# Patient Record
Sex: MALE | Race: WHITE | NOT HISPANIC OR LATINO | Employment: FULL TIME | URBAN - METROPOLITAN AREA
[De-identification: names, ages, dates, MRNs, and addresses within clinical notes are randomized per-mention and may not be internally consistent; named-entity substitution may affect disease eponyms.]

---

## 2019-04-30 ENCOUNTER — OFFICE VISIT CONVERTED (OUTPATIENT)
Dept: FAMILY MEDICINE CLINIC | Facility: CLINIC | Age: 39
End: 2019-04-30
Attending: FAMILY MEDICINE

## 2019-04-30 ENCOUNTER — HOSPITAL ENCOUNTER (OUTPATIENT)
Dept: FAMILY MEDICINE CLINIC | Facility: CLINIC | Age: 39
Discharge: HOME | End: 2019-04-30
Attending: FAMILY MEDICINE

## 2019-04-30 LAB
ALBUMIN SERPL-MCNC: 4.3 G/DL (ref 3.5–5)
ALBUMIN/GLOB SERPL: 1.6 {RATIO} (ref 1.4–2.6)
ALP SERPL-CCNC: 53 U/L (ref 53–128)
ALT SERPL-CCNC: 17 U/L (ref 10–40)
ANION GAP SERPL CALC-SCNC: 12 MMOL/L (ref 8–19)
AST SERPL-CCNC: 19 U/L (ref 15–50)
BILIRUB SERPL-MCNC: 0.46 MG/DL (ref 0.2–1.3)
BUN SERPL-MCNC: 15 MG/DL (ref 5–25)
BUN/CREAT SERPL: 15 {RATIO} (ref 6–20)
CALCIUM SERPL-MCNC: 9.4 MG/DL (ref 8.7–10.4)
CHLORIDE SERPL-SCNC: 102 MMOL/L (ref 99–111)
CHOLEST SERPL-MCNC: 154 MG/DL (ref 107–200)
CHOLEST/HDLC SERPL: 3.9 {RATIO} (ref 3–6)
CONV CO2: 31 MMOL/L (ref 22–32)
CONV TOTAL PROTEIN: 7 G/DL (ref 6.3–8.2)
CREAT UR-MCNC: 0.99 MG/DL (ref 0.7–1.2)
GFR SERPLBLD BASED ON 1.73 SQ M-ARVRAT: >60 ML/MIN/{1.73_M2}
GLOBULIN UR ELPH-MCNC: 2.7 G/DL (ref 2–3.5)
GLUCOSE SERPL-MCNC: 72 MG/DL (ref 70–99)
HDLC SERPL-MCNC: 40 MG/DL (ref 40–60)
LDLC SERPL CALC-MCNC: 87 MG/DL (ref 70–100)
OSMOLALITY SERPL CALC.SUM OF ELEC: 291 MOSM/KG (ref 273–304)
POTASSIUM SERPL-SCNC: 4.4 MMOL/L (ref 3.5–5.3)
SODIUM SERPL-SCNC: 141 MMOL/L (ref 135–147)
TRIGL SERPL-MCNC: 133 MG/DL (ref 40–150)
VLDLC SERPL-MCNC: 27 MG/DL (ref 5–37)

## 2019-05-17 ENCOUNTER — OFFICE VISIT CONVERTED (OUTPATIENT)
Dept: FAMILY MEDICINE CLINIC | Facility: CLINIC | Age: 39
End: 2019-05-17
Attending: FAMILY MEDICINE

## 2019-07-30 ENCOUNTER — OFFICE VISIT CONVERTED (OUTPATIENT)
Dept: FAMILY MEDICINE CLINIC | Facility: CLINIC | Age: 39
End: 2019-07-30
Attending: FAMILY MEDICINE

## 2019-09-05 ENCOUNTER — HOSPITAL ENCOUNTER (OUTPATIENT)
Dept: FAMILY MEDICINE CLINIC | Facility: CLINIC | Age: 39
Discharge: HOME | End: 2019-09-05
Attending: FAMILY MEDICINE

## 2019-09-05 ENCOUNTER — OFFICE VISIT CONVERTED (OUTPATIENT)
Dept: FAMILY MEDICINE CLINIC | Facility: CLINIC | Age: 39
End: 2019-09-05
Attending: FAMILY MEDICINE

## 2019-09-05 LAB
ALBUMIN SERPL-MCNC: 4 G/DL (ref 3.5–5)
ALP SERPL-CCNC: 61 U/L (ref 53–128)
ALT SERPL-CCNC: 29 U/L (ref 10–40)
AMYLASE SERPL-CCNC: 41 U/L (ref 30–110)
AST SERPL-CCNC: 26 U/L (ref 15–50)
BASOPHILS # BLD AUTO: 0.06 10*3/UL (ref 0–0.2)
BASOPHILS NFR BLD AUTO: 0.7 % (ref 0–3)
BILIRUB SERPL-MCNC: 0.26 MG/DL (ref 0.2–1.3)
CONV ABS IMM GRAN: 0.03 10*3/UL (ref 0–0.2)
CONV BILI, CONJUGATED: <0.2 MG/DL (ref 0–0.6)
CONV IMMATURE GRAN: 0.4 % (ref 0–1.8)
CONV TOTAL PROTEIN: 6.5 G/DL (ref 6.3–8.2)
CONV UNCONJUGATED BILIRUBIN: 0.1 MG/DL (ref 0–1.1)
DEPRECATED RDW RBC AUTO: 45.9 FL (ref 35.1–43.9)
EOSINOPHIL # BLD AUTO: 1.9 10*3/UL (ref 0–0.7)
EOSINOPHIL # BLD AUTO: 22.7 % (ref 0–7)
ERYTHROCYTE [DISTWIDTH] IN BLOOD BY AUTOMATED COUNT: 13 % (ref 11.6–14.4)
HCT VFR BLD AUTO: 47.1 % (ref 42–52)
HGB BLD-MCNC: 14.8 G/DL (ref 14–18)
LIPASE SERPL-CCNC: 44 U/L (ref 5–51)
LYMPHOCYTES # BLD AUTO: 1.85 10*3/UL (ref 1–5)
LYMPHOCYTES NFR BLD AUTO: 22.1 % (ref 20–45)
MCH RBC QN AUTO: 30.1 PG (ref 27–31)
MCHC RBC AUTO-ENTMCNC: 31.4 G/DL (ref 33–37)
MCV RBC AUTO: 95.7 FL (ref 80–96)
MONOCYTES # BLD AUTO: 0.64 10*3/UL (ref 0.2–1.2)
MONOCYTES NFR BLD AUTO: 7.6 % (ref 3–10)
NEUTROPHILS # BLD AUTO: 3.9 10*3/UL (ref 2–8)
NEUTROPHILS NFR BLD AUTO: 46.5 % (ref 30–85)
NRBC CBCN: 0 % (ref 0–0.7)
PLATELET # BLD AUTO: 251 10*3/UL (ref 130–400)
PMV BLD AUTO: 11.9 FL (ref 9.4–12.4)
RBC # BLD AUTO: 4.92 10*6/UL (ref 4.7–6.1)
WBC # BLD AUTO: 8.38 10*3/UL (ref 4.8–10.8)

## 2019-09-30 ENCOUNTER — OFFICE VISIT CONVERTED (OUTPATIENT)
Dept: FAMILY MEDICINE CLINIC | Facility: CLINIC | Age: 39
End: 2019-09-30
Attending: FAMILY MEDICINE

## 2019-10-11 ENCOUNTER — OFFICE VISIT CONVERTED (OUTPATIENT)
Dept: FAMILY MEDICINE CLINIC | Facility: CLINIC | Age: 39
End: 2019-10-11
Attending: FAMILY MEDICINE

## 2019-10-14 ENCOUNTER — OFFICE VISIT CONVERTED (OUTPATIENT)
Dept: FAMILY MEDICINE CLINIC | Facility: CLINIC | Age: 39
End: 2019-10-14
Attending: FAMILY MEDICINE

## 2019-11-04 ENCOUNTER — CONVERSION ENCOUNTER (OUTPATIENT)
Dept: FAMILY MEDICINE CLINIC | Facility: CLINIC | Age: 39
End: 2019-11-04

## 2019-11-04 ENCOUNTER — OFFICE VISIT CONVERTED (OUTPATIENT)
Dept: FAMILY MEDICINE CLINIC | Facility: CLINIC | Age: 39
End: 2019-11-04
Attending: FAMILY MEDICINE

## 2019-11-23 ENCOUNTER — HOSPITAL ENCOUNTER (OUTPATIENT)
Dept: URGENT CARE | Facility: CLINIC | Age: 39
Discharge: HOME | End: 2019-11-23

## 2019-11-29 ENCOUNTER — HOSPITAL ENCOUNTER (OUTPATIENT)
Dept: URGENT CARE | Facility: CLINIC | Age: 39
Discharge: HOME | End: 2019-11-29

## 2019-12-04 ENCOUNTER — HOSPITAL ENCOUNTER (OUTPATIENT)
Dept: URGENT CARE | Facility: CLINIC | Age: 39
Discharge: HOME | End: 2019-12-04

## 2020-02-10 ENCOUNTER — OFFICE VISIT CONVERTED (OUTPATIENT)
Dept: FAMILY MEDICINE CLINIC | Facility: CLINIC | Age: 40
End: 2020-02-10
Attending: FAMILY MEDICINE

## 2020-05-12 ENCOUNTER — TELEMEDICINE CONVERTED (OUTPATIENT)
Dept: FAMILY MEDICINE CLINIC | Facility: CLINIC | Age: 40
End: 2020-05-12
Attending: FAMILY MEDICINE

## 2020-08-13 ENCOUNTER — HOSPITAL ENCOUNTER (OUTPATIENT)
Dept: FAMILY MEDICINE CLINIC | Facility: CLINIC | Age: 40
Discharge: HOME | End: 2020-08-13
Attending: FAMILY MEDICINE

## 2020-08-13 ENCOUNTER — OFFICE VISIT CONVERTED (OUTPATIENT)
Dept: FAMILY MEDICINE CLINIC | Facility: CLINIC | Age: 40
End: 2020-08-13
Attending: FAMILY MEDICINE

## 2020-08-13 LAB
ALBUMIN SERPL-MCNC: 4.1 G/DL (ref 3.5–5)
ALBUMIN/GLOB SERPL: 1.3 {RATIO} (ref 1.4–2.6)
ALP SERPL-CCNC: 62 U/L (ref 53–128)
ALT SERPL-CCNC: 15 U/L (ref 10–40)
ANION GAP SERPL CALC-SCNC: 17 MMOL/L (ref 8–19)
AST SERPL-CCNC: 18 U/L (ref 15–50)
BILIRUB SERPL-MCNC: 0.46 MG/DL (ref 0.2–1.3)
BUN SERPL-MCNC: 14 MG/DL (ref 5–25)
BUN/CREAT SERPL: 12 {RATIO} (ref 6–20)
CALCIUM SERPL-MCNC: 10.1 MG/DL (ref 8.7–10.4)
CHLORIDE SERPL-SCNC: 103 MMOL/L (ref 99–111)
CHOLEST SERPL-MCNC: 158 MG/DL (ref 107–200)
CHOLEST/HDLC SERPL: 4.1 {RATIO} (ref 3–6)
CONV CO2: 27 MMOL/L (ref 22–32)
CONV TOTAL PROTEIN: 7.2 G/DL (ref 6.3–8.2)
CREAT UR-MCNC: 1.15 MG/DL (ref 0.7–1.2)
GFR SERPLBLD BASED ON 1.73 SQ M-ARVRAT: >60 ML/MIN/{1.73_M2}
GLOBULIN UR ELPH-MCNC: 3.1 G/DL (ref 2–3.5)
GLUCOSE SERPL-MCNC: 82 MG/DL (ref 70–99)
HDLC SERPL-MCNC: 39 MG/DL (ref 40–60)
LDLC SERPL CALC-MCNC: 92 MG/DL (ref 70–100)
OSMOLALITY SERPL CALC.SUM OF ELEC: 294 MOSM/KG (ref 273–304)
POTASSIUM SERPL-SCNC: 5.1 MMOL/L (ref 3.5–5.3)
SODIUM SERPL-SCNC: 142 MMOL/L (ref 135–147)
TRIGL SERPL-MCNC: 135 MG/DL (ref 40–150)
VLDLC SERPL-MCNC: 27 MG/DL (ref 5–37)

## 2020-09-10 ENCOUNTER — OFFICE VISIT CONVERTED (OUTPATIENT)
Dept: FAMILY MEDICINE CLINIC | Facility: CLINIC | Age: 40
End: 2020-09-10
Attending: FAMILY MEDICINE

## 2020-11-13 ENCOUNTER — OFFICE VISIT CONVERTED (OUTPATIENT)
Dept: FAMILY MEDICINE CLINIC | Facility: CLINIC | Age: 40
End: 2020-11-13
Attending: FAMILY MEDICINE

## 2021-02-16 ENCOUNTER — TELEMEDICINE CONVERTED (OUTPATIENT)
Dept: FAMILY MEDICINE CLINIC | Facility: CLINIC | Age: 41
End: 2021-02-16
Attending: FAMILY MEDICINE

## 2021-03-18 ENCOUNTER — OFFICE VISIT CONVERTED (OUTPATIENT)
Dept: FAMILY MEDICINE CLINIC | Facility: CLINIC | Age: 41
End: 2021-03-18
Attending: FAMILY MEDICINE

## 2021-05-13 NOTE — PROGRESS NOTES
Progress Note      Patient Name: Shiva Varela   Patient ID: [id number]   Sex: Male   YOB: 1980    Primary Care Provider: Ed Myers DO    Visit Date: August 13, 2020    Provider: Ed Myers DO   Location: [facility]   Location Address: [address] [place] [address]  [id number]   Location Phone: [phone]          Chief Complaint  · 3 month follow up - HTN, ADD      History Of Present Illness  Shiva Varela is a 39 year old /White male who presents for evaluation and treatment of: HTN and ADD. He states that he has NOT abiola checking his BP outside the office. His focus and concentration is fair.       Past Medical History  Disease Name Date Onset Notes   ADD (attention deficit disorder) --  --    Essential hypertension 04/30/2019 --    Hernia --  --    Recurrent major depressive disorder, in full remission 04/30/2019 --          Past Surgical History  Procedure Name Date Notes   ACL Reconstruction 2000 left knee   Appendectomy --  --    Hand surgery --  --    Hernia --  --    Meniscectomy, knee, lateral 2015 --    [place] Tooth Extraction --  --          Medication List  Name Date Started Instructions   Adderall XR 20 mg oral capsule,extended release 24hr 08/11/2020 take 1 capsule (20 mg) by oral route once daily in the morning upon awakening for 30 days   fluticasone propionate 50 mcg/actuation nasal spray,suspension 03/06/2020 SPRAY 1 SPRAY INTO EACH NOSTRIL EVERY DAY   metoprolol succinate 50 mg oral tablet extended release 24 hr 12/13/2019 take 1 tablet (50 mg) by oral route once daily for 90 days   Zoloft 100 mg oral tablet 12/13/2019 take 1 tablet (100 mg) by oral route once daily for 90 days         Allergy List  Allergen Name Date Reaction Notes   Codiene --  --  --          Family Medical History  Disease Name Relative/Age Notes   Heart Disease Grandfather (paternal)/  Grandmother (paternal)/   --    Cancer, Unspecified Grandfather  "(paternal)/   --    Diabetes Uncle/   --          Social History  Finding Status Start/Stop Quantity Notes   Alcohol Never --/-- --  --    Tobacco Never --/-- --  --          Immunizations  NameDate Admin Mfg Trade Name Lot Number Route Inj VIS Given VIS Publication   [id number]/04/2019 [facility] Fluzone Quadrivalent [id number] IM RD 11/04/2019    Comments: pt tolerated well with no complaints         Review of Systems  · Constitutional  o Denies  o : fatigue, weight loss, loss of appetite  · HENT  o Denies  o : headaches  · Cardiovascular  o Denies  o : chest pain, irregular heart beats, rapid heart rate  · Respiratory  o Denies  o : shortness of breath, wheezing, cough, dyspnea on exertion  · Neurologic  o Denies  o : difficulty concentrating  · Psychiatric  o Denies  o : inattentiveness      Vitals  Date Time BP Position Site L\R Cuff Size HR RR TEMP (F) WT  HT  BMI kg/m2 BSA m2 O2 Sat HC       11/04/2019 08:03 /62 Sitting    72 - R   259lbs 0oz 6'  5\" 30.71 2.53 98 %    02/10/2020 03:43 /74 Sitting    69 - R   264lbs 4oz 6'  5\" 31.34 2.55 100 %    08/13/2020 10:04 /79 Sitting    87 - R  97.2 265lbs 0oz 6'  5\" 31.42 2.56 99 %          Physical Examination  · Constitutional  o Appearance  o : well-nourished, well developed, no obvious deformities present  · Respiratory  o Respiratory Effort  o : breathing unlabored, no accessory muscle use  o Auscultation of Lungs  o : normal breath sounds  · Cardiovascular  o Heart  o :   § Auscultation of Heart  § : regular rate, normal rhythm, no murmurs present          Results  · In-Office Procedures  o Lab procedure  § IOP - Urine Drug Screen In-House [facility] (58579)   § Amphetamines Ur Ql: Positive   § Barbiturates Ur Ql: Negative   § Buprenorphine+Nor Ur Ql Scn: Negative   § Benzodiaz Ur Ql: Negative   § Cocaine Ur Ql: Negative   § Methadone Ur Ql: Negative   § Methamphet Ur Ql: Negative   § MDMA Ur Ql Scn: Negative   § Opiates Ur Ql: Negative   § Oxycodone Ur Ql: " Negative   § PCP Ur Ql: Negative   § THC Ur Ql: Negative   § Temp in Range?: Within/Acceptable   § Control Seen?: Yes       Assessment  · Essential hypertension     401.9/I10  good control  · ADD (attention deficit disorder)     314.00/F98.8  stable      Plan  · Orders  o CMP [facility] (45596) - 401.9/I10 - 08/13/2020  o Lipid Panel [facility] (03544) - 401.9/I10 - 08/13/2020  o ACO-39: Current medications updated and reviewed () - - 08/13/2020  · Medications  o Medications have been Reconciled  o Transition of Care or Provider Policy  · Instructions  o Patient advised to monitor blood pressure (B/P) at home and journal readings. Patient informed that a B/P reading at home of more than 130/80 is considered hypertension. For readings greater [id number]/90 or higher patient is advised to follow up in the office with readings for management. Patient advised to limit sodium intake.  o Patient is taking medications as prescribed and doing well.   o Take all medications as prescribed/directed.  o Patient instructed/educated on their diet and exercise program.  o Patient was educated/instructed on their diagnosis, treatment and medications prior to discharge from the clinic today.  o Patient instructed to seek medical attention urgently for new or worsening symptoms.  o Call the office with any concerns or questions.  o Bring all medicines with their bottles to each office visit.  · Disposition  o Call or Return if symptoms worsen or persist.  o Return Visit Request in/on 3 months +/- 2 days ([id number]).            Electronically Signed by: Haley Chanel MA -Author on August 13, 2020 10:52:42 AM  Electronically Co-signed by: Ed Myers DO -Reviewer on August 13, 2020 01:15:25 PM

## 2021-05-13 NOTE — PROGRESS NOTES
Progress Note      Patient Name: Shiva Varela   Patient ID: [id number]   Sex: Male   YOB: 1980    Primary Care Provider: Ed Myers DO    Visit Date: November 13, 2020    Provider: Ed Myers DO   Location: [facility]   Location Address: [address] [place] [address]  [id number]   Location Phone: [phone]          Chief Complaint  · Follow up - HTN, ADD      History Of Present Illness  Shiva Varela is a 40 year old /White male who presents for evaluation and treatment of: HTN and ADD. He states that he has not been checking his BP. He istaking his med daily.      ADD- he states that he is doing well with his current med. His focus, attention and concentration remain good.       Past Medical History  Disease Name Date Onset Notes   ADD (attention deficit disorder) --  --    Essential hypertension 04/30/2019 --    Hernia --  --    Recurrent major depressive disorder, in full remission 04/30/2019 --    Ulnar neuropathy at elbow of left upper extremity 09/10/2020 He will work on relieving pressure off the nerve/medial elbow. IF it persist then EMG         Past Surgical History  Procedure Name Date Notes   ACL Reconstruction 2000 left knee   Appendectomy --  --    Hand surgery --  --    Hernia --  --    Meniscectomy, knee, lateral 2015 --    [place] Tooth Extraction --  --          Medication List  Name Date Started Instructions   Adderall XR 20 mg oral capsule,extended release 24hr 11/06/2020 take 1 capsule (20 mg) by oral route once daily in the morning upon awakening for 30 days   fluticasone propionate 50 mcg/actuation nasal spray,suspension 09/10/2020 SPRAY 1 SPRAY INTO EACH NOSTRIL EVERY DAY   metoprolol succinate 50 mg oral tablet extended release 24 hr 12/13/2019 take 1 tablet (50 mg) by oral route once daily for 90 days   Zoloft 100 mg oral tablet 12/13/2019 take 1 tablet (100 mg) by oral route once daily for 90 days  "        Allergy List  Allergen Name Date Reaction Notes   Codiene --  --  --        Allergies Reconciled  Family Medical History  Disease Name Relative/Age Notes   Heart Disease Grandfather (paternal)/  Grandmother (paternal)/   --    Cancer, Unspecified Grandfather (paternal)/   --    Diabetes Uncle/   --          Social History  Finding Status Start/Stop Quantity Notes   Alcohol Never --/-- --  --    Tobacco Never --/-- --  --          Immunizations  NameDate Admin Mfg Trade Name Lot Number Route Inj VIS Given VIS Publication   [id number]/01/2020 [employer] Fluarix, quadrivalent, preservative free [id number] NE NE 10/01/2020    Comments: [facility] in [place]         Review of Systems  · Constitutional  o Denies  o : fatigue  · HENT  o Denies  o : headaches  · Cardiovascular  o Denies  o : chest pain, irregular heart beats, rapid heart rate, lightheadedness  · Respiratory  o Denies  o : shortness of breath, wheezing, cough, dyspnea on exertion  · Neurologic  o Denies  o : difficulty concentrating  · Psychiatric  o Admits  o : inattentiveness      Vitals  Date Time BP Position Site L\R Cuff Size HR RR TEMP (F) WT  HT  BMI kg/m2 BSA m2 O2 Sat FR L/min FiO2 HC       08/13/2020 10:04 /79 Sitting    87 - R  97.2 265lbs 0oz 6'  5\" 31.42 2.56 99 %  21%    09/10/2020 09:34 /78 Sitting    89 - R  97.8 266lbs 0oz 6'  5\" 31.54 2.56 100 %  21%    11/13/2020 10:52 /79 Sitting    79 - R  97.5 265lbs 2oz 6'  5\" 31.44 2.56 100 %  21%          Physical Examination  · Constitutional  o Appearance  o : well-nourished, well developed, no obvious deformities present  · Respiratory  o Respiratory Effort  o : breathing unlabored, no accessory muscle use  o Auscultation of Lungs  o : normal breath sounds  · Cardiovascular  o Heart  o :   § Auscultation of Heart  § : regular rate, normal rhythm, no murmurs present          Assessment  · Essential hypertension     401.9/I10  good control  · ADD (attention deficit " disorder)     314.00/F98.8  stable with his current med      Plan  · Orders  o ACO-39: Current medications updated and reviewed (1159F, ) - - 11/13/2020  o ACO-14: Influenza immunization administered or previously received [facility] () - - 11/13/2020  · Medications  o metoprolol succinate 50 mg oral tablet extended release 24 hr   SIG: take 1 tablet (50 mg) by oral route once daily for 90 days   DISP: (90) Tablet with 3 refills  Adjusted on 11/13/2020     o Zoloft 100 mg oral tablet   SIG: take 1 tablet (100 mg) by oral route once daily for 90 days   DISP: (90) Tablet with 3 refills  Adjusted on 11/13/2020     o Medications have been Reconciled  o Transition of Care or Provider Policy  · Instructions  o Patient advised to monitor blood pressure (B/P) at home and journal readings. Patient informed that a B/P reading at home of more than 130/80 is considered hypertension. For readings greater jnjn452/90 or higher patient is advised to follow up in the office with readings for management. Patient advised to limit sodium intake.  o Patient is taking medications as prescribed and doing well.   o Take all medications as prescribed/directed.  o Patient instructed/educated on their diet and exercise program.  o Patient was educated/instructed on their diagnosis, treatment and medications prior to discharge from the clinic today.  o Patient instructed to seek medical attention urgently for new or worsening symptoms.  o Call the office with any concerns or questions.  o Bring all medicines with their bottles to each office visit.  · Disposition  o Call or Return if symptoms worsen or persist.  o Return Visit Request in/on 3 months +/- 2 days ([id number]).            Electronically Signed by: Ed Myers, DO -Author on November 13, 2020 11:27:45 AM

## 2021-05-13 NOTE — PROGRESS NOTES
Progress Note      Patient Name: Shiva Varela   Patient ID: [id number]   Sex: Male   YOB: 1980    Primary Care Provider: Ed Myers DO    Visit Date: September 10, 2020    Provider: Ed Myers DO   Location: [facility]   Location Address: [address] [place] [address]  [id number]   Location Phone: [phone]          Chief Complaint  · Pt c/o possible carpal tunnel in left hand/wrist       History Of Present Illness  Shiva Varela is a 39 year old /White male who presents for evaluation and treatment of: tingling hand. He states that he has tingling and numbness left hand. It is predominantly ulnar aspect and up to his elbow. It is worse with typing on the keyboard.       Past Medical History  Disease Name Date Onset Notes   ADD (attention deficit disorder) --  --    Essential hypertension 04/30/2019 --    Hernia --  --    Recurrent major depressive disorder, in full remission 04/30/2019 --    Ulnar neuropathy at elbow of left upper extremity 09/10/2020 He will work on relieving pressure off the nerve/medial elbow. IF it persist then EMG         Past Surgical History  Procedure Name Date Notes   ACL Reconstruction 2000 left knee   Appendectomy --  --    Hand surgery --  --    Hernia --  --    Meniscectomy, knee, lateral 2015 --    [place] Tooth Extraction --  --          Medication List  Name Date Started Instructions   Adderall XR 20 mg oral capsule,extended release 24hr 09/08/2020 take 1 capsule (20 mg) by oral route once daily in the morning upon awakening for 30 days   fluticasone propionate 50 mcg/actuation nasal spray,suspension 03/06/2020 SPRAY 1 SPRAY INTO EACH NOSTRIL EVERY DAY   metoprolol succinate 50 mg oral tablet extended release 24 hr 12/13/2019 take 1 tablet (50 mg) by oral route once daily for 90 days   Zoloft 100 mg oral tablet 12/13/2019 take 1 tablet (100 mg) by oral route once daily for 90 days         Allergy  "List  Allergen Name Date Reaction Notes   Codiene --  --  --          Family Medical History  Disease Name Relative/Age Notes   Heart Disease Grandfather (paternal)/  Grandmother (paternal)/   --    Cancer, Unspecified Grandfather (paternal)/   --    Diabetes Uncle/   --          Social History  Finding Status Start/Stop Quantity Notes   Alcohol Never --/-- --  --    Tobacco Never --/-- --  --          Immunizations  NameDate Admin Mfg Trade Name Lot Number Route Inj VIS Given VIS Publication   [id number]/04/2019 [facility] Fluzone Quadrivalent [id number] IM RD 11/04/2019    Comments: pt tolerated well with no complaints         Review of Systems  · Constitutional  o Denies  o : fatigue  · Neurologic  o Admits  o : tingling or numbness  o Denies  o : muscular weakness      Vitals  Date Time BP Position Site L\R Cuff Size HR RR TEMP (F) WT  HT  BMI kg/m2 BSA m2 O2 Sat HC       02/10/2020 03:43 /74 Sitting    69 - R   264lbs 4oz 6'  5\" 31.34 2.55 100 %    08/13/2020 10:04 /79 Sitting    87 - R  97.2 265lbs 0oz 6'  5\" 31.42 2.56 99 %    09/10/2020 09:34 /78 Sitting    89 - R  97.8 266lbs 0oz 6'  5\" 31.54 2.56 100 %          Physical Examination  · Constitutional  o Appearance  o : well-nourished, well developed, no obvious deformities present  · Neurologic  o Motor Examination  o :   § RUE Strength  § : strength normal  § LUE Strength  § : strength normal  o Sensation  o :   § Light Touch  § : slight decreased sensation left 5th medial digit  · Left Elbow  o Inspection  o : no deformity  o Range of Motion  o : flexion normal-150 degrees, extension normal-0 degrees, pronation normal-70 degrees, supination normal-90 degrees  o Special Tests  o : (+) tinnels ulnar nerve          Assessment  · Ulnar neuropathy at elbow of left upper extremity     354.2/G56.22  He will work on relieving pressure off the nerve/medial elbow. IF it persist then EMG      Plan  · Orders  o ACO-39: Current medications updated and " reviewed () - - 09/10/2020  · Medications  o Medications have been Reconciled  o Transition of Care or Provider Policy  · Instructions  o Patient is taking medications as prescribed and doing well.   o Take all medications as prescribed/directed.  o Patient instructed/educated on their diet and exercise program.  o Patient was educated/instructed on their diagnosis, treatment and medications prior to discharge from the clinic today.  o Patient instructed to seek medical attention urgently for new or worsening symptoms.  o Call the office with any concerns or questions.  o Bring all medicines with their bottles to each office visit.  · Disposition  o Call or Return if symptoms worsen or persist.  o Return Visit Request in/on 3 months +/- 2 days ([id number]).            Electronically Signed by: Ed Myers DO -Author on September 10, 2020 09:55:07 AM

## 2021-05-13 NOTE — PROGRESS NOTES
Progress Note      Patient Name: Shiva Varela   Patient ID: [id number]   Sex: Male   YOB: 1980    Primary Care Provider: Ed Myers DO    Visit Date: May 12, 2020    Provider: Ed Myers DO   Location: [facility]   Location Address: [address] [place] [address]  [id number]   Location Phone: [phone]          Chief Complaint  · 3 month follow up - HTN, ADD, ETD(left)  · medication refills      History Of Present Illness  Video Conferencing Visit  Shiva Varela is a 39 year old /White male who is presenting for evaluation via video conferencing. Verbal consent obtained before beginning visit.   The following staff were present during this visit: Haley Chanel CMA   Shiva Varela is a 39 year old /White male who presents for evaluation and treatment of: ADD and HTN. He states that he has been checking his BP and it was 123/80. He states that his ADD is controlled. He is taking his medication daily.       Past Medical History  Disease Name Date Onset Notes   ADD (attention deficit disorder) --  --    Essential hypertension 04/30/2019 --    Hernia --  --    Recurrent major depressive disorder, in full remission 04/30/2019 --          Past Surgical History  Procedure Name Date Notes   ACL Reconstruction 2000 left knee   Appendectomy --  --    Hand surgery --  --    Hernia --  --    Meniscectomy, knee, lateral 2015 --    [place] Tooth Extraction --  --          Medication List  Name Date Started Instructions   Adderall XR 20 mg oral capsule,extended release 24hr 04/09/2020 take 1 capsule (20 mg) by oral route once daily in the morning upon awakening for 30 days   fluticasone propionate 50 mcg/actuation nasal spray,suspension 03/06/2020 SPRAY 1 SPRAY INTO EACH NOSTRIL EVERY DAY   metoprolol succinate 50 mg oral tablet extended release 24 hr 12/13/2019 take 1 tablet (50 mg) by oral route once daily for 90 days   Zoloft 100 mg oral tablet  12/13/2019 take 1 tablet (100 mg) by oral route once daily for 90 days         Allergy List  Allergen Name Date Reaction Notes   Codiene --  --  --          Family Medical History  Disease Name Relative/Age Notes   Heart Disease Grandfather (paternal)/  Grandmother (paternal)/   --    Cancer, Unspecified Grandfather (paternal)/   --    Diabetes Uncle/   --          Social History  Finding Status Start/Stop Quantity Notes   Alcohol Never --/-- --  --    Tobacco Never --/-- --  --          Immunizations  NameDate Admin Mfg Trade Name Lot Number Route Inj VIS Given VIS Publication   [id number]/04/2019 [facility] Fluzone Quadrivalent [id number] IM RD 11/04/2019    Comments: pt tolerated well with no complaints         Review of Systems  · Constitutional  o Denies  o : fatigue  · HENT  o Denies  o : headaches  · Cardiovascular  o Denies  o : chest pain, irregular heart beats, rapid heart rate  · Respiratory  o Denies  o : shortness of breath, wheezing, cough  · Neurologic  o Denies  o : difficulty concentrating  · Psychiatric  o Denies  o : inattentiveness      Physical Examination  · Constitutional  o Appearance  o : well-nourished, well developed, no obvious deformities present  · Head and Face  o Head  o :   § Inspection  § : atraumatic, normocephalic  o Face  o :   § Inspection  § : no facial lesions  · Respiratory  o Respiratory Effort  o : breathing unlabored          Assessment  · Essential hypertension     401.9/I10  His BP is good. Will update his labs  · ADD (attention deficit disorder)     314.00/F98.8  He is doing well. Will update his UDS      Plan  · Orders  o CMP [facility] ([id number]) - 401.9/I10 - 05/12/2020  o Lipid Panel [facility] (99576) - 401.9/I10 - 05/12/2020  o ACO-39: Current medications updated and reviewed () - - 05/12/2020  · Medications  o Adderall XR 20 mg oral capsule,extended release 24hr   SIG: take 1 capsule (20 mg) by oral route once daily in the morning upon awakening for 30 days   DISP: (30) capsule with  0 refills  Adjusted on 05/12/2020     o Medications have been Reconciled  o Transition of Care or Provider Policy  · Instructions  o Patient advised to monitor blood pressure (B/P) at home and journal readings. Patient informed that a B/P reading at home of more than 130/80 is considered hypertension. For readings greater [id number]/90 or higher patient is advised to follow up in the office with readings for management. Patient advised to limit sodium intake.  o Patient is taking medications as prescribed and doing well.   o Take all medications as prescribed/directed.  o Patient instructed/educated on their diet and exercise program.  o Patient was educated/instructed on their diagnosis, treatment and medications prior to discharge from the clinic today.  o Patient instructed to seek medical attention urgently for new or worsening symptoms.  o Bring all medicines with their bottles to each office visit.  · Disposition  o Call or Return if symptoms worsen or persist.  o Return Visit Request in/on 3 months +/- 2 days ([id number]).            Electronically Signed by: Ed Myers, DO -Author on May 12, 2020 02:15:46 PM

## 2021-05-14 VITALS
HEIGHT: 77 IN | BODY MASS INDEX: 31.55 KG/M2 | TEMPERATURE: 97.6 F | WEIGHT: 267.25 LBS | OXYGEN SATURATION: 100 % | SYSTOLIC BLOOD PRESSURE: 118 MMHG | HEART RATE: 80 BPM | DIASTOLIC BLOOD PRESSURE: 83 MMHG

## 2021-05-14 VITALS
HEART RATE: 89 BPM | TEMPERATURE: 97.8 F | BODY MASS INDEX: 31.41 KG/M2 | WEIGHT: 266 LBS | SYSTOLIC BLOOD PRESSURE: 121 MMHG | DIASTOLIC BLOOD PRESSURE: 78 MMHG | OXYGEN SATURATION: 100 % | HEIGHT: 77 IN

## 2021-05-14 VITALS
OXYGEN SATURATION: 100 % | HEIGHT: 77 IN | SYSTOLIC BLOOD PRESSURE: 123 MMHG | BODY MASS INDEX: 31.3 KG/M2 | TEMPERATURE: 97.5 F | DIASTOLIC BLOOD PRESSURE: 79 MMHG | WEIGHT: 265.12 LBS | HEART RATE: 79 BPM

## 2021-05-14 NOTE — PROGRESS NOTES
Progress Note      Patient Name: Shiva Varela   Patient ID: [id number]   Sex: Male   YOB: 1980    Primary Care Provider: Ed Myers DO    Visit Date: March 18, 2021    Provider: Ed Myers DO   Location: [facility]   Location Address: [address] [place] [address]  [id number]   Location Phone: [phone]          Chief Complaint  · pt c/o possible plantar wart on right foot       History Of Present Illness  Shiva Varela is a 40 year old /White male who presents for evaluation and treatment of: wart. He states that he has a wart on the bottom of his right foot. It is itchy, but not painful.       Past Medical History  Disease Name Date Onset Notes   ADD (attention deficit disorder) --  --    Essential hypertension 04/30/2019 --    Hernia --  --    Recurrent major depressive disorder, in full remission 04/30/2019 --    Ulnar neuropathy at elbow of left upper extremity 09/10/2020 He will work on relieving pressure off the nerve/medial elbow. IF it persist then EMG         Past Surgical History  Procedure Name Date Notes   ACL Reconstruction 2000 left knee   Appendectomy --  --    Hand surgery --  --    Hernia --  --    Meniscectomy, knee, lateral 2015 --    [place] Tooth Extraction --  --          Medication List  Name Date Started Instructions   Adderall XR 20 mg oral capsule,extended release 24hr 03/08/2021 take 1 capsule (20 mg) by oral route once daily in the morning upon awakening for 30 days   fluticasone propionate 50 mcg/actuation nasal spray,suspension 09/10/2020 SPRAY 1 SPRAY INTO EACH NOSTRIL EVERY DAY   metoprolol succinate 50 mg oral tablet extended release 24 hr 11/13/2020 take 1 tablet (50 mg) by oral route once daily for 90 days   Zoloft 100 mg oral tablet 11/13/2020 take 1 tablet (100 mg) by oral route once daily for 90 days         Allergy List  Allergen Name Date Reaction Notes   Codiene --  --  --          Family  "Medical History  Disease Name Relative/Age Notes   Heart Disease Grandfather (paternal)/  Grandmother (paternal)/   --    Cancer, Unspecified Grandfather (paternal)/   --    Diabetes Uncle/   --          Social History  Finding Status Start/Stop Quantity Notes   Alcohol Never --/-- --  --    Tobacco Never --/-- --  --          Immunizations  NameDate Admin Mfg Trade Name Lot Number Route Inj VIS Given VIS Publication   Qjsmrzswc74/01/2020 SKB Fluarix, quadrivalent, preservative free [id number] NE NE 10/01/2020    Comments: [facility] in [place]         Review of Systems  · Constitutional  o Denies  o : fatigue  · Integument  o Admits  o : new skin lesions      Vitals  Date Time BP Position Site L\R Cuff Size HR RR TEMP (F) WT  HT  BMI kg/m2 BSA m2 O2 Sat FR L/min FiO2 HC       09/10/2020 09:34 /78 Sitting    89 - R  97.8 266lbs 0oz 6'  5\" 31.54 2.56 100 %  21%    11/13/2020 10:52 /79 Sitting    79 - R  97.5 265lbs 2oz 6'  5\" 31.44 2.56 100 %  21%    03/18/2021 09:23 /83 Sitting    80 - R  97.6 267lbs 4oz 6'  5\" 31.69 2.57 100 %  21%          Physical Examination  · Constitutional  o Appearance  o : well-nourished, well developed, no obvious deformities present  · Skin and Subcutaneous Tissue  o Extremities  o :   § Right Lower Extremity  § : macular area of scaly skin          Assessment  · Screening for depression     V79.0/Z13.89  · Eczema     692.9/L30.9  this may be fungal also. He needs to keep moist and steroid cream +/- anti-fungal      Plan  · Orders  o ACO-18: Negative screen for clinical depression using a standardized tool () - V79.0/Z13.89 - 03/18/2021  o ACO-39: Current medications updated and reviewed (, 1159F) - - 03/18/2021  · Medications  o Medications have been Reconciled  o Transition of Care or Provider Policy  · Instructions  o Depression Screen completed and scanned into the EMR under the designated folder within the patient's documents.  o Today's PHQ-9 result is " 3  o Patient is taking medications as prescribed and doing well.   o Take all medications as prescribed/directed.  o Patient instructed/educated on their diet and exercise program.  o Patient was educated/instructed on their diagnosis, treatment and medications prior to discharge from the clinic today.  o Patient instructed to seek medical attention urgently for new or worsening symptoms.  o Call the office with any concerns or questions.  o Bring all medicines with their bottles to each office visit.  · Disposition  o Call or Return if symptoms worsen or persist.  o needs routine f/u appointment for chronic health issues            Electronically Signed by: Ed Myers, DO -Author on March 18, 2021 09:55:47 AM

## 2021-05-14 NOTE — PROGRESS NOTES
Progress Note      Patient Name: Shiva Varela   Patient ID: [id number]   Sex: Male   YOB: 1980    Primary Care Provider: Ed Myers DO    Visit Date: February 16, 2021    Provider: Ed Myers DO   Location: [facility]   Location Address: [address] [place] [address]  [id number]   Location Phone: [phone]          Chief Complaint  · 3 month follow up - HTN, ADD  · medicaiton refills      History Of Present Illness  Video Conferencing Visit  Shiva Varela is a 40 year old /White male who is presenting for evaluation via video conferencing via ZOOM. Verbal consent obtained before beginning visit.   The following staff were present during this visit: Haley Chanel CMA   Shiva Varela is a 40 year old /White male who presents for evaluation and treatment of: ADD. He states that he is doing well with his current med and dose. No insomnia or loss of appetite.      HTN- He does NOT check his BP outside the office. He takes his med daily       Past Medical History  Disease Name Date Onset Notes   ADD (attention deficit disorder) --  --    Essential hypertension 04/30/2019 --    Hernia --  --    Recurrent major depressive disorder, in full remission 04/30/2019 --    Ulnar neuropathy at elbow of left upper extremity 09/10/2020 He will work on relieving pressure off the nerve/medial elbow. IF it persist then EMG         Past Surgical History  Procedure Name Date Notes   ACL Reconstruction 2000 left knee   Appendectomy --  --    Hand surgery --  --    Hernia --  --    Meniscectomy, knee, lateral 2015 --    [place] Tooth Extraction --  --          Medication List  Name Date Started Instructions   Adderall XR 20 mg oral capsule,extended release 24hr 02/05/2021 take 1 capsule (20 mg) by oral route once daily in the morning upon awakening for 30 days   fluticasone propionate 50 mcg/actuation nasal spray,suspension 09/10/2020 SPRAY 1  "SPRAY INTO EACH NOSTRIL EVERY DAY   metoprolol succinate 50 mg oral tablet extended release 24 hr 11/13/2020 take 1 tablet (50 mg) by oral route once daily for 90 days   Zoloft 100 mg oral tablet 11/13/2020 take 1 tablet (100 mg) by oral route once daily for 90 days         Allergy List  Allergen Name Date Reaction Notes   Codiene --  --  --          Family Medical History  Disease Name Relative/Age Notes   Heart Disease Grandfather (paternal)/  Grandmother (paternal)/   --    Cancer, Unspecified Grandfather (paternal)/   --    Diabetes Uncle/   --          Social History  Finding Status Start/Stop Quantity Notes   Alcohol Never --/-- --  --    Tobacco Never --/-- --  --          Immunizations  NameDate Admin Mfg Trade Name Lot Number Route Inj VIS Given VIS Publication   [id number]/01/2020 SKB Fluarix, quadrivalent, preservative free [id number] NE NE 10/01/2020    Comments: [facility] in [place]         Review of Systems  · Constitutional  o Denies  o : fatigue  · HENT  o Denies  o : headaches  · Cardiovascular  o Denies  o : chest pain, irregular heart beats, rapid heart rate  · Respiratory  o Denies  o : shortness of breath, wheezing, cough, dyspnea on exertion  · Neurologic  o Denies  o : difficulty concentrating  · Psychiatric  o Denies  o : difficulty sleeping, inattentiveness      Vitals  Date Time BP Position Site L\R Cuff Size HR RR TEMP (F) WT  HT  BMI kg/m2 BSA m2 O2 Sat FR L/min FiO2 HC       08/13/2020 10:04 /79 Sitting    87 - R  97.2 265lbs 0oz 6'  5\" 31.42 2.56 99 %  21%    09/10/2020 09:34 /78 Sitting    89 - R  97.8 266lbs 0oz 6'  5\" 31.54 2.56 100 %  21%    11/13/2020 10:52 /79 Sitting    79 - R  97.5 265lbs 2oz 6'  5\" 31.44 2.56 100 %  21%          Physical Examination  · Constitutional  o Appearance  o : well-nourished, well developed, no obvious deformities present  · Head and Face  o Head  o :   § Inspection  § : atraumatic, normocephalic  o Face  o :   § Inspection  § : no " facial lesions  · Respiratory  o Respiratory Effort  o : breathing unlabored, no accessory muscle use  · Neurologic  o Mental Status Examination  o :   § Orientation  § : grossly oriented to person, place and time  § Speech/Language  § : speech development normal for age, level of language comprehension normal for age, communication ability within normal limits, voice quality normal, rate or speech normal  § Attention  § : attention normal, concentration abilities normal  · Psychiatric  o Judgement and Insight  o : judgement and insight intact  o Thought Processes  o : rate of thoughts normal  o Mood and Affect  o : mood normal, affect appropriate          Assessment  · Essential hypertension     401.9/I10  Will update his labs next visit  · ADD (attention deficit disorder)     314.00/F98.8  He is doing well on his current med and dosage       Plan  · Orders  o ACO-39: Current medications updated and reviewed (, 1159F) - - 02/16/2021  · Medications  o Medications have been Reconciled  o Transition of Care or Provider Policy  · Instructions  o Patient advised to monitor blood pressure (B/P) at home and journal readings. Patient informed that a B/P reading at home of more than 130/80 is considered hypertension. For readings greater jkqw192/90 or higher patient is advised to follow up in the office with readings for management. Patient advised to limit sodium intake.  o Patient is taking medications as prescribed and doing well.   o Take all medications as prescribed/directed.  o Patient instructed/educated on their diet and exercise program.  o Patient was educated/instructed on their diagnosis, treatment and medications prior to discharge from the clinic today.  o Patient instructed to seek medical attention urgently for new or worsening symptoms.  o Call the office with any concerns or questions.  o Bring all medicines with their bottles to each office visit.  · Disposition  o Call or Return if symptoms worsen  or persist.  o Return Visit Request in/on 3 months +/- 2 days ([id number]).            Electronically Signed by: Ed Myers DO -Author on February 16, 2021 10:37:54 AM

## 2021-05-15 VITALS
OXYGEN SATURATION: 100 % | HEIGHT: 77 IN | TEMPERATURE: 97.3 F | SYSTOLIC BLOOD PRESSURE: 133 MMHG | HEART RATE: 67 BPM | WEIGHT: 256.19 LBS | DIASTOLIC BLOOD PRESSURE: 77 MMHG | BODY MASS INDEX: 30.25 KG/M2

## 2021-05-15 VITALS
SYSTOLIC BLOOD PRESSURE: 124 MMHG | OXYGEN SATURATION: 98 % | HEART RATE: 63 BPM | DIASTOLIC BLOOD PRESSURE: 72 MMHG | HEIGHT: 77 IN | BODY MASS INDEX: 30.34 KG/M2 | TEMPERATURE: 97.9 F | WEIGHT: 257 LBS

## 2021-05-15 VITALS
BODY MASS INDEX: 31.2 KG/M2 | OXYGEN SATURATION: 100 % | WEIGHT: 264.25 LBS | DIASTOLIC BLOOD PRESSURE: 74 MMHG | HEART RATE: 69 BPM | HEIGHT: 77 IN | SYSTOLIC BLOOD PRESSURE: 128 MMHG

## 2021-05-15 VITALS
BODY MASS INDEX: 29.99 KG/M2 | HEART RATE: 67 BPM | SYSTOLIC BLOOD PRESSURE: 110 MMHG | DIASTOLIC BLOOD PRESSURE: 73 MMHG | WEIGHT: 254 LBS | TEMPERATURE: 97.7 F | HEIGHT: 77 IN

## 2021-05-15 VITALS
WEIGHT: 255 LBS | BODY MASS INDEX: 30.11 KG/M2 | HEIGHT: 77 IN | HEART RATE: 78 BPM | SYSTOLIC BLOOD PRESSURE: 140 MMHG | DIASTOLIC BLOOD PRESSURE: 78 MMHG

## 2021-05-15 VITALS
WEIGHT: 253 LBS | HEART RATE: 71 BPM | HEIGHT: 77 IN | DIASTOLIC BLOOD PRESSURE: 74 MMHG | BODY MASS INDEX: 29.87 KG/M2 | SYSTOLIC BLOOD PRESSURE: 112 MMHG

## 2021-05-15 VITALS
WEIGHT: 257 LBS | TEMPERATURE: 97.1 F | SYSTOLIC BLOOD PRESSURE: 128 MMHG | HEART RATE: 74 BPM | DIASTOLIC BLOOD PRESSURE: 74 MMHG | HEIGHT: 77 IN | BODY MASS INDEX: 30.34 KG/M2

## 2021-05-15 VITALS
HEART RATE: 72 BPM | HEIGHT: 77 IN | WEIGHT: 259 LBS | SYSTOLIC BLOOD PRESSURE: 121 MMHG | BODY MASS INDEX: 30.58 KG/M2 | DIASTOLIC BLOOD PRESSURE: 62 MMHG | OXYGEN SATURATION: 98 %

## 2021-05-15 VITALS
BODY MASS INDEX: 30.38 KG/M2 | DIASTOLIC BLOOD PRESSURE: 69 MMHG | HEART RATE: 85 BPM | OXYGEN SATURATION: 97 % | TEMPERATURE: 97.2 F | HEIGHT: 77 IN | SYSTOLIC BLOOD PRESSURE: 116 MMHG | WEIGHT: 257.25 LBS

## 2021-05-15 VITALS
OXYGEN SATURATION: 99 % | DIASTOLIC BLOOD PRESSURE: 79 MMHG | TEMPERATURE: 97.2 F | SYSTOLIC BLOOD PRESSURE: 122 MMHG | HEART RATE: 87 BPM | HEIGHT: 77 IN | WEIGHT: 265 LBS | BODY MASS INDEX: 31.29 KG/M2

## 2021-05-17 ENCOUNTER — HOSPITAL ENCOUNTER (OUTPATIENT)
Dept: FAMILY MEDICINE CLINIC | Facility: CLINIC | Age: 41
Discharge: HOME | End: 2021-05-17
Attending: FAMILY MEDICINE

## 2021-05-17 ENCOUNTER — OFFICE VISIT CONVERTED (OUTPATIENT)
Dept: FAMILY MEDICINE CLINIC | Facility: CLINIC | Age: 41
End: 2021-05-17
Attending: FAMILY MEDICINE

## 2021-05-17 ENCOUNTER — CONVERSION ENCOUNTER (OUTPATIENT)
Dept: FAMILY MEDICINE CLINIC | Facility: CLINIC | Age: 41
End: 2021-05-17

## 2021-05-17 LAB
ALBUMIN SERPL-MCNC: 4.1 G/DL (ref 3.5–5)
ALBUMIN/GLOB SERPL: 1.2 {RATIO} (ref 1.4–2.6)
ALP SERPL-CCNC: 51 U/L (ref 53–128)
ALT SERPL-CCNC: 21 U/L (ref 10–40)
AMPHET UR QL CFM: POSITIVE
ANION GAP SERPL CALC-SCNC: 14 MMOL/L (ref 8–19)
AST SERPL-CCNC: 21 U/L (ref 15–50)
BARBITURATES UR QL: NEGATIVE
BENZODIAZ UR QL SCN: NEGATIVE
BILIRUB SERPL-MCNC: 0.24 MG/DL (ref 0.2–1.3)
BUN SERPL-MCNC: 14 MG/DL (ref 5–25)
BUN/CREAT SERPL: 14 {RATIO} (ref 6–20)
CALCIUM SERPL-MCNC: 9.1 MG/DL (ref 8.7–10.4)
CHLORIDE SERPL-SCNC: 102 MMOL/L (ref 99–111)
CHOLEST SERPL-MCNC: 178 MG/DL (ref 107–200)
CHOLEST/HDLC SERPL: 3.9 {RATIO} (ref 3–6)
CONV AMP/METHAMP UR: NEGATIVE
CONV CO2: 28 MMOL/L (ref 22–32)
CONV COCAINE, UR: NEGATIVE
CONV TOTAL PROTEIN: 7.4 G/DL (ref 6.3–8.2)
CREAT UR-MCNC: 1.02 MG/DL (ref 0.7–1.2)
GFR SERPLBLD BASED ON 1.73 SQ M-ARVRAT: >60 ML/MIN/{1.73_M2}
GLOBULIN UR ELPH-MCNC: 3.3 G/DL (ref 2–3.5)
GLUCOSE SERPL-MCNC: 94 MG/DL (ref 70–99)
HDLC SERPL-MCNC: 46 MG/DL (ref 40–60)
LDLC SERPL CALC-MCNC: 112 MG/DL (ref 70–100)
MDMA UR QL SCN: NEGATIVE
METHADONE UR QL SCN: NEGATIVE
OPIATES UR QL SCN: NEGATIVE
OSMOLALITY SERPL CALC.SUM OF ELEC: 288 MOSM/KG (ref 273–304)
OXYCODONE UR QL SCN: NEGATIVE
PCP UR QL: NEGATIVE
POTASSIUM SERPL-SCNC: 4.5 MMOL/L (ref 3.5–5.3)
SODIUM SERPL-SCNC: 139 MMOL/L (ref 135–147)
THC SERPLBLD CFM-MCNC: NEGATIVE NG/ML
TRIGL SERPL-MCNC: 101 MG/DL (ref 40–150)
VLDLC SERPL-MCNC: 20 MG/DL (ref 5–37)

## 2021-06-06 NOTE — PROGRESS NOTES
Progress Note      Patient Name: Shiva Varela   Patient ID: [id number]   Sex: Male   YOB: 1980    Primary Care Provider: Ed Myers DO    Visit Date: May 17, 2021    Provider: Ed Myers DO   Location: [facility]   Location Address: [address] [place] [address]  [id number]   Location Phone: [phone]          Chief Complaint  · 3 Month Follow Up - HTN, ADD       History Of Present Illness  Shiva Varela is a 40 year old /White male who presents for evaluation and treatment of: HTN. He states that he has NOT been checking his BP outside the office. He states that he takes his medication daily.      ADD- He states that he is taking his Adderall daily. Good focus and attention.       Past Medical History  Disease Name Date Onset Notes   ADD (attention deficit disorder) --  --    Essential hypertension 04/30/2019 --    Hernia --  --    Recurrent major depressive disorder, in full remission 04/30/2019 --    Ulnar neuropathy at elbow of left upper extremity 09/10/2020 He will work on relieving pressure off the nerve/medial elbow. IF it persist then EMG         Past Surgical History  Procedure Name Date Notes   ACL Reconstruction 2000 left knee   Appendectomy --  --    Hand surgery --  --    Hernia --  --    Meniscectomy, knee, lateral 2015 --    [place] Tooth Extraction --  --          Medication List  Name Date Started Instructions   Adderall XR 20 mg oral capsule,extended release 24hr 05/05/2021 take 1 capsule (20 mg) by oral route once daily in the morning upon awakening for 30 days   fluticasone propionate 50 mcg/actuation nasal spray,suspension 09/10/2020 SPRAY 1 SPRAY INTO EACH NOSTRIL EVERY DAY   metoprolol succinate 50 mg oral tablet extended release 24 hr 11/13/2020 take 1 tablet (50 mg) by oral route once daily for 90 days   Zoloft 100 mg oral tablet 11/13/2020 take 1 tablet (100 mg) by oral route once daily for 90 days  "        Allergy List  Allergen Name Date Reaction Notes   Codiene --  --  --        Allergies Reconciled  Family Medical History  Disease Name Relative/Age Notes   Heart Disease Grandfather (paternal)/  Grandmother (paternal)/   --    Cancer, Unspecified Grandfather (paternal)/   --    Diabetes Uncle/   --          Social History  Finding Status Start/Stop Quantity Notes   Alcohol Never --/-- --  --    Tobacco Never --/-- --  --          Immunizations  NameDate Admin Mfg Trade Name Lot Number Route Inj VIS Given VIS Publication   Ufadwifcs45/01/2020 SKB Fluarix, quadrivalent, preservative free [id number] NE NE 10/01/2020    Comments: [facility] in [place]         Review of Systems  · Constitutional  o Denies  o : fatigue  · HENT  o Denies  o : headaches  · Cardiovascular  o Denies  o : irregular heart beats, rapid heart rate  · Respiratory  o Denies  o : shortness of breath, wheezing, cough  · Neurologic  o Denies  o : difficulty concentrating  · Psychiatric  o Denies  o : inattentiveness      Vitals  Date Time BP Position Site L\R Cuff Size HR RR TEMP (F) WT  HT  BMI kg/m2 BSA m2 O2 Sat FR L/min FiO2 HC       11/13/2020 10:52 /79 Sitting    79 - R  97.5 265lbs 2oz 6'  5\" 31.44 2.56 100 %  21%    03/18/2021 09:23 /83 Sitting    80 - R  97.6 267lbs 4oz 6'  5\" 31.69 2.57 100 %  21%    05/17/2021 11:11 /82 Sitting    79 - R  97.4 267lbs 8oz 6'  5\" 31.72 2.57 99 %  21%          Physical Examination  · Constitutional  o Appearance  o : well-nourished, well developed, alert, in no acute distress, well-tended appearance  · Head and Face  o Head  o :   § Inspection  § : atraumatic, normocephalic  o Face  o :   § Inspection  § : no facial lesions  o HEENT  o : Unremarkable  · Eyes  o Conjunctivae  o : conjunctivae normal  o Sclerae  o : sclerae white  o Pupils and Irises  o : pupils equal and round, pupils reactive to light bilaterally  o Eyelids/Ocular Adnexae  o : eyelid appearance normal  · Ears, Nose, " Mouth and Throat  o Ears  o :   § External Ears  § : appearance within normal limits, no lesions present  § Otoscopic Examination  § : tympanic membrane appearance within normal limits bilaterally without perforations, mobility normal  o Nose  o :   § External Nose  § : appearance normal  o Oral Cavity  o :   § Oral Mucosa  § : oral mucosa normal  § Lips  § : lip appearance normal  § Teeth  § : normal dentition for age  § Gums  § : gums pink, non-swollen, no bleeding present  § Tongue  § : tongue appearance normal  § Palate  § : hard palate normal, soft palate appearance normal  o Throat  o :   § Oropharynx  § : no inflammation or lesions present, tonsils within normal limits  · Neck  o Inspection/Palpation  o : normal appearance, no masses or tenderness, trachea midline  o Thyroid  o : gland size normal, nontender, no nodules or masses present on palpation  · Respiratory  o Respiratory Effort  o : breathing unlabored  o Auscultation of Lungs  o : normal breath sounds  · Cardiovascular  o Heart  o :   § Auscultation of Heart  § : regular rate, normal rhythm, no murmurs present  o Peripheral Vascular System  o :   § Extremities  § : no edema  · Lymphatic  o Neck  o : no lymphadenopathy           Results  · In-Office Procedures  o Lab procedure  § IOP - Urine Drug Screen In-House [facility] (67421)   § Amphetamines Ur Ql: Positive   § Barbiturates Ur Ql: Negative   § Buprenorphine+Nor Ur Ql Scn: Negative   § Benzodiaz Ur Ql: Negative   § Cocaine Ur Ql: Negative   § Methadone Ur Ql: Negative   § Methamphet Ur Ql: Negative   § MDMA Ur Ql Scn: Negative   § Opiates Ur Ql: Negative   § Oxycodone Ur Ql: Negative   § PCP Ur Ql: Negative   § THC Ur Ql: Negative   § Temp in Range?: Within/Acceptable   § Control Seen?: Yes       Assessment  · Essential hypertension     401.9/I10  good control Will update his labs  · ADD (attention deficit disorder)     314.00/F98.8  he is doing well.       Plan  · Orders  o [facility] (63104) - 401.9/I10  - 05/17/2021  o Lipid Panel [facility] (79212) - 401.9/I10 - 05/17/2021  o ACO-39: Current medications updated and reviewed (1159F, ) - - 05/17/2021  · Medications  o Medications have been Reconciled  o Transition of Care or Provider Policy  · Instructions  o Patient advised to monitor blood pressure (B/P) at home and journal readings. Patient informed that a B/P reading at home of more than 130/80 is considered hypertension. For readings greater ucfk679/90 or higher patient is advised to follow up in the office with readings for management. Patient advised to limit sodium intake.  o Patient is taking medications as prescribed and doing well.   o Take all medications as prescribed/directed.  o Patient instructed/educated on their diet and exercise program.  o Patient was educated/instructed on their diagnosis, treatment and medications prior to discharge from the clinic today.  o Patient instructed to seek medical attention urgently for new or worsening symptoms.  o Call the office with any concerns or questions.  o Bring all medicines with their bottles to each office visit.  · Disposition  o Call or Return if symptoms worsen or persist.  o Return Visit Request in/on 3 months +/- 2 days ([id number]).            Electronically Signed by: Ed Myers DO -Author on May 17, 2021 01:12:18 PM

## 2021-06-07 ENCOUNTER — TELEPHONE (OUTPATIENT)
Dept: FAMILY MEDICINE CLINIC | Facility: CLINIC | Age: 41
End: 2021-06-07

## 2021-06-07 RX ORDER — DEXTROAMPHETAMINE SULFATE, DEXTROAMPHETAMINE SACCHARATE, AMPHETAMINE SULFATE AND AMPHETAMINE ASPARTATE 5; 5; 5; 5 MG/1; MG/1; MG/1; MG/1
1 CAPSULE, EXTENDED RELEASE ORAL DAILY
COMMUNITY
Start: 2021-05-06 | End: 2021-06-07 | Stop reason: SDUPTHER

## 2021-06-07 RX ORDER — DEXTROAMPHETAMINE SULFATE, DEXTROAMPHETAMINE SACCHARATE, AMPHETAMINE SULFATE AND AMPHETAMINE ASPARTATE 5; 5; 5; 5 MG/1; MG/1; MG/1; MG/1
20 CAPSULE, EXTENDED RELEASE ORAL DAILY
Qty: 30 CAPSULE | Refills: 0 | Status: SHIPPED | OUTPATIENT
Start: 2021-06-07 | End: 2021-06-11 | Stop reason: SDUPTHER

## 2021-06-07 NOTE — TELEPHONE ENCOUNTER
Caller: Shiva Varela    Relationship: Self    Best call back number: [phone]    Medication needed:   Requested Prescriptions      No prescriptions requested or ordered in this encounter     REQUESTING ADDERRALL   When do you need the refill by: ASAP        Does the patient have less than a 3 day supply:  [x] Yes  [] No    What is the patient's preferred pharmacy: [facility]/PHARMACY #[id number] - [place], [place] - [address]  [phone] [facility] [phone] FX

## 2021-06-07 NOTE — TELEPHONE ENCOUNTER
Caller: Shiva Varela    Relationship: Self    Best call back number: [phone]     Medication needed:   Requested Prescriptions      No prescriptions requested or ordered in this encounter   ADDERALL 20MG    When do you need the refill by: ASAP    What additional details did the patient provide when requesting the medication: PATIENT HAS A TWO DAY SUPPLY. PATIENT IS LEAVING TOWN TODAY. PATIENT MEDICATION DID NOT POPULATE. MEDICATION IS LISTED BELOW REQUESTED PRESCRIPTIONS.     Does the patient have less than a 3 day supply:  [x] Yes  [] No    What is the patient's preferred pharmacy:      [facility]/pharmacy #[id number] - [place], [place] - [address] - [phone] [facility] [phone]   [phone]

## 2021-06-11 ENCOUNTER — TELEPHONE (OUTPATIENT)
Dept: FAMILY MEDICINE CLINIC | Facility: CLINIC | Age: 41
End: 2021-06-11

## 2021-06-11 DIAGNOSIS — F90.0 ATTENTION DEFICIT HYPERACTIVITY DISORDER (ADHD), PREDOMINANTLY INATTENTIVE TYPE: Primary | ICD-10-CM

## 2021-06-11 PROBLEM — F33.42 MAJOR DEPRESSIVE DISORDER, RECURRENT EPISODE, IN FULL REMISSION: Status: ACTIVE | Noted: 2019-04-30

## 2021-06-11 PROBLEM — I10 ESSENTIAL HYPERTENSION: Status: ACTIVE | Noted: 2019-04-30

## 2021-06-11 PROBLEM — F90.9 ATTENTION DEFICIT HYPERACTIVITY DISORDER (ADHD): Status: ACTIVE | Noted: 2021-06-11

## 2021-06-11 RX ORDER — METOPROLOL SUCCINATE 50 MG/1
TABLET, EXTENDED RELEASE ORAL
COMMUNITY
Start: 2021-06-11 | End: 2021-11-19 | Stop reason: SDUPTHER

## 2021-06-11 RX ORDER — DEXTROAMPHETAMINE SACCHARATE, AMPHETAMINE ASPARTATE MONOHYDRATE, DEXTROAMPHETAMINE SULFATE AND AMPHETAMINE SULFATE 5; 5; 5; 5 MG/1; MG/1; MG/1; MG/1
20 CAPSULE, EXTENDED RELEASE ORAL DAILY
Qty: 30 CAPSULE | Refills: 0 | Status: SHIPPED | OUTPATIENT
Start: 2021-06-11 | End: 2021-08-05 | Stop reason: SDUPTHER

## 2021-06-11 RX ORDER — DEXTROAMPHETAMINE SACCHARATE, AMPHETAMINE ASPARTATE MONOHYDRATE, DEXTROAMPHETAMINE SULFATE AND AMPHETAMINE SULFATE 5; 5; 5; 5 MG/1; MG/1; MG/1; MG/1
1 CAPSULE, EXTENDED RELEASE ORAL DAILY
COMMUNITY
Start: 2021-05-05 | End: 2021-06-11 | Stop reason: SDUPTHER

## 2021-06-11 NOTE — TELEPHONE ENCOUNTER
Caller: Shiva Varela    Relationship to patient: Self    Best call back number: [phone]     Patient is needing: PATIENT CALLED TO UPDATE HIS PHARMACY. HIS NEW PHARMACY IS     [id number] [place] [address] [place] [facility] - [phone] [facility] [phone]   [phone]

## 2021-06-11 NOTE — TELEPHONE ENCOUNTER
Called patient to discuss pharmacy. Patient is moving to [place] for work temporarily and will need to  his medications there for the time being.

## 2021-07-15 VITALS
OXYGEN SATURATION: 99 % | HEIGHT: 77 IN | SYSTOLIC BLOOD PRESSURE: 120 MMHG | DIASTOLIC BLOOD PRESSURE: 82 MMHG | TEMPERATURE: 97.4 F | WEIGHT: 267.5 LBS | HEART RATE: 79 BPM | BODY MASS INDEX: 31.59 KG/M2

## 2021-08-05 DIAGNOSIS — F90.0 ATTENTION DEFICIT HYPERACTIVITY DISORDER (ADHD), PREDOMINANTLY INATTENTIVE TYPE: ICD-10-CM

## 2021-08-05 RX ORDER — DEXTROAMPHETAMINE SACCHARATE, AMPHETAMINE ASPARTATE MONOHYDRATE, DEXTROAMPHETAMINE SULFATE AND AMPHETAMINE SULFATE 5; 5; 5; 5 MG/1; MG/1; MG/1; MG/1
20 CAPSULE, EXTENDED RELEASE ORAL DAILY
Qty: 30 CAPSULE | Refills: 0 | Status: SHIPPED | OUTPATIENT
Start: 2021-08-05 | End: 2021-08-09 | Stop reason: SDUPTHER

## 2021-08-05 NOTE — TELEPHONE ENCOUNTER
Caller: Shiva Varela    Relationship: Self    Best call back number: [phone]    Medication needed:   Requested Prescriptions     Pending Prescriptions Disp Refills   • amphetamine-dextroamphetamine XR (Adderall XR) 20 MG 24 hr capsule 30 capsule 0     Sig: Take 1 capsule by mouth Daily       When do you need the refill by: 08/06/2021    Does the patient have less than a 3 day supply:  [x] Yes  [] No    What is the patient's preferred pharmacy: [facility] [address] [address] [facility] [phone] [facility] [phone] FX

## 2021-08-09 DIAGNOSIS — F90.0 ATTENTION DEFICIT HYPERACTIVITY DISORDER (ADHD), PREDOMINANTLY INATTENTIVE TYPE: ICD-10-CM

## 2021-08-09 RX ORDER — DEXTROAMPHETAMINE SACCHARATE, AMPHETAMINE ASPARTATE MONOHYDRATE, DEXTROAMPHETAMINE SULFATE AND AMPHETAMINE SULFATE 5; 5; 5; 5 MG/1; MG/1; MG/1; MG/1
20 CAPSULE, EXTENDED RELEASE ORAL DAILY
Qty: 30 CAPSULE | Refills: 0 | Status: SHIPPED | OUTPATIENT
Start: 2021-08-09 | End: 2021-09-03 | Stop reason: SDUPTHER

## 2021-08-09 NOTE — TELEPHONE ENCOUNTER
Patient called stating he spoke with someone on Thursday about refill, patient states he was supposed to be moving to [place] but did not get the job and told that to person on Thursday, checked chart was still sent to [facility].    Please send to [facility] in [place]. Pharmacy corrected in chart.

## 2021-08-17 ENCOUNTER — OFFICE VISIT (OUTPATIENT)
Dept: FAMILY MEDICINE CLINIC | Facility: CLINIC | Age: 41
End: 2021-08-17

## 2021-08-17 VITALS
SYSTOLIC BLOOD PRESSURE: 125 MMHG | BODY MASS INDEX: 31.53 KG/M2 | WEIGHT: 267 LBS | TEMPERATURE: 97.2 F | HEART RATE: 87 BPM | DIASTOLIC BLOOD PRESSURE: 57 MMHG | HEIGHT: 77 IN | OXYGEN SATURATION: 100 %

## 2021-08-17 DIAGNOSIS — R39.12 BENIGN PROSTATIC HYPERPLASIA WITH WEAK URINARY STREAM: ICD-10-CM

## 2021-08-17 DIAGNOSIS — F90.0 ATTENTION DEFICIT HYPERACTIVITY DISORDER (ADHD), PREDOMINANTLY INATTENTIVE TYPE: ICD-10-CM

## 2021-08-17 DIAGNOSIS — N40.1 BENIGN PROSTATIC HYPERPLASIA WITH WEAK URINARY STREAM: ICD-10-CM

## 2021-08-17 DIAGNOSIS — R39.198 DIFFICULTY URINATING: Primary | ICD-10-CM

## 2021-08-17 LAB
BILIRUB BLD-MCNC: NEGATIVE MG/DL
CLARITY, POC: CLEAR
COLOR UR: YELLOW
GLUCOSE UR STRIP-MCNC: NEGATIVE MG/DL
KETONES UR QL: NEGATIVE
LEUKOCYTE EST, POC: NEGATIVE
NITRITE UR-MCNC: NEGATIVE MG/ML
PH UR: 7.5 [PH] (ref 5–8)
PROT UR STRIP-MCNC: NEGATIVE MG/DL
RBC # UR STRIP: NEGATIVE /UL
SP GR UR: 1.02 (ref 1–1.03)
UROBILINOGEN UR QL: NORMAL

## 2021-08-17 PROCEDURE — 99214 OFFICE O/P EST MOD 30 MIN: CPT | Performed by: FAMILY MEDICINE

## 2021-08-17 PROCEDURE — 81003 URINALYSIS AUTO W/O SCOPE: CPT | Performed by: FAMILY MEDICINE

## 2021-08-17 RX ORDER — TAMSULOSIN HYDROCHLORIDE 0.4 MG/1
1 CAPSULE ORAL DAILY
Qty: 30 CAPSULE | Refills: 12 | Status: SHIPPED | OUTPATIENT
Start: 2021-08-17 | End: 2021-09-16

## 2021-08-17 RX ORDER — SERTRALINE HYDROCHLORIDE 100 MG/1
100 TABLET, FILM COATED ORAL DAILY
COMMUNITY
Start: 2021-06-11 | End: 2021-11-19 | Stop reason: SDUPTHER

## 2021-08-17 NOTE — ASSESSMENT & PLAN NOTE
He most likely has some mild BPH.  We will check a urinalysis to make sure this is fine if so we will start him on some tamsulosin.

## 2021-08-17 NOTE — PROGRESS NOTES
Chief Complaint   Patient presents with   • Follow-up     3 mo f/u- difficulty urinating         Subjective     Shiva Varela  has a past medical history of ADD (attention deficit disorder), Condition not found, Essential hypertension (04/30/2019), Recurrent major depressive disorder, in full remission (CMS/[facility]) (04/30/2019), and Ulnar neuropathy at elbow of left upper extremity (09/10/2020).    ADD-he states he is doing well with his current medication and dosage.  No difficulty with focus concentration.  He denies any palpitations tachycardia loss of appetite or unexplained weight loss.    Difficulty urinating-he states he has had a slow weak stream.  Intermittent trickling of his urine stream as well.  He has nocturia at least once per night.      PHQ-2 Depression Screening  Little interest or pleasure in doing things?     Feeling down, depressed, or hopeless?     PHQ-2 Total Score     PHQ-9 Depression Screening  Little interest or pleasure in doing things?     Feeling down, depressed, or hopeless?     Trouble falling or staying asleep, or sleeping too much?     Feeling tired or having little energy?     Poor appetite or overeating?     Feeling bad about yourself - or that you are a failure or have let yourself or your family down?     Trouble concentrating on things, such as reading the newspaper or watching television?     Moving or speaking so slowly that other people could have noticed? Or the opposite - being so fidgety or restless that you have been moving around a lot more than usual?     Thoughts that you would be better off dead, or of hurting yourself in some way?     PHQ-9 Total Score     If you checked off any problems, how difficult have these problems made it for you to do your work, take care of things at home, or get along with other people?       Allergies   Allergen Reactions   • Bee Venom Unknown - Low Severity   • Codeine Unknown - Low Severity   • Latex Unknown - Low Severity     Persistent  Contact        Prior to Admission medications    Medication Sig Start Date End Date Taking? Authorizing Provider   amphetamine-dextroamphetamine XR (Adderall XR) 20 MG 24 hr capsule Take 1 capsule by mouth Daily 8/9/21  Yes Ed Myers DO   metoprolol succinate XL (TOPROL-XL) 50 MG 24 hr tablet  6/11/21  Yes Ramona Coles MD   sertraline (ZOLOFT) 100 MG tablet Take 100 mg by mouth Daily. 6/11/21  Yes Ramona Coles MD        Patient Active Problem List   Diagnosis   • Attention deficit hyperactivity disorder (ADHD)   • Essential hypertension   • Major depressive disorder, recurrent episode, in full remission (CMS/[facility])   • Difficulty urinating   • Benign prostatic hyperplasia with weak urinary stream        Past Surgical History:   Procedure Laterality Date   • APPENDECTOMY     • HAND SURGERY     • HERNIA REPAIR     • KNEE ACL RECONSTRUCTION Left 2000   • KNEE MENISCECTOMY  2015    lateral   • WISDOM TOOTH EXTRACTION         Social History     Socioeconomic History   • Marital status: Single     Spouse name: Not on file   • Number of children: Not on file   • Years of education: Not on file   • Highest education level: Not on file   Tobacco Use   • Smoking status: Never Smoker   • Smokeless tobacco: Never Used   Substance and Sexual Activity   • Alcohol use: Never   • Drug use: Never       Family History   Problem Relation Age of Onset   • Heart disease Paternal Grandmother    • Heart disease Paternal Grandfather    • Cancer Paternal Grandfather    • Diabetes Other        Family history, surgical history, past medical history, Allergies and med's reviewed with patient today and updated in [facility] EMR.     ROS:  Review of Systems   Constitutional: Negative for fatigue.   Genitourinary: Positive for difficulty urinating and nocturia. Negative for dysuria and hematuria.   Psychiatric/Behavioral: Negative for decreased concentration.       OBJECTIVE:  Vitals:    08/17/21 1111   BP: 125/57   BP  "Location: Left arm   Patient Position: Sitting   Cuff Size: Adult   Pulse: 87   Temp: 97.2 °F (36.2 °C)   TempSrc: Temporal   SpO2: 100%   Weight: 121 kg (267 lb)   Height: 195.6 cm (77\")     No exam data present   Body mass index is 31.66 kg/m².  No LMP for male patient.    Physical Exam  Vitals and nursing note reviewed.   Constitutional:       General: He is not in acute distress.     Appearance: Normal appearance. He is normal weight.   HENT:      Head: Normocephalic and atraumatic.   Cardiovascular:      Rate and Rhythm: Normal rate and regular rhythm.      Heart sounds: Normal heart sounds. No murmur heard.     Pulmonary:      Effort: Pulmonary effort is normal. No respiratory distress.      Breath sounds: Normal breath sounds. No wheezing, rhonchi or rales.   Neurological:      Mental Status: He is alert.         Procedures    Office Visit on 08/17/2021   Component Date Value Ref Range Status   • Color 08/17/2021 Yellow  Yellow, Straw, Dark Yellow, Claudia Final   • Clarity, UA 08/17/2021 Clear  Clear Final   • Specific Gravity  08/17/2021 1.025  1.005 - 1.030 Final   • pH, Urine 08/17/2021 7.5  5.0 - 8.0 Final   • Leukocytes 08/17/2021 Negative  Negative Final   • Nitrite, UA 08/17/2021 Negative  Negative Final   • Protein, POC 08/17/2021 Negative  Negative mg/dL Final   • Glucose, UA 08/17/2021 Negative  Negative, 1000 mg/dL (3+) mg/dL Final   • Ketones, UA 08/17/2021 Negative  Negative Final   • Urobilinogen, UA 08/17/2021 Normal  Normal Final   • Bilirubin 08/17/2021 Negative  Negative Final   • Blood, UA 08/17/2021 Negative  Negative Final       ASSESSMENT/ PLAN:    Diagnoses and all orders for this visit:    1. Difficulty urinating (Primary)  Assessment & Plan:  He probably has some mild BPH.  We will check a urinalysis to make sure this is fine if so we will start him on some tamsulosin.    Orders:  -     POCT urinalysis dipstick, automated    2. Attention deficit hyperactivity disorder (ADHD), " predominantly inattentive type  Assessment & Plan:  He is doing well with his current medication and dosage.  We will continue it at its present dosage.      3. Benign prostatic hyperplasia with weak urinary stream  Assessment & Plan:  We will add a course of tamsulosin and reevaluate.        Other orders  -     tamsulosin (FLOMAX) 0.4 MG capsule 24 hr capsule; Take 1 capsule by mouth Daily for 30 days.  Dispense: 30 capsule; Refill: 12      Orders Placed Today:     New Medications Ordered This Visit   Medications   • tamsulosin (FLOMAX) 0.4 MG capsule 24 hr capsule     Sig: Take 1 capsule by mouth Daily for 30 days.     Dispense:  30 capsule     Refill:  12        Management Plan:     An After Visit Summary was printed and given to the patient at discharge.    Follow-up: No follow-ups on file.    Ed Myers DO 8/17/2021 12:23 EDT  This note was electronically signed.

## 2021-08-17 NOTE — ASSESSMENT & PLAN NOTE
He is doing well with his current medication and dosage.  We will continue it at its present dosage.

## 2021-10-04 DIAGNOSIS — F90.0 ATTENTION DEFICIT HYPERACTIVITY DISORDER (ADHD), PREDOMINANTLY INATTENTIVE TYPE: ICD-10-CM

## 2021-10-04 RX ORDER — DEXTROAMPHETAMINE SACCHARATE, AMPHETAMINE ASPARTATE MONOHYDRATE, DEXTROAMPHETAMINE SULFATE AND AMPHETAMINE SULFATE 5; 5; 5; 5 MG/1; MG/1; MG/1; MG/1
20 CAPSULE, EXTENDED RELEASE ORAL DAILY
Qty: 30 CAPSULE | Refills: 0 | Status: SHIPPED | OUTPATIENT
Start: 2021-10-04 | End: 2021-10-08 | Stop reason: SDUPTHER

## 2021-10-04 NOTE — TELEPHONE ENCOUNTER
Refill request for  controlled substance.      Date of request: 10/4/2021  Medication requested: Massiel  Last OV: 8/17/2021  Last UDS: 5/17/2021  Next office visit: 11/19/2021    Cheri Leslie

## 2021-10-04 NOTE — TELEPHONE ENCOUNTER
Caller: Shiva Varela    Relationship: Self      Medication requested (name and dosage): amphetamine-dextroamphetamine XR (Adderall XR) 20 MG 24 hr capsule    Pharmacy where request should be sent: [facility]/pharmacy #[id number] - [place], [place] - [address] [facility] - [phone]  - [phone] FX     Additional details provided by patient:     Best call back number:[phone]     Does the patient have less than a 3 day supply:  [] Yes  [x] No    Demetria SNELL Rep   10/04/21 08:02 EDT

## 2021-10-06 ENCOUNTER — TELEPHONE (OUTPATIENT)
Dept: FAMILY MEDICINE CLINIC | Facility: CLINIC | Age: 41
End: 2021-10-06

## 2021-10-06 NOTE — TELEPHONE ENCOUNTER
Caller: Shiva Varela    Relationship: Self    Best call back number: [phone]    What medications are you currently taking:   Current Outpatient Medications on File Prior to Visit   Medication Sig Dispense Refill   • amphetamine-dextroamphetamine XR (Adderall XR) 20 MG 24 hr capsule Take 1 capsule by mouth Daily 30 capsule 0   • metoprolol succinate XL (TOPROL-XL) 50 MG 24 hr tablet      • sertraline (ZOLOFT) 100 MG tablet Take 100 mg by mouth Daily.       No current facility-administered medications on file prior to visit.        Which medication are you concerned about:    amphetamine-dextroamphetamine XR (Adderall XR) 20 MG 24 hr capsule         Who prescribed you this medication:DR SAENZ     What are your concerns: PATIENT STATES THAT HE IS CURRENTLY OUT OF TOWN AND NEEDS THIS MEDICATION SENT TO A DIFFERENT [facility] IN THE MEANTIME.     [facility]   [address] [address] [phone]

## 2021-10-08 ENCOUNTER — TELEPHONE (OUTPATIENT)
Dept: FAMILY MEDICINE CLINIC | Facility: CLINIC | Age: 41
End: 2021-10-08

## 2021-10-08 DIAGNOSIS — F90.0 ATTENTION DEFICIT HYPERACTIVITY DISORDER (ADHD), PREDOMINANTLY INATTENTIVE TYPE: ICD-10-CM

## 2021-10-08 RX ORDER — DEXTROAMPHETAMINE SACCHARATE, AMPHETAMINE ASPARTATE MONOHYDRATE, DEXTROAMPHETAMINE SULFATE AND AMPHETAMINE SULFATE 5; 5; 5; 5 MG/1; MG/1; MG/1; MG/1
20 CAPSULE, EXTENDED RELEASE ORAL DAILY
Qty: 30 CAPSULE | Refills: 0 | Status: SHIPPED | OUTPATIENT
Start: 2021-10-08 | End: 2021-11-02 | Stop reason: SDUPTHER

## 2021-10-08 NOTE — TELEPHONE ENCOUNTER
I assume this was a [facility] pharmacy in [facility].  Thus that is where I sent it.

## 2021-10-08 NOTE — TELEPHONE ENCOUNTER
Caller: Shiva Varela    Relationship: Self    Best call back number:[phone]      What is the best time to reach you: ANY     Who are you requesting to speak with (clinical staff, provider,  specific staff member): CLINICAL     What was the call regarding: PATIENT STATED THAT HE NEEDS A PRESCRIPTION REWRITE FOR PHARMACY IN [address] [phone] PATIENT IS NEEDING THIS BECAUSE PHARMACY IN [place] STATED THEY CANT SEND TO ANOTHER PHARMACY DUE TO MEDICATION BEING A CONTROLLED SUBSTANCE PATIENT ONLY HAS ONE PILL LEFT. HE WOULD LIKE A CALL BACK      Do you require a callback: YES

## 2021-11-02 DIAGNOSIS — F90.0 ATTENTION DEFICIT HYPERACTIVITY DISORDER (ADHD), PREDOMINANTLY INATTENTIVE TYPE: ICD-10-CM

## 2021-11-02 NOTE — TELEPHONE ENCOUNTER
Caller: Shiva Varela    Relationship: Self      Requested Prescriptions:   Requested Prescriptions     Pending Prescriptions Disp Refills   • amphetamine-dextroamphetamine XR (Adderall XR) 20 MG 24 hr capsule 30 capsule 0     Sig: Take 1 capsule by mouth Daily        Pharmacy where request should be sent:   [facility]/pharmacy #[id number] - [place], [place] - [address] - [phone] [facility] [phone]   [phone]    Additional details provided by patient: PATIENT HAS A COUPLE OF DAYS LEFT.     Best call back number: [phone]    Does the patient have less than a 3 day supply:  [x] Yes  [] No    Demetria LOMBARDI Rep   11/02/21 15:51 EDT

## 2021-11-03 RX ORDER — DEXTROAMPHETAMINE SACCHARATE, AMPHETAMINE ASPARTATE MONOHYDRATE, DEXTROAMPHETAMINE SULFATE AND AMPHETAMINE SULFATE 5; 5; 5; 5 MG/1; MG/1; MG/1; MG/1
20 CAPSULE, EXTENDED RELEASE ORAL DAILY
Qty: 30 CAPSULE | Refills: 0 | Status: SHIPPED | OUTPATIENT
Start: 2021-11-03 | End: 2021-12-03 | Stop reason: SDUPTHER

## 2021-11-04 ENCOUNTER — TELEPHONE (OUTPATIENT)
Dept: FAMILY MEDICINE CLINIC | Facility: CLINIC | Age: 41
End: 2021-11-04

## 2021-11-04 NOTE — TELEPHONE ENCOUNTER
Caller: Shiva Varela    Relationship: Self    Best call back number: [phone]    What medications are you currently taking:   Current Outpatient Medications on File Prior to Visit   Medication Sig Dispense Refill   • amphetamine-dextroamphetamine XR (Adderall XR) 20 MG 24 hr capsule Take 1 capsule by mouth Daily 30 capsule 0   • metoprolol succinate XL (TOPROL-XL) 50 MG 24 hr tablet      • sertraline (ZOLOFT) 100 MG tablet Take 100 mg by mouth Daily.       No current facility-administered medications on file prior to visit.      When did you start taking these medications: PATIENT STATED THAT HE WAS UNSURE OF EXACT DATE 8/2021    Which medication are you concerned about: TAMSULOSIN 0.4 MG    Who prescribed you this medication: DANY    What are your concerns: PATIENT STATED THAT HE IS EXPERIENCING DIZZINESS AND HAS CONCERNS ABOUT BEING ON ALPHA AND BATA BLOCKER, SHOULD HE DISCONTINUE MEDICATION AND WANTS TO DISCUSS IF APPOINTMENT IS NECESSARY      How long have you had these concerns: SINCE 8/2021

## 2021-11-08 NOTE — TELEPHONE ENCOUNTER
Pt does not check b/p at home. Pt takes Tamsulosin 30 mins after dinner or at bedtime if he forgets to take after dinner.

## 2021-11-08 NOTE — TELEPHONE ENCOUNTER
Patient states he stopped taking the TAMSULOSIN and his dizziness has gone away. He has an appointment to see you on 11/19/2021 and I advised him to talk to you about his medications.

## 2021-11-19 ENCOUNTER — OFFICE VISIT (OUTPATIENT)
Dept: FAMILY MEDICINE CLINIC | Facility: CLINIC | Age: 41
End: 2021-11-19

## 2021-11-19 VITALS
BODY MASS INDEX: 32.28 KG/M2 | SYSTOLIC BLOOD PRESSURE: 126 MMHG | DIASTOLIC BLOOD PRESSURE: 71 MMHG | WEIGHT: 272.2 LBS | TEMPERATURE: 97 F | HEART RATE: 80 BPM | OXYGEN SATURATION: 99 %

## 2021-11-19 DIAGNOSIS — F90.0 ATTENTION DEFICIT HYPERACTIVITY DISORDER (ADHD), PREDOMINANTLY INATTENTIVE TYPE: Primary | ICD-10-CM

## 2021-11-19 DIAGNOSIS — R39.12 BENIGN PROSTATIC HYPERPLASIA WITH WEAK URINARY STREAM: ICD-10-CM

## 2021-11-19 DIAGNOSIS — I10 ESSENTIAL HYPERTENSION: ICD-10-CM

## 2021-11-19 DIAGNOSIS — N40.1 BENIGN PROSTATIC HYPERPLASIA WITH WEAK URINARY STREAM: ICD-10-CM

## 2021-11-19 PROCEDURE — 99213 OFFICE O/P EST LOW 20 MIN: CPT | Performed by: FAMILY MEDICINE

## 2021-11-19 RX ORDER — SERTRALINE HYDROCHLORIDE 100 MG/1
100 TABLET, FILM COATED ORAL DAILY
Qty: 90 TABLET | Refills: 3 | Status: SHIPPED | OUTPATIENT
Start: 2021-11-19 | End: 2022-02-18 | Stop reason: SDUPTHER

## 2021-11-19 RX ORDER — ERGOCALCIFEROL 1.25 MG/1
CAPSULE ORAL
COMMUNITY

## 2021-11-19 RX ORDER — METOPROLOL SUCCINATE 50 MG/1
50 TABLET, EXTENDED RELEASE ORAL DAILY
Qty: 90 TABLET | Refills: 3 | Status: SHIPPED | OUTPATIENT
Start: 2021-11-19 | End: 2022-02-18 | Stop reason: SDUPTHER

## 2021-11-19 RX ORDER — DOXAZOSIN 2 MG/1
2 TABLET ORAL NIGHTLY
Qty: 90 TABLET | Refills: 1 | Status: SHIPPED | OUTPATIENT
Start: 2021-11-19 | End: 2022-02-18

## 2021-11-19 RX ORDER — TAMSULOSIN HYDROCHLORIDE 0.4 MG/1
CAPSULE ORAL
COMMUNITY
Start: 2021-11-12 | End: 2021-11-19

## 2021-11-19 NOTE — ASSESSMENT & PLAN NOTE
We will try an alternative alpha-blocker to see if that will help with his symptoms without any dizziness.

## 2021-11-19 NOTE — PROGRESS NOTES
Chief Complaint   Patient presents with   • Follow-up     3 months        Subjective     Shiva Varela  has a past medical history of Essential hypertension (04/30/2019), Recurrent major depressive disorder, in full remission (HCC) (04/30/2019), and Ulnar neuropathy at elbow of left upper extremity (09/10/2020).    Hypertension-he does not check his blood pressure outside the office.  His blood pressure is great here today at 126/71.    BPH-he still has somewhat of a slow stream.  He tried the tamsulosin for a couple months but it caused a lot of dizziness and grogginess in the morning and he discontinued it.  His urine stream was improved while taking it, but back to his previous state upon discontinuing it.    ADD-he states his symptoms are well controlled with his current dose of medication.      PHQ-2 Depression Screening  Little interest or pleasure in doing things?     Feeling down, depressed, or hopeless?     PHQ-2 Total Score     PHQ-9 Depression Screening  Little interest or pleasure in doing things?     Feeling down, depressed, or hopeless?     Trouble falling or staying asleep, or sleeping too much?     Feeling tired or having little energy?     Poor appetite or overeating?     Feeling bad about yourself - or that you are a failure or have let yourself or your family down?     Trouble concentrating on things, such as reading the newspaper or watching television?     Moving or speaking so slowly that other people could have noticed? Or the opposite - being so fidgety or restless that you have been moving around a lot more than usual?     Thoughts that you would be better off dead, or of hurting yourself in some way?     PHQ-9 Total Score     If you checked off any problems, how difficult have these problems made it for you to do your work, take care of things at home, or get along with other people?       Allergies   Allergen Reactions   • Bee Venom Unknown - Low Severity   • Codeine Unknown - Low  Severity   • Latex Unknown - Low Severity     Persistent Contact        Prior to Admission medications    Medication Sig Start Date End Date Taking? Authorizing Provider   amphetamine-dextroamphetamine XR (Adderall XR) 20 MG 24 hr capsule Take 1 capsule by mouth Daily 11/3/21  Yes Ed Myers,    ergocalciferol (ERGOCALCIFEROL) 1.25 MG (26050 UT) capsule vitamin d 48426 unit caps   Yes Ramona Coles MD   metoprolol succinate XL (TOPROL-XL) 50 MG 24 hr tablet  6/11/21  Yes ProviderRamona MD   sertraline (ZOLOFT) 100 MG tablet Take 100 mg by mouth Daily. 6/11/21  Yes Ramona Coles MD   tamsulosin (FLOMAX) 0.4 MG capsule 24 hr capsule  11/12/21 11/19/21  ProviderRamona MD        Patient Active Problem List   Diagnosis   • Attention deficit hyperactivity disorder (ADHD)   • Essential hypertension   • Major depressive disorder, recurrent episode, in full remission (HCC)   • Difficulty urinating   • Benign prostatic hyperplasia with weak urinary stream        Past Surgical History:   Procedure Laterality Date   • APPENDECTOMY     • HAND SURGERY     • HERNIA REPAIR     • KNEE ACL RECONSTRUCTION Left 2000   • KNEE MENISCECTOMY  2015    lateral   • WISDOM TOOTH EXTRACTION         Social History     Socioeconomic History   • Marital status: Single   Tobacco Use   • Smoking status: Never Smoker   • Smokeless tobacco: Never Used   Substance and Sexual Activity   • Alcohol use: Never   • Drug use: Never       Family History   Problem Relation Age of Onset   • Heart disease Paternal Grandmother    • Heart disease Paternal Grandfather    • Cancer Paternal Grandfather    • Diabetes Other        Family history, surgical history, past medical history, Allergies and med's reviewed with patient today and updated in [facility] EMR.     ROS:  Review of Systems   Constitutional: Negative for fatigue.   Respiratory: Negative for cough, chest tightness, shortness of breath and wheezing.     Cardiovascular: Negative for chest pain and palpitations.   Genitourinary: Positive for difficulty urinating. Negative for nocturia.   Neurological: Positive for light-headedness. Negative for headache.   Psychiatric/Behavioral: Negative for decreased concentration and negative for hyperactivity.       OBJECTIVE:  Vitals:    11/19/21 0951   BP: 126/71   BP Location: Left arm   Patient Position: Sitting   Cuff Size: Adult   Pulse: 80   Temp: 97 °F (36.1 °C)   TempSrc: Temporal   SpO2: 99%   Weight: 123 kg (272 lb 3.2 oz)     No exam data present   Body mass index is 32.28 kg/m².  No LMP for male patient.    Physical Exam  Vitals and nursing note reviewed.   Constitutional:       General: He is not in acute distress.     Appearance: Normal appearance. He is normal weight.   HENT:      Head: Normocephalic and atraumatic.      Right Ear: Tympanic membrane, ear canal and external ear normal.      Left Ear: Tympanic membrane, ear canal and external ear normal.      Nose: Nose normal.      Mouth/Throat:      Mouth: Mucous membranes are moist.      Pharynx: Oropharynx is clear.   Eyes:      General: No scleral icterus.     Conjunctiva/sclera: Conjunctivae normal.      Pupils: Pupils are equal, round, and reactive to light.   Cardiovascular:      Rate and Rhythm: Normal rate and regular rhythm.      Pulses: Normal pulses.      Heart sounds: Normal heart sounds. No murmur heard.      Pulmonary:      Effort: Pulmonary effort is normal.      Breath sounds: Normal breath sounds. No wheezing, rhonchi or rales.   Musculoskeletal:      Cervical back: No rigidity or tenderness.   Lymphadenopathy:      Cervical: No cervical adenopathy.   Skin:     General: Skin is warm and dry.      Coloration: Skin is not jaundiced.      Findings: No rash.   Neurological:      General: No focal deficit present.      Mental Status: He is alert and oriented to person, place, and time.      Gait: Gait normal.   Psychiatric:         Mood and Affect:  Mood normal.         Thought Content: Thought content normal.         Judgment: Judgment normal.         Procedures    No visits with results within 30 Day(s) from this visit.   Latest known visit with results is:   Office Visit on 08/17/2021   Component Date Value Ref Range Status   • Color 08/17/2021 Yellow  Yellow, Straw, Dark Yellow, Claudia Final   • Clarity, UA 08/17/2021 Clear  Clear Final   • Specific Gravity  08/17/2021 1.025  1.005 - 1.030 Final   • pH, Urine 08/17/2021 7.5  5.0 - 8.0 Final   • Leukocytes 08/17/2021 Negative  Negative Final   • Nitrite, UA 08/17/2021 Negative  Negative Final   • Protein, POC 08/17/2021 Negative  Negative mg/dL Final   • Glucose, UA 08/17/2021 Negative  Negative, 1000 mg/dL (3+) mg/dL Final   • Ketones, UA 08/17/2021 Negative  Negative Final   • Urobilinogen, UA 08/17/2021 Normal  Normal Final   • Bilirubin 08/17/2021 Negative  Negative Final   • Blood, UA 08/17/2021 Negative  Negative Final       ASSESSMENT/ PLAN:    Diagnoses and all orders for this visit:    1. Attention deficit hyperactivity disorder (ADHD), predominantly inattentive type (Primary)  Assessment & Plan:  Is doing well on his current dose of Adderall.  We will continue it and monitor.      2. Benign prostatic hyperplasia with weak urinary stream  Assessment & Plan:  We will try an alternative alpha-blocker to see if that will help with his symptoms without any dizziness.      3. Essential hypertension  Assessment & Plan:  His blood pressure is good here today.  We will continue his current medication.      Other orders  -     doxazosin (Cardura) 2 MG tablet; Take 1 tablet by mouth Every Night for 90 days.  Dispense: 90 tablet; Refill: 1  -     sertraline (ZOLOFT) 100 MG tablet; Take 1 tablet by mouth Daily for 90 days.  Dispense: 90 tablet; Refill: 3  -     metoprolol succinate XL (TOPROL-XL) 50 MG 24 hr tablet; Take 1 tablet by mouth Daily for 90 days.  Dispense: 90 tablet; Refill: 3      Orders Placed  Today:     New Medications Ordered This Visit   Medications   • doxazosin (Cardura) 2 MG tablet     Sig: Take 1 tablet by mouth Every Night for 90 days.     Dispense:  90 tablet     Refill:  1   • sertraline (ZOLOFT) 100 MG tablet     Sig: Take 1 tablet by mouth Daily for 90 days.     Dispense:  90 tablet     Refill:  3   • metoprolol succinate XL (TOPROL-XL) 50 MG 24 hr tablet     Sig: Take 1 tablet by mouth Daily for 90 days.     Dispense:  90 tablet     Refill:  3        Management Plan:     An After Visit Summary was printed and given to the patient at discharge.    Follow-up: Return in about 3 months (around 2/19/2022) for Recheck.    Ed Myers,  11/19/2021 10:09 EST  This note was electronically signed.

## 2021-12-03 DIAGNOSIS — F90.0 ATTENTION DEFICIT HYPERACTIVITY DISORDER (ADHD), PREDOMINANTLY INATTENTIVE TYPE: ICD-10-CM

## 2021-12-03 NOTE — TELEPHONE ENCOUNTER
Caller: Shiva Varela    Relationship: Self    Best call back number: [phone]     Requested Prescriptions:   Requested Prescriptions     Pending Prescriptions Disp Refills   • amphetamine-dextroamphetamine XR (Adderall XR) 20 MG 24 hr capsule 30 capsule 0     Sig: Take 1 capsule by mouth Daily        Pharmacy where request should be sent: [facility]/PHARMACY #[id number] - [place], [place] - [address] [facility] [phone]  - [phone] FX     Additional details provided by patient: PATIENT ALSO STATED THAT HE WOULD ALSO LIKE TO TALK TO DR SAENZ ABOUT POTENTIALLY PRESCRIBING TAMSULOSIN AGAIN. HE THINKS HE MAY HAVE BEEN MISTAKEN WHEN HE THOUGHT IT WAS CAUSING DIZZINESS, AND WOULD LIKE TO DISCUSS WHETHER OR NOT HE CAN START TAKING IT AGAIN.     Does the patient have less than a 3 day supply:  [] Yes  [x] No    Demetria Saenz Rep   12/03/21 13:04 EST

## 2021-12-06 RX ORDER — DEXTROAMPHETAMINE SACCHARATE, AMPHETAMINE ASPARTATE MONOHYDRATE, DEXTROAMPHETAMINE SULFATE AND AMPHETAMINE SULFATE 5; 5; 5; 5 MG/1; MG/1; MG/1; MG/1
20 CAPSULE, EXTENDED RELEASE ORAL DAILY
Qty: 30 CAPSULE | Refills: 0 | Status: SHIPPED | OUTPATIENT
Start: 2021-12-06 | End: 2022-01-03 | Stop reason: SDUPTHER

## 2021-12-30 ENCOUNTER — TELEPHONE (OUTPATIENT)
Dept: FAMILY MEDICINE CLINIC | Facility: CLINIC | Age: 41
End: 2021-12-30

## 2021-12-30 DIAGNOSIS — B49 FUNGUS INFECTION: Primary | ICD-10-CM

## 2021-12-30 NOTE — TELEPHONE ENCOUNTER
Caller: Shiva Varela    Relationship to patient: Self    Best call back number: [phone]    Patient is needing: PATIENT WAS SEEN FOR A PLACE ON HIS FOOT BY DOCTOR DANY PATIENT BELIEVES ITS A WART HOWEVER DOCTOR DANY SAID IT LOOKED LIKE A FUNGUS AND TOLD PATIENT TO GET OVER THE COUNTER MEDICINE.     PATIENT STATED HE GOT AN ATHLETES FOOT MEDICATION BUT IT DOES NOT SEEM TO BE CLEARING UP, IT SEEMS TO BE COMING BACK. PATIENT IS UNSURE IF HE NEEDS TO SEE DOCTOR SAENZ ABOUT THIS AGAIN OR GET A REFERRAL TO PODIATRY.

## 2022-01-03 DIAGNOSIS — F90.0 ATTENTION DEFICIT HYPERACTIVITY DISORDER (ADHD), PREDOMINANTLY INATTENTIVE TYPE: ICD-10-CM

## 2022-01-03 RX ORDER — DEXTROAMPHETAMINE SACCHARATE, AMPHETAMINE ASPARTATE MONOHYDRATE, DEXTROAMPHETAMINE SULFATE AND AMPHETAMINE SULFATE 5; 5; 5; 5 MG/1; MG/1; MG/1; MG/1
20 CAPSULE, EXTENDED RELEASE ORAL DAILY
Qty: 30 CAPSULE | Refills: 0 | Status: SHIPPED | OUTPATIENT
Start: 2022-01-03 | End: 2022-02-02 | Stop reason: SDUPTHER

## 2022-01-03 NOTE — TELEPHONE ENCOUNTER
Caller: Shiva Varela    Relationship: Self    Best call back number: [phone]    Requested Prescriptions:   Requested Prescriptions     Pending Prescriptions Disp Refills   • amphetamine-dextroamphetamine XR (Adderall XR) 20 MG 24 hr capsule 30 capsule 0     Sig: Take 1 capsule by mouth Daily        Pharmacy where request should be sent: [facility]/PHARMACY #[id number] - [place] - [address] [facility] [phone]  - [phone] FX     Additional details provided by patient: PATIENT STATES THAT A NEW PRESCRIPTION IS NEEDED.     Does the patient have less than a 3 day supply:  [x] Yes  [] No    Demetria Mcintyre Rep   01/03/22 12:03 EST

## 2022-01-11 ENCOUNTER — OFFICE VISIT (OUTPATIENT)
Dept: PODIATRY | Facility: CLINIC | Age: 42
End: 2022-01-11

## 2022-01-11 VITALS
OXYGEN SATURATION: 100 % | DIASTOLIC BLOOD PRESSURE: 84 MMHG | BODY MASS INDEX: 31.53 KG/M2 | HEIGHT: 77 IN | HEART RATE: 82 BPM | WEIGHT: 267 LBS | SYSTOLIC BLOOD PRESSURE: 151 MMHG | TEMPERATURE: 97.3 F

## 2022-01-11 DIAGNOSIS — M79.671 FOOT PAIN, RIGHT: Primary | ICD-10-CM

## 2022-01-11 DIAGNOSIS — Q82.8 POROKERATOSIS: ICD-10-CM

## 2022-01-11 PROCEDURE — 99203 OFFICE O/P NEW LOW 30 MIN: CPT | Performed by: PODIATRIST

## 2022-01-11 NOTE — PROGRESS NOTES
[facility] - PODIATRY    Today's Date: 01/11/22    Patient Name: Shiva Varela  MRN: [id number]  CSN: [id number]  PCP: Ed Myers DO, Last PCP Visit: 19 November 2021  Referring Provider: Ed Myers*    SUBJECTIVE     Chief Complaint   Patient presents with   • Right Foot - Initial Evaluation, Itching, Pain     HPI: Shiva Varela, a 41 y.o.male, comes presents to clinic with chief complaint of:    New, Established, New Problem: New    Location:  hyperkeratotic lesion(s) on plantar right fourth metatarsal head area    Onset:  gradual    Nature:  sore    Stable, worsening, improving: Stable, no improvement    Aggravating factors:  Patient reports lesion(s) is painful with shoegear and ambulation.      Previous Treatment:   Debridement    Past Medical History:   Diagnosis Date   • ADHD    • Essential hypertension 04/30/2019   • Recurrent major depressive disorder, in full remission (HCC) 04/30/2019   • 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 IF it persists then EMG     Past Surgical History:   Procedure Laterality Date   • APPENDECTOMY     • HAND SURGERY     • HERNIA REPAIR     • KNEE ACL RECONSTRUCTION Left 2000   • KNEE MENISCECTOMY  2015    lateral   • WISDOM TOOTH EXTRACTION       Family History   Problem Relation Age of Onset   • Heart disease Paternal Grandmother    • Heart disease Paternal Grandfather    • Cancer Paternal Grandfather    • Diabetes Other      Social History     Socioeconomic History   • Marital status: Single   Tobacco Use   • Smoking status: Never Smoker   • Smokeless tobacco: Never Used   Vaping Use   • Vaping Use: Never used   Substance and Sexual Activity   • Alcohol use: Never   • Drug use: Never   • Sexual activity: Defer     Allergies   Allergen Reactions   • Bee Venom Unknown - Low Severity   • Codeine Unknown - Low Severity   • Latex Unknown - Low Severity      Persistent Contact      Current Outpatient Medications   Medication Sig Dispense Refill   • amphetamine-dextroamphetamine XR (Adderall XR) 20 MG 24 hr capsule Take 1 capsule by mouth Daily 30 capsule 0   • ergocalciferol (ERGOCALCIFEROL) 1.25 MG (74067 UT) capsule vitamin d 08022 unit caps     • metoprolol succinate XL (TOPROL-XL) 50 MG 24 hr tablet Take 1 tablet by mouth Daily for 90 days. 90 tablet 3   • sertraline (ZOLOFT) 100 MG tablet Take 1 tablet by mouth Daily for 90 days. 90 tablet 3   • doxazosin (Cardura) 2 MG tablet Take 1 tablet by mouth Every Night for 90 days. 90 tablet 1     No current facility-administered medications for this visit.     Review of Systems   Constitutional: Negative.    Skin:        Hyperkeratotic lesion on plantar right forefoot   All other systems reviewed and are negative.      OBJECTIVE     Vitals:    01/11/22 1411   BP: 151/84   Pulse: 82   Temp: 97.3 °F (36.3 °C)   SpO2: 100%       Body mass index is 31.66 kg/m².    Lab Results   Component Value Date    GLUCOSE 94 05/17/2021    CALCIUM 9.1 05/17/2021     05/17/2021    K 4.5 05/17/2021    CO2 28 05/17/2021     05/17/2021    BUN 14 05/17/2021    CREATININE 1.02 05/17/2021    BCR 14 05/17/2021    ANIONGAP 14 05/17/2021       Patient seen in no apparent distress.      PHYSICAL EXAM:     Foot/Ankle Exam:       General:   Appearance: appears stated age and healthy    Orientation: AAOx3    Affect: appropriate    Gait: unimpaired    Shoe Gear:  Casual shoes    VASCULAR      Right Foot Vascularity   Normal vascular exam    Dorsalis pedis:  2+  Posterior tibial:  2+  Skin Temperature: warm    Edema Grading:  None  CFT:  < 3 seconds  Pedal Hair Growth:  Present  Varicosities: none       Left Foot Vascularity   Normal vascular exam    Dorsalis pedis:  2+  Posterior tibial:  2+  Skin Temperature: warm    Edema Grading:  None  CFT:  < 3 seconds  Pedal Hair Growth:  Present  Varicosities: none        NEUROLOGIC     Right Foot  Neurologic   Normal sensation    Light touch sensation:  Normal  Vibratory sensation:  Normal  Hot/Cold sensation: normal    Protective Sensation using [place] Monofilament:  10     Left Foot Neurologic   Normal sensation    Light touch sensation:  Normal  Vibratory sensation:  Normal  Hot/cold sensation: normal    Protective Sensation using [place] Monofilament:  10     MUSCLE STRENGTH     Right Foot Muscle Strength   Normal strength    Foot dorsiflexion:  5  Foot plantar flexion:  5  Foot inversion:  5  Foot eversion:  5     Left Foot Muscle Strength   Foot dorsiflexion:  5  Foot plantar flexion:  5  Foot inversion:  5  Foot eversion:  5     RANGE OF MOTION      Right Foot Range of Motion   Foot and ankle ROM within normal limits       Left Foot Range of Motion   Foot and ankle ROM within normal limits       DERMATOLOGIC     Right Foot Dermatologic   Skin comment:  Focused hyperkeratotic lesion under plantar right fourth metatarsal head area.  Nails: normal       Left Foot Dermatologic   Skin: skin intact    Nails: normal        Right Foot Additional Comments Hyperkeratotic lesion on plantar right fourth metatarsal head area.  Partial thickness debridement with #15 scalpel blade down to health tissue.  No signs of edema, erythema, lymphangitis, nor signs of infection.            ASSESSMENT/PLAN     Diagnoses and all orders for this visit:    1. Foot pain, right (Primary)    2. Porokeratosis        Comprehensive lower extremity examination and evaluation was performed.    Discussed findings and treatment plan including risks, benefits, and treatment options with patient in detail. Patient agreed with treatment plan.    An After Visit Summary was printed and given to the patient at discharge, including (if requested) any available informative/educational handouts regarding diagnosis, treatment, or medications. All questions were answered to patient/family satisfaction. Should symptoms fail to  improve or worsen they agree to call or return to clinic or to go to the Emergency Department. Discussed the importance of following up with any needed screening tests/labs/specialist appointments and any requested follow-up recommended by me today. Importance of maintaining follow-up discussed and patient accepts that missed appointments can delay diagnosis and potentially lead to worsening of conditions.    Return if symptoms worsen or fail to improve., or sooner if acute issues arise.    This document has been electronically signed by Aldair Gutierrez DPM on January 11, 2022 14:47 EST

## 2022-02-02 DIAGNOSIS — F90.0 ATTENTION DEFICIT HYPERACTIVITY DISORDER (ADHD), PREDOMINANTLY INATTENTIVE TYPE: ICD-10-CM

## 2022-02-02 NOTE — TELEPHONE ENCOUNTER
Caller: Shiva Varela    Relationship: Self    Best call back number: [phone]     Requested Prescriptions:   Requested Prescriptions     Pending Prescriptions Disp Refills   • amphetamine-dextroamphetamine XR (Adderall XR) 20 MG 24 hr capsule 30 capsule 0     Sig: Take 1 capsule by mouth Daily        Pharmacy where request should be sent: [facility] [address] [place] [address] [phone] [place] [phone] FX     Additional details provided by patient:     Does the patient have less than a 3 day supply:  [x] Yes  [] No    Demetria SNLEL Rep   02/02/22 14:47 EST

## 2022-02-03 DIAGNOSIS — F90.0 ATTENTION DEFICIT HYPERACTIVITY DISORDER (ADHD), PREDOMINANTLY INATTENTIVE TYPE: ICD-10-CM

## 2022-02-03 RX ORDER — DEXTROAMPHETAMINE SACCHARATE, AMPHETAMINE ASPARTATE MONOHYDRATE, DEXTROAMPHETAMINE SULFATE AND AMPHETAMINE SULFATE 5; 5; 5; 5 MG/1; MG/1; MG/1; MG/1
20 CAPSULE, EXTENDED RELEASE ORAL DAILY
Qty: 30 CAPSULE | Refills: 0 | Status: SHIPPED | OUTPATIENT
Start: 2022-02-03 | End: 2022-02-18 | Stop reason: SDUPTHER

## 2022-02-03 RX ORDER — DEXTROAMPHETAMINE SACCHARATE, AMPHETAMINE ASPARTATE MONOHYDRATE, DEXTROAMPHETAMINE SULFATE AND AMPHETAMINE SULFATE 5; 5; 5; 5 MG/1; MG/1; MG/1; MG/1
20 CAPSULE, EXTENDED RELEASE ORAL DAILY
Qty: 30 CAPSULE | Refills: 0 | Status: SHIPPED | OUTPATIENT
Start: 2022-02-03 | End: 2022-02-03 | Stop reason: SDUPTHER

## 2022-02-03 NOTE — TELEPHONE ENCOUNTER
Caller: Shiva Varela    Relationship: Self    Best call back number: [phone]    Requested Prescriptions:   Requested Prescriptions     Pending Prescriptions Disp Refills   • amphetamine-dextroamphetamine XR (Adderall XR) 20 MG 24 hr capsule 30 capsule 0     Sig: Take 1 capsule by mouth Daily        Pharmacy where request should be sent: [facility] #[id number] [address] [phone]  - [phone] FX     Additional details provided by patient: 2 DAYS LEFT OF MEDICATION    [facility] WAS OUT OF MEDICATION    PATIENT WOULD LIKE MEDICATION TO BE SENT TO [facility]     Does the patient have less than a 3 day supply:  [x] Yes  [] No    Demetria Espinal Rep   02/03/22 10:29 EST

## 2022-02-18 ENCOUNTER — OFFICE VISIT (OUTPATIENT)
Dept: FAMILY MEDICINE CLINIC | Facility: CLINIC | Age: 42
End: 2022-02-18

## 2022-02-18 VITALS
DIASTOLIC BLOOD PRESSURE: 73 MMHG | HEIGHT: 77 IN | WEIGHT: 264.5 LBS | HEART RATE: 75 BPM | TEMPERATURE: 98.1 F | SYSTOLIC BLOOD PRESSURE: 119 MMHG | BODY MASS INDEX: 31.23 KG/M2 | OXYGEN SATURATION: 100 %

## 2022-02-18 DIAGNOSIS — I10 ESSENTIAL HYPERTENSION: Primary | ICD-10-CM

## 2022-02-18 DIAGNOSIS — F90.0 ATTENTION DEFICIT HYPERACTIVITY DISORDER (ADHD), PREDOMINANTLY INATTENTIVE TYPE: ICD-10-CM

## 2022-02-18 PROBLEM — L30.9 ECZEMA: Status: ACTIVE | Noted: 2022-02-18

## 2022-02-18 PROBLEM — F32.A DEPRESSIVE DISORDER: Status: ACTIVE | Noted: 2022-02-18

## 2022-02-18 PROBLEM — E78.2 MIXED HYPERLIPIDEMIA: Status: ACTIVE | Noted: 2018-10-19

## 2022-02-18 PROBLEM — G56.22 ULNAR NEUROPATHY AT ELBOW OF LEFT UPPER EXTREMITY: Status: ACTIVE | Noted: 2020-09-10

## 2022-02-18 PROBLEM — E55.9 VITAMIN D DEFICIENCY: Status: ACTIVE | Noted: 2022-02-18

## 2022-02-18 PROBLEM — M43.17 SPONDYLOLISTHESIS AT L5-S1 LEVEL: Status: ACTIVE | Noted: 2017-11-01

## 2022-02-18 PROBLEM — K46.9 ABDOMINAL HERNIA: Status: ACTIVE | Noted: 2022-02-18

## 2022-02-18 PROCEDURE — 99213 OFFICE O/P EST LOW 20 MIN: CPT | Performed by: FAMILY MEDICINE

## 2022-02-18 RX ORDER — SERTRALINE HYDROCHLORIDE 100 MG/1
100 TABLET, FILM COATED ORAL DAILY
Qty: 90 TABLET | Refills: 3 | Status: SHIPPED | OUTPATIENT
Start: 2022-02-18 | End: 2022-12-15

## 2022-02-18 RX ORDER — METOPROLOL SUCCINATE 50 MG/1
50 TABLET, EXTENDED RELEASE ORAL DAILY
Qty: 90 TABLET | Refills: 3 | Status: SHIPPED | OUTPATIENT
Start: 2022-02-18 | End: 2022-10-17 | Stop reason: SDUPTHER

## 2022-02-18 RX ORDER — DEXTROAMPHETAMINE SACCHARATE, AMPHETAMINE ASPARTATE MONOHYDRATE, DEXTROAMPHETAMINE SULFATE AND AMPHETAMINE SULFATE 5; 5; 5; 5 MG/1; MG/1; MG/1; MG/1
20 CAPSULE, EXTENDED RELEASE ORAL DAILY
Qty: 30 CAPSULE | Refills: 0 | Status: SHIPPED | OUTPATIENT
Start: 2022-02-18 | End: 2022-03-24 | Stop reason: SDUPTHER

## 2022-02-18 NOTE — PROGRESS NOTES
Chief Complaint   Patient presents with   • Follow-up     3 month follow up        Subjective     Shiva Varela  has a past medical history of Callus, Essential hypertension (04/30/2019), Recurrent major depressive disorder, in full remission (HCC) (04/30/2019), and Ulnar neuropathy at elbow of left upper extremity (09/10/2020).    ADD-he states that he is doing well.  His focus attention and concentration are good.  He is able to stay on task with any difficulties.  He is no loss of appetite and no unexplained weight loss.    Hypertension-his blood pressure is great here today at 119/73.      PHQ-2 Depression Screening  Little interest or pleasure in doing things? 1   Feeling down, depressed, or hopeless? 0   PHQ-2 Total Score 1   PHQ-9 Depression Screening  Little interest or pleasure in doing things? 1   Feeling down, depressed, or hopeless? 0   Trouble falling or staying asleep, or sleeping too much?     Feeling tired or having little energy?     Poor appetite or overeating?     Feeling bad about yourself - or that you are a failure or have let yourself or your family down?     Trouble concentrating on things, such as reading the newspaper or watching television?     Moving or speaking so slowly that other people could have noticed? Or the opposite - being so fidgety or restless that you have been moving around a lot more than usual?     Thoughts that you would be better off dead, or of hurting yourself in some way?     PHQ-9 Total Score 1   If you checked off any problems, how difficult have these problems made it for you to do your work, take care of things at home, or get along with other people?       Allergies   Allergen Reactions   • Bee Venom Unknown - Low Severity   • Codeine Unknown - Low Severity   • Latex Unknown - Low Severity     Persistent Contact        Prior to Admission medications    Medication Sig Start Date End Date Taking? Authorizing Provider   amphetamine-dextroamphetamine XR (Adderall  XR) 20 MG 24 hr capsule Take 1 capsule by mouth Daily 2/3/22  Yes Ed Myers,    ergocalciferol (ERGOCALCIFEROL) 1.25 MG (44889 UT) capsule vitamin d 22417 unit caps   Yes Provider, MD Ramona   doxazosin (Cardura) 2 MG tablet Take 1 tablet by mouth Every Night for 90 days. 11/19/21 2/17/22  Ed Myers DO   metoprolol succinate XL (TOPROL-XL) 50 MG 24 hr tablet Take 1 tablet by mouth Daily for 90 days. 11/19/21 2/17/22  Ed Myers DO   sertraline (ZOLOFT) 100 MG tablet Take 1 tablet by mouth Daily for 90 days. 11/19/21 2/17/22  Ed Myers DO        Patient Active Problem List   Diagnosis   • Attention deficit hyperactivity disorder (ADHD)   • Essential hypertension   • Major depressive disorder, recurrent episode, in full remission (HCC)   • Difficulty urinating   • Benign prostatic hyperplasia with weak urinary stream   • Spondylolisthesis at L5-S1 level   • Abdominal hernia   • Depressive disorder   • Eczema   • Mixed hyperlipidemia   • Ulnar neuropathy at elbow of left upper extremity   • Vitamin D deficiency        Past Surgical History:   Procedure Laterality Date   • APPENDECTOMY     • HAND SURGERY     • HERNIA REPAIR     • KNEE ACL RECONSTRUCTION Left 2000   • KNEE MENISCECTOMY  2015    lateral   • WISDOM TOOTH EXTRACTION         Social History     Socioeconomic History   • Marital status: Single   Tobacco Use   • Smoking status: Never Smoker   • Smokeless tobacco: Never Used   Vaping Use   • Vaping Use: Never used   Substance and Sexual Activity   • Alcohol use: Never   • Drug use: Never   • Sexual activity: Defer       Family History   Problem Relation Age of Onset   • Heart disease Paternal Grandmother    • Heart disease Paternal Grandfather    • Cancer Paternal Grandfather    • Diabetes Other        Family history, surgical history, past medical history, Allergies and med's reviewed with patient today and updated in [facility] EMR.     ROS:  Review of  "Systems   Constitutional: Negative for appetite change, fatigue and unexpected weight loss.   Respiratory: Negative for cough, chest tightness, shortness of breath and wheezing.    Cardiovascular: Negative for palpitations and leg swelling.   Neurological: Negative for headache.   Psychiatric/Behavioral: Negative for decreased concentration.       OBJECTIVE:  Vitals:    02/18/22 1031   BP: 119/73   BP Location: Left arm   Patient Position: Sitting   Cuff Size: Adult   Pulse: 75   Temp: 98.1 °F (36.7 °C)   TempSrc: Temporal   SpO2: 100%   Weight: 120 kg (264 lb 8 oz)   Height: 195.6 cm (77.01\")     No exam data present   Body mass index is 31.36 kg/m².  No LMP for male patient.    Physical Exam  Vitals and nursing note reviewed.   Constitutional:       General: He is not in acute distress.     Appearance: Normal appearance. He is normal weight.   HENT:      Head: Normocephalic.   Cardiovascular:      Rate and Rhythm: Normal rate and regular rhythm.      Heart sounds: Normal heart sounds. No murmur heard.      Pulmonary:      Effort: Pulmonary effort is normal.      Breath sounds: Normal breath sounds. No wheezing, rhonchi or rales.   Neurological:      Mental Status: He is alert.   Psychiatric:         Mood and Affect: Mood normal.         Behavior: Behavior normal.         Thought Content: Thought content normal.         Judgment: Judgment normal.         Procedures    No visits with results within 30 Day(s) from this visit.   Latest known visit with results is:   Office Visit on 08/17/2021   Component Date Value Ref Range Status   • Color 08/17/2021 Yellow  Yellow, Straw, Dark Yellow, Claudia Final   • Clarity, UA 08/17/2021 Clear  Clear Final   • Specific Gravity  08/17/2021 1.025  1.005 - 1.030 Final   • pH, Urine 08/17/2021 7.5  5.0 - 8.0 Final   • Leukocytes 08/17/2021 Negative  Negative Final   • Nitrite, UA 08/17/2021 Negative  Negative Final   • Protein, POC 08/17/2021 Negative  Negative mg/dL Final   • " Glucose, UA 08/17/2021 Negative  Negative, 1000 mg/dL (3+) mg/dL Final   • Ketones, UA 08/17/2021 Negative  Negative Final   • Urobilinogen, UA 08/17/2021 Normal  Normal Final   • Bilirubin 08/17/2021 Negative  Negative Final   • Blood, UA 08/17/2021 Negative  Negative Final       ASSESSMENT/ PLAN:    Diagnoses and all orders for this visit:    1. Essential hypertension (Primary)  Assessment & Plan:  His blood pressure is good here today we will continue his current meds.      2. Attention deficit hyperactivity disorder (ADHD), predominantly inattentive type  Assessment & Plan:  He is doing well with his current medication and dosage.  We will continue it as is.    Orders:  -     amphetamine-dextroamphetamine XR (Adderall XR) 20 MG 24 hr capsule; Take 1 capsule by mouth Daily for 90 days  Dispense: 30 capsule; Refill: 0    Other orders  -     sertraline (ZOLOFT) 100 MG tablet; Take 1 tablet by mouth Daily for 90 days.  Dispense: 90 tablet; Refill: 3  -     metoprolol succinate XL (TOPROL-XL) 50 MG 24 hr tablet; Take 1 tablet by mouth Daily for 90 days.  Dispense: 90 tablet; Refill: 3      Orders Placed Today:     New Medications Ordered This Visit   Medications   • amphetamine-dextroamphetamine XR (Adderall XR) 20 MG 24 hr capsule     Sig: Take 1 capsule by mouth Daily for 90 days     Dispense:  30 capsule     Refill:  0   • sertraline (ZOLOFT) 100 MG tablet     Sig: Take 1 tablet by mouth Daily for 90 days.     Dispense:  90 tablet     Refill:  3   • metoprolol succinate XL (TOPROL-XL) 50 MG 24 hr tablet     Sig: Take 1 tablet by mouth Daily for 90 days.     Dispense:  90 tablet     Refill:  3        Management Plan:     An After Visit Summary was printed and given to the patient at discharge.    Follow-up: Return in about 3 months (around 5/18/2022) for Recheck.    Ed Myers DO 2/18/2022 11:15 EST  This note was electronically signed.

## 2022-03-24 ENCOUNTER — TELEPHONE (OUTPATIENT)
Dept: FAMILY MEDICINE CLINIC | Facility: CLINIC | Age: 42
End: 2022-03-24

## 2022-03-24 DIAGNOSIS — F90.0 ATTENTION DEFICIT HYPERACTIVITY DISORDER (ADHD), PREDOMINANTLY INATTENTIVE TYPE: ICD-10-CM

## 2022-03-24 RX ORDER — DEXTROAMPHETAMINE SACCHARATE, AMPHETAMINE ASPARTATE MONOHYDRATE, DEXTROAMPHETAMINE SULFATE AND AMPHETAMINE SULFATE 5; 5; 5; 5 MG/1; MG/1; MG/1; MG/1
20 CAPSULE, EXTENDED RELEASE ORAL DAILY
Qty: 90 CAPSULE | Refills: 0 | Status: SHIPPED | OUTPATIENT
Start: 2022-03-24 | End: 2022-06-17 | Stop reason: SDUPTHER

## 2022-03-24 NOTE — TELEPHONE ENCOUNTER
Caller: KATHERINE Guzman call back number: [phone]    Requested Prescriptions:   amphetamine-dextroamphetamine XR (Adderall XR) 20 MG 24 hr capsule     Ph[facility] where request should be sent[facility] [place] [place] - [address] AT [facility]AL TO [facility] - [phone]  - [phone]      Additional details provided by patient: PATIENT WOULD LIKE TO KNOW IF HE CAN HAVE A 90 DAY SUPPLY, BUT IF NOT THEN PLEASE SEND IN THE 30 DAY SUPPLY.    Does the patient have less than a 3 day supply:  [] Yes  [x] No    SHAGGY VASQUEZ, Demetria Rep   03/24/22 08:44 EDT

## 2022-05-24 ENCOUNTER — OFFICE VISIT (OUTPATIENT)
Dept: FAMILY MEDICINE CLINIC | Facility: CLINIC | Age: 42
End: 2022-05-24

## 2022-05-24 VITALS
SYSTOLIC BLOOD PRESSURE: 125 MMHG | WEIGHT: 263.8 LBS | OXYGEN SATURATION: 99 % | BODY MASS INDEX: 31.15 KG/M2 | HEIGHT: 77 IN | HEART RATE: 84 BPM | DIASTOLIC BLOOD PRESSURE: 74 MMHG | TEMPERATURE: 98.6 F

## 2022-05-24 DIAGNOSIS — E78.2 MIXED HYPERLIPIDEMIA: ICD-10-CM

## 2022-05-24 DIAGNOSIS — F90.0 ATTENTION DEFICIT HYPERACTIVITY DISORDER (ADHD), PREDOMINANTLY INATTENTIVE TYPE: ICD-10-CM

## 2022-05-24 DIAGNOSIS — I10 ESSENTIAL HYPERTENSION: Primary | ICD-10-CM

## 2022-05-24 LAB
ALBUMIN SERPL-MCNC: 4.2 G/DL (ref 3.5–5.2)
ALBUMIN/GLOB SERPL: 1.8 G/DL
ALP SERPL-CCNC: 53 U/L (ref 39–117)
ALT SERPL W P-5'-P-CCNC: 21 U/L (ref 1–41)
AMPHET+METHAMPHET UR QL: POSITIVE
AMPHETAMINE INTERNAL CONTROL: ABNORMAL
AMPHETAMINES UR QL: NEGATIVE
ANION GAP SERPL CALCULATED.3IONS-SCNC: 9.3 MMOL/L (ref 5–15)
AST SERPL-CCNC: 20 U/L (ref 1–40)
BARBITURATE INTERNAL CONTROL: ABNORMAL
BARBITURATES UR QL SCN: NEGATIVE
BENZODIAZ UR QL SCN: NEGATIVE
BENZODIAZEPINE INTERNAL CONTROL: ABNORMAL
BILIRUB SERPL-MCNC: 0.4 MG/DL (ref 0–1.2)
BUN SERPL-MCNC: 14 MG/DL (ref 6–20)
BUN/CREAT SERPL: 13.6 (ref 7–25)
BUPRENORPHINE INTERNAL CONTROL: ABNORMAL
BUPRENORPHINE SERPL-MCNC: NEGATIVE NG/ML
CALCIUM SPEC-SCNC: 9.1 MG/DL (ref 8.6–10.5)
CANNABINOIDS SERPL QL: NEGATIVE
CHLORIDE SERPL-SCNC: 104 MMOL/L (ref 98–107)
CHOLEST SERPL-MCNC: 149 MG/DL (ref 0–200)
CO2 SERPL-SCNC: 27.7 MMOL/L (ref 22–29)
COCAINE INTERNAL CONTROL: ABNORMAL
COCAINE UR QL: NEGATIVE
CREAT SERPL-MCNC: 1.03 MG/DL (ref 0.76–1.27)
EGFRCR SERPLBLD CKD-EPI 2021: 94.8 ML/MIN/1.73
EXPIRATION DATE: ABNORMAL
GLOBULIN UR ELPH-MCNC: 2.3 GM/DL
GLUCOSE SERPL-MCNC: 87 MG/DL (ref 65–99)
HDLC SERPL-MCNC: 42 MG/DL (ref 40–60)
LDLC SERPL CALC-MCNC: 88 MG/DL (ref 0–100)
LDLC/HDLC SERPL: 2.07 {RATIO}
Lab: ABNORMAL
MDMA (ECSTASY) INTERNAL CONTROL: ABNORMAL
MDMA UR QL SCN: NEGATIVE
METHADONE INTERNAL CONTROL: ABNORMAL
METHADONE UR QL SCN: NEGATIVE
METHAMPHETAMINE INTERNAL CONTROL: ABNORMAL
OPIATES INTERNAL CONTROL: ABNORMAL
OPIATES UR QL: NEGATIVE
OXYCODONE INTERNAL CONTROL: ABNORMAL
OXYCODONE UR QL SCN: NEGATIVE
PCP UR QL SCN: NEGATIVE
PHENCYCLIDINE INTERNAL CONTROL: ABNORMAL
POTASSIUM SERPL-SCNC: 3.7 MMOL/L (ref 3.5–5.2)
PROT SERPL-MCNC: 6.5 G/DL (ref 6–8.5)
SODIUM SERPL-SCNC: 141 MMOL/L (ref 136–145)
THC INTERNAL CONTROL: ABNORMAL
TRIGL SERPL-MCNC: 100 MG/DL (ref 0–150)
VLDLC SERPL-MCNC: 19 MG/DL (ref 5–40)

## 2022-05-24 PROCEDURE — 80305 DRUG TEST PRSMV DIR OPT OBS: CPT | Performed by: FAMILY MEDICINE

## 2022-05-24 PROCEDURE — 80053 COMPREHEN METABOLIC PANEL: CPT | Performed by: FAMILY MEDICINE

## 2022-05-24 PROCEDURE — 99214 OFFICE O/P EST MOD 30 MIN: CPT | Performed by: FAMILY MEDICINE

## 2022-05-24 PROCEDURE — 80061 LIPID PANEL: CPT | Performed by: FAMILY MEDICINE

## 2022-05-24 RX ORDER — FLUTICASONE PROPIONATE 50 MCG
2 SPRAY, SUSPENSION (ML) NASAL DAILY
COMMUNITY
End: 2022-09-13

## 2022-05-24 NOTE — PROGRESS NOTES
Chief Complaint   Patient presents with   • Follow-up     3 month    • Hyperlipidemia   • Hypertension        Subjective     Shiva Varela  has a past medical history of Callus, Essential hypertension (04/30/2019), and Ulnar neuropathy at elbow of left upper extremity (09/10/2020).    Hypertension- he has not been checking his blood pressure outside the office.  His blood pressure is good here today at 125/74.    Hyperlipidemia- he is currently not on anything for his cholesterols at this time.    ADD- he is currently doing well with his ADD.  His attention focus and concentration is being maintained.  He is taking his medication daily.      PHQ-2 Depression Screening  Little interest or pleasure in doing things?     Feeling down, depressed, or hopeless?     PHQ-2 Total Score     PHQ-9 Depression Screening  Little interest or pleasure in doing things?     Feeling down, depressed, or hopeless?     Trouble falling or staying asleep, or sleeping too much?     Feeling tired or having little energy?     Poor appetite or overeating?     Feeling bad about yourself - or that you are a failure or have let yourself or your family down?     Trouble concentrating on things, such as reading the newspaper or watching television?     Moving or speaking so slowly that other people could have noticed? Or the opposite - being so fidgety or restless that you have been moving around a lot more than usual?     Thoughts that you would be better off dead, or of hurting yourself in some way?     PHQ-9 Total Score     If you checked off any problems, how difficult have these problems made it for you to do your work, take care of things at home, or get along with other people?       Allergies   Allergen Reactions   • Bee Venom Unknown - Low Severity   • Codeine Unknown - Low Severity   • Latex Unknown - Low Severity     Persistent Contact        Prior to Admission medications    Medication Sig Start Date End Date Taking? Authorizing  Provider   amphetamine-dextroamphetamine XR (Adderall XR) 20 MG 24 hr capsule Take 1 capsule by mouth Daily for 90 days 3/24/22 6/22/22 Yes Ed Myers DO   ergocalciferol (ERGOCALCIFEROL) 1.25 MG (66404 UT) capsule vitamin d 28474 unit caps   Yes Ramona Coles MD   fluticasone (FLONASE) 50 MCG/ACT nasal spray 2 sprays into the nostril(s) as directed by provider Daily.   Yes Ramona Coles MD   metoprolol succinate XL (TOPROL-XL) 50 MG 24 hr tablet Take 1 tablet by mouth Daily for 90 days. 2/18/22 5/19/22 Yes Ed Myers DO   sertraline (ZOLOFT) 100 MG tablet Take 1 tablet by mouth Daily for 90 days. 2/18/22 5/19/22 Yes Ed Myers DO        Patient Active Problem List   Diagnosis   • Attention deficit hyperactivity disorder (ADHD)   • Essential hypertension   • Major depressive disorder, recurrent episode, in full remission (HCC)   • Difficulty urinating   • Benign prostatic hyperplasia with weak urinary stream   • Spondylolisthesis at L5-S1 level   • Abdominal hernia   • Depressive disorder   • Eczema   • Mixed hyperlipidemia   • Ulnar neuropathy at elbow of left upper extremity   • Vitamin D deficiency        Past Surgical History:   Procedure Laterality Date   • APPENDECTOMY     • HAND SURGERY     • HERNIA REPAIR     • KNEE ACL RECONSTRUCTION Left 2000   • KNEE MENISCECTOMY  2015    lateral   • WISDOM TOOTH EXTRACTION         Social History     Socioeconomic History   • Marital status: Single   Tobacco Use   • Smoking status: Never Smoker   • Smokeless tobacco: Never Used   Vaping Use   • Vaping Use: Never used   Substance and Sexual Activity   • Alcohol use: Never   • Drug use: Never   • Sexual activity: Defer       Family History   Problem Relation Age of Onset   • Heart disease Paternal Grandmother    • Heart disease Paternal Grandfather    • Cancer Paternal Grandfather    • Diabetes Other        Family history, surgical history, past medical history,  "Allergies and med's reviewed with patient today and updated in [facility] EMR.     ROS:  Review of Systems   Constitutional: Negative for fatigue.   HENT: Positive for congestion. Negative for postnasal drip and rhinorrhea.    Eyes: Negative for blurred vision and visual disturbance.   Respiratory: Negative for cough, chest tightness, shortness of breath and wheezing.    Cardiovascular: Negative for chest pain and palpitations.   Allergic/Immunologic: Positive for environmental allergies.   Neurological: Negative for headache.   Psychiatric/Behavioral: Negative for decreased concentration and depressed mood. The patient is not nervous/anxious.        OBJECTIVE:  Vitals:    05/24/22 0807   BP: 125/74   BP Location: Right arm   Patient Position: Sitting   Pulse: 84   Temp: 98.6 °F (37 °C)   SpO2: 99%   Weight: 120 kg (263 lb 12.8 oz)   Height: 195.6 cm (77.01\")     No exam data present   Body mass index is 31.27 kg/m².  No LMP for male patient.    Physical Exam  Vitals and nursing note reviewed.   Constitutional:       General: He is not in acute distress.     Appearance: Normal appearance. He is normal weight.   HENT:      Head: Normocephalic.      Right Ear: Tympanic membrane, ear canal and external ear normal.      Left Ear: Tympanic membrane, ear canal and external ear normal.      Nose: Nose normal.      Mouth/Throat:      Mouth: Mucous membranes are moist.      Pharynx: Oropharynx is clear.   Eyes:      General: No scleral icterus.     Conjunctiva/sclera: Conjunctivae normal.      Pupils: Pupils are equal, round, and reactive to light.   Cardiovascular:      Rate and Rhythm: Normal rate and regular rhythm.      Pulses: Normal pulses.      Heart sounds: Normal heart sounds. No murmur heard.  Pulmonary:      Effort: Pulmonary effort is normal.      Breath sounds: Normal breath sounds. No wheezing, rhonchi or rales.   Musculoskeletal:      Cervical back: Neck supple. No rigidity or tenderness.   Lymphadenopathy:      " Cervical: No cervical adenopathy.   Skin:     General: Skin is warm and dry.      Coloration: Skin is not jaundiced.      Findings: No rash.   Neurological:      General: No focal deficit present.      Mental Status: He is alert and oriented to person, place, and time.   Psychiatric:         Mood and Affect: Mood normal.         Thought Content: Thought content normal.         Judgment: Judgment normal.         Procedures    No visits with results within 30 Day(s) from this visit.   Latest known visit with results is:   Office Visit on 08/17/2021   Component Date Value Ref Range Status   • Color 08/17/2021 Yellow  Yellow, Straw, Dark Yellow, Claudia Final   • Clarity, UA 08/17/2021 Clear  Clear Final   • Specific Gravity  08/17/2021 1.025  1.005 - 1.030 Final   • pH, Urine 08/17/2021 7.5  5.0 - 8.0 Final   • Leukocytes 08/17/2021 Negative  Negative Final   • Nitrite, UA 08/17/2021 Negative  Negative Final   • Protein, POC 08/17/2021 Negative  Negative mg/dL Final   • Glucose, UA 08/17/2021 Negative  Negative, 1000 mg/dL (3+) mg/dL Final   • Ketones, UA 08/17/2021 Negative  Negative Final   • Urobilinogen, UA 08/17/2021 Normal  Normal Final   • Bilirubin 08/17/2021 Negative  Negative Final   • Blood, UA 08/17/2021 Negative  Negative Final       ASSESSMENT/ PLAN:    Diagnoses and all orders for this visit:    1. Essential hypertension (Primary)  Assessment & Plan:  His blood pressure is good these days.  We will not make any change in his medication and update his labs.    Orders:  -     Comprehensive Metabolic Panel  -     Lipid Panel    2. Mixed hyperlipidemia  Assessment & Plan:  Update his lipid profile with today's labs.    Orders:  -     Comprehensive Metabolic Panel  -     Lipid Panel    3. Attention deficit hyperactivity disorder (ADHD), predominantly inattentive type  Assessment & Plan:  Is currently doing well with his current medication and dosage.  He is due for an updated UDS.    Orders:  -     POC Urine  Drug Screen Premier Bio-Cup      Orders Placed Today:     No orders of the defined types were placed in this encounter.       Management Plan:     An After Visit Summary was printed and given to the patient at discharge.    Follow-up: Return in about 3 months (around 8/24/2022) for Recheck.    Ed Myers,  5/24/2022 08:20 EDT  This note was electronically signed.

## 2022-05-24 NOTE — ASSESSMENT & PLAN NOTE
His blood pressure is good these days.  We will not make any change in his medication and update his labs.

## 2022-06-17 DIAGNOSIS — F90.0 ATTENTION DEFICIT HYPERACTIVITY DISORDER (ADHD), PREDOMINANTLY INATTENTIVE TYPE: ICD-10-CM

## 2022-06-17 RX ORDER — DEXTROAMPHETAMINE SACCHARATE, AMPHETAMINE ASPARTATE MONOHYDRATE, DEXTROAMPHETAMINE SULFATE AND AMPHETAMINE SULFATE 5; 5; 5; 5 MG/1; MG/1; MG/1; MG/1
20 CAPSULE, EXTENDED RELEASE ORAL DAILY
Qty: 90 CAPSULE | Refills: 0 | Status: SHIPPED | OUTPATIENT
Start: 2022-06-17 | End: 2022-09-23 | Stop reason: SDUPTHER

## 2022-06-17 NOTE — TELEPHONE ENCOUNTER
Caller: Shiva Varela    Relationship: Self    Best call back number: [phone]     Requested Prescriptions:   Requested Prescriptions     Pending Prescriptions Disp Refills   • amphetamine-dextroamphetamine XR (Adderall XR) 20 MG 24 hr capsule 90 capsule 0     Sig: Take 1 capsule by mouth Daily for 90 days        Pharmacy where request should be sent: [facility] - [place], [place] - [address] AT PORTAL TO [facility] - [phone] [facility] [phone]      Additional details provided by patient: 3 MONTH SUPPLY REQUESTED    Does the patient have less than a 3 day supply:  [x] Yes  [] No    Demetria White Rep   06/17/22 08:34 EDT

## 2022-07-28 ENCOUNTER — OFFICE VISIT (OUTPATIENT)
Dept: FAMILY MEDICINE CLINIC | Facility: CLINIC | Age: 42
End: 2022-07-28

## 2022-07-28 VITALS
OXYGEN SATURATION: 100 % | HEART RATE: 73 BPM | SYSTOLIC BLOOD PRESSURE: 128 MMHG | HEIGHT: 77 IN | WEIGHT: 265.4 LBS | TEMPERATURE: 97.3 F | DIASTOLIC BLOOD PRESSURE: 84 MMHG | BODY MASS INDEX: 31.34 KG/M2
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
## 2022-07-28 DIAGNOSIS — F90.0 ATTENTION DEFICIT HYPERACTIVITY DISORDER (ADHD), PREDOMINANTLY INATTENTIVE TYPE: Primary | ICD-10-CM

## 2022-07-28 DIAGNOSIS — Z00.00 ANNUAL PHYSICAL EXAM: ICD-10-CM

## 2022-07-28 DIAGNOSIS — M43.17 SPONDYLOLISTHESIS AT L5-S1 LEVEL: ICD-10-CM

## 2022-07-28 DIAGNOSIS — S90.561A INSECT BITE OF RIGHT ANKLE, INITIAL ENCOUNTER: ICD-10-CM

## 2022-07-28 LAB
ALBUMIN SERPL-MCNC: 4.3 G/DL (ref 3.5–5.2)
ALBUMIN/GLOB SERPL: 2 G/DL
ALP SERPL-CCNC: 52 U/L (ref 39–117)
ALT SERPL W P-5'-P-CCNC: 19 U/L (ref 1–41)
ANION GAP SERPL CALCULATED.3IONS-SCNC: 7.8 MMOL/L (ref 5–15)
AST SERPL-CCNC: 20 U/L (ref 1–40)
BILIRUB SERPL-MCNC: 0.5 MG/DL (ref 0–1.2)
BUN SERPL-MCNC: 14 MG/DL (ref 6–20)
BUN/CREAT SERPL: 13.6 (ref 7–25)
CALCIUM SPEC-SCNC: 9.2 MG/DL (ref 8.6–10.5)
CHLORIDE SERPL-SCNC: 102 MMOL/L (ref 98–107)
CHOLEST SERPL-MCNC: 161 MG/DL (ref 0–200)
CO2 SERPL-SCNC: 29.2 MMOL/L (ref 22–29)
CREAT SERPL-MCNC: 1.03 MG/DL (ref 0.76–1.27)
EGFRCR SERPLBLD CKD-EPI 2021: 94.8 ML/MIN/1.73
GLOBULIN UR ELPH-MCNC: 2.2 GM/DL
GLUCOSE SERPL-MCNC: 90 MG/DL (ref 65–99)
HBA1C MFR BLD: 5.4 % (ref 4.8–5.6)
HDLC SERPL-MCNC: 45 MG/DL (ref 40–60)
LDLC SERPL CALC-MCNC: 101 MG/DL (ref 0–100)
LDLC/HDLC SERPL: 2.24 {RATIO}
POTASSIUM SERPL-SCNC: 4.2 MMOL/L (ref 3.5–5.2)
PROT SERPL-MCNC: 6.5 G/DL (ref 6–8.5)
SODIUM SERPL-SCNC: 139 MMOL/L (ref 136–145)
TRIGL SERPL-MCNC: 77 MG/DL (ref 0–150)
VLDLC SERPL-MCNC: 15 MG/DL (ref 5–40)

## 2022-07-28 PROCEDURE — 36415 COLL VENOUS BLD VENIPUNCTURE: CPT | Performed by: FAMILY MEDICINE

## 2022-07-28 PROCEDURE — 80061 LIPID PANEL: CPT | Performed by: FAMILY MEDICINE

## 2022-07-28 PROCEDURE — 99395 PREV VISIT EST AGE 18-39: CPT | Performed by: FAMILY MEDICINE

## 2022-07-28 PROCEDURE — 80053 COMPREHEN METABOLIC PANEL: CPT | Performed by: FAMILY MEDICINE

## 2022-07-28 PROCEDURE — 83036 HEMOGLOBIN GLYCOSYLATED A1C: CPT | Performed by: FAMILY MEDICINE

## 2022-07-28 NOTE — ASSESSMENT & PLAN NOTE
He only has some intermittent itching.  He may apply just some hydrocortisone cream and watch for any further changes of this lesion.

## 2022-07-28 NOTE — PROGRESS NOTES
Chief Complaint   Patient presents with   • Annual Exam     Patient here for a physical for work. He has been fasting for labs   • Insect Bite     Patient has a spot on his right lower leg. States he thinks he got bit by a bug on Sunday. It was swollen, now is red in color since yesterday   • Back Pain     Lower mid back pain        Subjective     Shiva Varela  has a past medical history of Callus, Essential hypertension (04/30/2019), and Ulnar neuropathy at elbow of left upper extremity (09/10/2020).    Annual Exam-overall he states he is doing well.  He has started a new job and require an annual exam with biometric screening.    Insect bite- he states he noticed an area on his right lower leg about 4 days ago.  Presumes it was an insect bite.  It is remained just a little red.  It is somewhat itchy intermittently.    Back pain- he has a history of some intermittent back pain.  He does some back exercises and some ibuprofen on an as-needed basis.  For the most part these do help most of the time.    ADD-he states he is doing well with his medication at this time.  His focus attention and concentration is maintained.  He takes his medication on a daily basis.      PHQ-2 Depression Screening  Little interest or pleasure in doing things?     Feeling down, depressed, or hopeless?     PHQ-2 Total Score     PHQ-9 Depression Screening  Little interest or pleasure in doing things?     Feeling down, depressed, or hopeless?     Trouble falling or staying asleep, or sleeping too much?     Feeling tired or having little energy?     Poor appetite or overeating?     Feeling bad about yourself - or that you are a failure or have let yourself or your family down?     Trouble concentrating on things, such as reading the newspaper or watching television?     Moving or speaking so slowly that other people could have noticed? Or the opposite - being so fidgety or restless that you have been moving around a lot more than usual?      Thoughts that you would be better off dead, or of hurting yourself in some way?     PHQ-9 Total Score     If you checked off any problems, how difficult have these problems made it for you to do your work, take care of things at home, or get along with other people?       Allergies   Allergen Reactions   • Bee Venom Unknown - Low Severity   • Codeine Unknown - Low Severity   • Latex Unknown - Low Severity     Persistent Contact        Prior to Admission medications    Medication Sig Start Date End Date Taking? Authorizing Provider   amphetamine-dextroamphetamine XR (Adderall XR) 20 MG 24 hr capsule Take 1 capsule by mouth Daily for 90 days 6/17/22 9/15/22 Yes Ed Myers DO   ergocalciferol (ERGOCALCIFEROL) 1.25 MG (06414 UT) capsule vitamin d 08927 unit caps   Yes Ramona Coles MD   fluticasone (FLONASE) 50 MCG/ACT nasal spray 2 sprays into the nostril(s) as directed by provider Daily.   Yes Ramona Coles MD   metoprolol succinate XL (TOPROL-XL) 50 MG 24 hr tablet Take 1 tablet by mouth Daily for 90 days. 2/18/22  Yes Ed Myers DO   sertraline (ZOLOFT) 100 MG tablet Take 1 tablet by mouth Daily for 90 days. 2/18/22  Yes Ed Myers DO        Patient Active Problem List   Diagnosis   • Attention deficit hyperactivity disorder (ADHD)   • Essential hypertension   • Major depressive disorder, recurrent episode, in full remission (HCC)   • Difficulty urinating   • Benign prostatic hyperplasia with weak urinary stream   • Spondylolisthesis at L5-S1 level   • Abdominal hernia   • Depressive disorder   • Eczema   • Mixed hyperlipidemia   • Ulnar neuropathy at elbow of left upper extremity   • Vitamin D deficiency   • Insect bite of right ankle   • Annual physical exam        Past Surgical History:   Procedure Laterality Date   • APPENDECTOMY     • HAND SURGERY     • HERNIA REPAIR     • KNEE ACL RECONSTRUCTION Left 2000   • KNEE MENISCECTOMY  2015    lateral   •  "WISDOM TOOTH EXTRACTION         Social History     Socioeconomic History   • Marital status: Single   Tobacco Use   • Smoking status: Never Smoker   • Smokeless tobacco: Never Used   Vaping Use   • Vaping Use: Never used   Substance and Sexual Activity   • Alcohol use: Never   • Drug use: Never   • Sexual activity: Defer       Family History   Problem Relation Age of Onset   • Heart disease Paternal Grandmother    • Heart disease Paternal Grandfather    • Cancer Paternal Grandfather    • Diabetes Other        Family history, surgical history, past medical history, Allergies and meds reviewed with patient today and updated in [facility] EMR.     ROS:  Review of Systems   Constitutional: Negative for fatigue.   HENT: Negative for congestion, postnasal drip and rhinorrhea.    Eyes: Negative for blurred vision and visual disturbance.   Respiratory: Negative for cough, chest tightness, shortness of breath and wheezing.    Cardiovascular: Negative for chest pain and palpitations.   Gastrointestinal: Negative for abdominal pain, constipation and diarrhea.   Endocrine: Negative for polydipsia and polyuria.   Skin: Positive for skin lesions.   Allergic/Immunologic: Negative for environmental allergies.   Neurological: Negative for headache.   Psychiatric/Behavioral: Negative for decreased concentration, sleep disturbance and depressed mood. The patient is not nervous/anxious.        OBJECTIVE:  Vitals:    07/28/22 0850   BP: 128/84   BP Location: Left arm   Patient Position: Sitting   Cuff Size: Adult   Pulse: 73   Temp: 97.3 °F (36.3 °C)   TempSrc: Temporal   SpO2: 100%   Weight: 120 kg (265 lb 6.4 oz)   Height: 195.6 cm (77\")     No exam data present   Body mass index is 31.47 kg/m².  No LMP for male patient.    Physical Exam  Vitals and nursing note reviewed.   Constitutional:       General: He is not in acute distress.     Appearance: Normal appearance. He is normal weight.   HENT:      Head: Normocephalic.      Right Ear: " Tympanic membrane, ear canal and external ear normal.      Left Ear: Tympanic membrane, ear canal and external ear normal.      Nose: Nose normal.      Mouth/Throat:      Mouth: Mucous membranes are moist.      Pharynx: Oropharynx is clear.   Eyes:      General: No scleral icterus.     Conjunctiva/sclera: Conjunctivae normal.      Pupils: Pupils are equal, round, and reactive to light.   Cardiovascular:      Rate and Rhythm: Normal rate and regular rhythm.      Pulses: Normal pulses.      Heart sounds: Normal heart sounds. No murmur heard.  Pulmonary:      Effort: Pulmonary effort is normal.      Breath sounds: Normal breath sounds. No wheezing, rhonchi or rales.   Abdominal:      General: Abdomen is flat. Bowel sounds are normal.      Palpations: Abdomen is soft. There is no mass.      Tenderness: There is no abdominal tenderness.   Musculoskeletal:      Cervical back: Neck supple. No rigidity or tenderness.   Lymphadenopathy:      Cervical: No cervical adenopathy.   Skin:     General: Skin is warm and dry.      Coloration: Skin is not jaundiced.      Findings: Ecchymosis and rash present. Rash is macular.          Neurological:      General: No focal deficit present.      Mental Status: He is alert and oriented to person, place, and time.   Psychiatric:         Mood and Affect: Mood normal.         Thought Content: Thought content normal.         Judgment: Judgment normal.         Procedures    No visits with results within 30 Day(s) from this visit.   Latest known visit with results is:   Office Visit on 05/24/2022   Component Date Value Ref Range Status   • Amphetamine Screen, Urine 05/24/2022 Positive (A) Negative Final   • AMP INTERNAL CONTROL 05/24/2022 Passed  Passed Final   • Barbiturates Screen, Urine 05/24/2022 Negative  Negative Final   • BARBITURATE INTERNAL CONTROL 05/24/2022 Passed  Passed Final   • Buprenorphine, Screen, Urine 05/24/2022 Negative  Negative Final   • BUPRENORPHINE INTERNAL CONTROL  05/24/2022 Passed  Passed Final   • Benzodiazepine Screen, Urine 05/24/2022 Negative  Negative Final   • BENZODIAZEPINE INTERNAL CONTROL 05/24/2022 Passed  Passed Final   • Cocaine Screen, Urine 05/24/2022 Negative  Negative Final   • COCAINE INTERNAL CONTROL 05/24/2022 Passed  Passed Final   • MDMA (ECSTASY) 05/24/2022 Negative  Negative Final   • MDMA (ECSTASY) INTERNAL CONTROL 05/24/2022 Passed  Passed Final   • Methamphetamine, Ur 05/24/2022 Negative  Negative Final   • METHAMPHETAMINE INTERNAL CONTROL 05/24/2022 Passed  Passed Final   • Methadone Screen, Urine 05/24/2022 Negative  Negative Final   • METHADONE INTERNAL CONTROL 05/24/2022 Passed  Passed Final   • Opiate Screen 05/24/2022 Negative  Negative Final   • OPIATES INTERNAL CONTROL 05/24/2022 Passed  Passed Final   • Oxycodone Screen, Urine 05/24/2022 Negative  Negative Final   • OXYCODONE INTERNAL CONTROL 05/24/2022 Passed  Passed Final   • Phencyclidine (PCP), Urine 05/24/2022 Negative  Negative Final   • PHENCYCLIDINE INTERNAL CONTROL 05/24/2022 Passed  Passed Final   • THC, Screen, Urine 05/24/2022 Negative  Negative Final   • THC INTERNAL CONTROL 05/24/2022 Passed  Passed Final   • Lot Number 05/24/2022 [id number]   Final   • Expiration Date 05/24/2022 11/02/2023   Final   • Glucose 05/24/2022 87  65 - 99 mg/dL Final   • BUN 05/24/2022 14  6 - 20 mg/dL Final   • Creatinine 05/24/2022 1.03  0.76 - 1.27 mg/dL Final   • Sodium 05/24/2022 141  136 - 145 mmol/L Final   • Potassium 05/24/2022 3.7  3.5 - 5.2 mmol/L Final   • Chloride 05/24/2022 104  98 - 107 mmol/L Final   • CO2 05/24/2022 27.7  22.0 - 29.0 mmol/L Final   • Calcium 05/24/2022 9.1  8.6 - 10.5 mg/dL Final   • Total Protein 05/24/2022 6.5  6.0 - 8.5 g/dL Final   • Albumin 05/24/2022 4.20  3.50 - 5.20 g/dL Final   • ALT (SGPT) 05/24/2022 21  1 - 41 U/L Final   • AST (SGOT) 05/24/2022 20  1 - 40 U/L Final   • Alkaline Phosphatase 05/24/2022 53  39 - 117 U/L Final   • Total Bilirubin 05/24/2022  0.4  0.0 - 1.2 mg/dL Final   • Globulin 05/24/2022 2.3  gm/dL Final   • A/G Ratio 05/24/2022 1.8  g/dL Final   • BUN/Creatinine Ratio 05/24/2022 13.6  7.0 - 25.0 Final   • Anion Gap 05/24/2022 9.3  5.0 - 15.0 mmol/L Final   • eGFR 05/24/2022 94.8  >60.0 mL/min/1.73 Final    National Kidney Foundation and American Society of Nephrology (ASN) Task Force recommended calculation based on the Chronic Kidney Disease Epidemiology Collaboration (CKD-EPI) equation refit without adjustment for race.   • Total Cholesterol 05/24/2022 149  0 - 200 mg/dL Final   • Triglycerides 05/24/2022 100  0 - 150 mg/dL Final   • HDL Cholesterol 05/24/2022 42  40 - 60 mg/dL Final   • LDL Cholesterol  05/24/2022 88  0 - 100 mg/dL Final   • VLDL Cholesterol 05/24/2022 19  5 - 40 mg/dL Final   • LDL/HDL Ratio 05/24/2022 2.07   Final       ASSESSMENT/ PLAN:    Diagnoses and all orders for this visit:    1. Attention deficit hyperactivity disorder (ADHD), predominantly inattentive type (Primary)  Assessment & Plan:  He is doing well with his current medication and dosage.  Would not make any change at this time.      2. Spondylolisthesis at L5-S1 level  Assessment & Plan:  He is having some recurrent back pain.  His last x-rays were least 5 years ago.  I would just have him continue his stretching exercises some ibuprofen as needed and update his x-rays.    Orders:  -     XR Spine Lumbar Complete 4+VW; Future    3. Insect bite of right ankle, initial encounter  Assessment & Plan:  He only has some intermittent itching.  He may apply just some hydrocortisone cream and watch for any further changes of this lesion.      4. Annual physical exam  Assessment & Plan:  We will get some routine baseline labs as well as additional ones required for his work.    Orders:  -     Comprehensive Metabolic Panel  -     Lipid Panel  -     Hemoglobin A1c      Orders Placed Today:     No orders of the defined types were placed in this encounter.       Management  Plan:     An After Visit Summary was printed and given to the patient at discharge.    Follow-up: Return in about 3 months (around 10/28/2022) for Recheck.    Ed Myers DO 7/28/2022 09:21 EDT  This note was electronically signed.

## 2022-07-28 NOTE — ASSESSMENT & PLAN NOTE
He is having some recurrent back pain.  His last x-rays were least 5 years ago.  I would just have him continue his stretching exercises some ibuprofen as needed and update his x-rays.

## 2022-09-13 DIAGNOSIS — H69.82 OTHER SPECIFIED DISORDERS OF EUSTACHIAN TUBE, LEFT EAR: ICD-10-CM

## 2022-09-13 RX ORDER — FLUTICASONE PROPIONATE 50 MCG
SPRAY, SUSPENSION (ML) NASAL
Qty: 48 ML | Refills: 5 | Status: SHIPPED | OUTPATIENT
Start: 2022-09-13

## 2022-09-23 ENCOUNTER — TELEPHONE (OUTPATIENT)
Dept: FAMILY MEDICINE CLINIC | Facility: CLINIC | Age: 42
End: 2022-09-23

## 2022-09-23 DIAGNOSIS — F90.0 ATTENTION DEFICIT HYPERACTIVITY DISORDER (ADHD), PREDOMINANTLY INATTENTIVE TYPE: ICD-10-CM

## 2022-09-23 RX ORDER — DEXTROAMPHETAMINE SACCHARATE, AMPHETAMINE ASPARTATE MONOHYDRATE, DEXTROAMPHETAMINE SULFATE AND AMPHETAMINE SULFATE 5; 5; 5; 5 MG/1; MG/1; MG/1; MG/1
20 CAPSULE, EXTENDED RELEASE ORAL DAILY
Qty: 90 CAPSULE | Refills: 0 | Status: SHIPPED | OUTPATIENT
Start: 2022-09-23 | End: 2022-12-13 | Stop reason: SDUPTHER

## 2022-09-23 NOTE — TELEPHONE ENCOUNTER
Caller: Shiva Varela    Relationship: Self    Best call back number: [phone]    Requested Prescriptions:   ADDERALL XR  20 MG    [facility] where request [facility] be sent[facility] - [place], [place] - [address] AT [address]AL TO [facility] - [phone]  - [phone]      Additional details provided by patient: PATIENT STATED REQUEST FOR 90 DAY SUPPLY TO [facility]     Does the patient have less than a 3 day supply:  [] Yes  [x] No    Demetria Varela Rep   09/23/22 08:23 EDT

## 2022-10-17 ENCOUNTER — OFFICE VISIT (OUTPATIENT)
Dept: FAMILY MEDICINE CLINIC | Facility: CLINIC | Age: 42
End: 2022-10-17

## 2022-10-17 VITALS
OXYGEN SATURATION: 98 % | BODY MASS INDEX: 30.96 KG/M2 | DIASTOLIC BLOOD PRESSURE: 81 MMHG | WEIGHT: 262.2 LBS | SYSTOLIC BLOOD PRESSURE: 138 MMHG | HEART RATE: 81 BPM | HEIGHT: 77 IN | TEMPERATURE: 98.2 F

## 2022-10-17 DIAGNOSIS — F90.0 ATTENTION DEFICIT HYPERACTIVITY DISORDER (ADHD), PREDOMINANTLY INATTENTIVE TYPE: ICD-10-CM

## 2022-10-17 DIAGNOSIS — K21.9 GASTROESOPHAGEAL REFLUX DISEASE WITHOUT ESOPHAGITIS: ICD-10-CM

## 2022-10-17 DIAGNOSIS — G25.0 BENIGN ESSENTIAL TREMOR: Primary | ICD-10-CM

## 2022-10-17 PROCEDURE — 36415 COLL VENOUS BLD VENIPUNCTURE: CPT | Performed by: FAMILY MEDICINE

## 2022-10-17 PROCEDURE — 84439 ASSAY OF FREE THYROXINE: CPT | Performed by: FAMILY MEDICINE

## 2022-10-17 PROCEDURE — 84443 ASSAY THYROID STIM HORMONE: CPT | Performed by: FAMILY MEDICINE

## 2022-10-17 PROCEDURE — 99214 OFFICE O/P EST MOD 30 MIN: CPT | Performed by: FAMILY MEDICINE

## 2022-10-17 RX ORDER — METOPROLOL SUCCINATE 100 MG/1
100 TABLET, EXTENDED RELEASE ORAL DAILY
Qty: 90 TABLET | Refills: 1 | Status: SHIPPED | OUTPATIENT
Start: 2022-10-17 | End: 2023-01-16 | Stop reason: SDUPTHER

## 2022-10-17 RX ORDER — OMEPRAZOLE 20 MG/1
20 CAPSULE, DELAYED RELEASE ORAL DAILY
Qty: 90 CAPSULE | Refills: 1 | Status: SHIPPED | OUTPATIENT
Start: 2022-10-17 | End: 2023-01-16 | Stop reason: SDUPTHER

## 2022-10-17 NOTE — ASSESSMENT & PLAN NOTE
We will go ahead and start a PPI on a daily basis if this helps.  In the meantime instructed him on lifestyle changes of staying upright for 30 to 60 minutes after meals moderating caffeinated and alcoholic beverages as well as carbonated beverages as well.  Also instructed him to attempt to avoid overeating and any foods that seem to aggravate his symptoms.

## 2022-10-17 NOTE — ASSESSMENT & PLAN NOTE
He states he is doing well with his current medication and dosage.  He is able to stay on task maintain his concentration and finish task in a timely fashion.  The only issue we have is his tremor at this time.  The medication may be exacerbating that and 1 option would be to change to a nonstimulant such as Strattera or Qulbee.

## 2022-10-17 NOTE — ASSESSMENT & PLAN NOTE
He has what appears to be a benign essential tremor.  Instructed in some of this could be aggravated by his Adderall.  The only option there would be the change him off any stimulant and go to a nonstimulant medication that may or may not work as well.  We will try increasing up his metoprolol succinate from 50 to 100 mg daily.  Instructed the other option is we could try some Mysoline but is somewhat concerned about the potential for sedation.

## 2022-10-17 NOTE — PROGRESS NOTES
Chief Complaint   Patient presents with   • Tremors   • Heartburn        Subjective     Shiva Varela  has a past medical history of Callus, Essential hypertension (04/30/2019), and Ulnar neuropathy at elbow of left upper extremity (09/10/2020).    Reflux- he states that he has been having some reflux symptoms lately.  He states his symptoms tend to be worse with laying down.  He states he has intermittently taken some Tums with some benefit.  No particular food or drink seem to make it worse.    Tremors- he has noted an increase fine tremor.  He states at times it makes it difficult to do certain tasks.  In his job he is on the computer Keyboard a lot and it tends to be aggravating.      PHQ-2 Depression Screening  Little interest or pleasure in doing things?     Feeling down, depressed, or hopeless?     PHQ-2 Total Score     PHQ-9 Depression Screening  Little interest or pleasure in doing things?     Feeling down, depressed, or hopeless?     Trouble falling or staying asleep, or sleeping too much?     Feeling tired or having little energy?     Poor appetite or overeating?     Feeling bad about yourself - or that you are a failure or have let yourself or your family down?     Trouble concentrating on things, such as reading the newspaper or watching television?     Moving or speaking so slowly that other people could have noticed? Or the opposite - being so fidgety or restless that you have been moving around a lot more than usual?     Thoughts that you would be better off dead, or of hurting yourself in some way?     PHQ-9 Total Score     If you checked off any problems, how difficult have these problems made it for you to do your work, take care of things at home, or get along with other people?       Allergies   Allergen Reactions   • Bee Venom Unknown - Low Severity   • Codeine Unknown - Low Severity   • Latex Unknown - Low Severity     Persistent Contact        Prior to Admission medications    Medication  Sig Start Date End Date Taking? Authorizing Provider   amphetamine-dextroamphetamine XR (Adderall XR) 20 MG 24 hr capsule Take 1 capsule by mouth Daily for 90 days 9/23/22 12/22/22 Yes Ed Myers DO   ergocalciferol (ERGOCALCIFEROL) 1.25 MG (71814 UT) capsule vitamin d 43393 unit caps   Yes Provider, MD Ramona   fluticasone (FLONASE) 50 MCG/ACT nasal spray SPRAY 1 SPRAY INTO EACH NOSTRIL EVERY DAY 9/13/22  Yes Ed Myers DO   metoprolol succinate XL (TOPROL-XL) 50 MG 24 hr tablet Take 1 tablet by mouth Daily for 90 days. 2/18/22  Yes Ed Myers DO   sertraline (ZOLOFT) 100 MG tablet Take 1 tablet by mouth Daily for 90 days. 2/18/22  Yes Ed Myers DO        Patient Active Problem List   Diagnosis   • Attention deficit hyperactivity disorder (ADHD)   • Essential hypertension   • Major depressive disorder, recurrent episode, in full remission (HCC)   • Difficulty urinating   • Benign prostatic hyperplasia with weak urinary stream   • Spondylolisthesis at L5-S1 level   • Abdominal hernia   • Depressive disorder   • Eczema   • Mixed hyperlipidemia   • Ulnar neuropathy at elbow of left upper extremity   • Vitamin D deficiency   • Insect bite of right ankle   • Annual physical exam   • Benign essential tremor   • Gastroesophageal reflux disease without esophagitis        Past Surgical History:   Procedure Laterality Date   • APPENDECTOMY     • HAND SURGERY     • HERNIA REPAIR     • KNEE ACL RECONSTRUCTION Left 2000   • KNEE MENISCECTOMY  2015    lateral   • WISDOM TOOTH EXTRACTION         Social History     Socioeconomic History   • Marital status: Single   Tobacco Use   • Smoking status: Never   • Smokeless tobacco: Never   Vaping Use   • Vaping Use: Never used   Substance and Sexual Activity   • Alcohol use: Never   • Drug use: Never   • Sexual activity: Defer       Family History   Problem Relation Age of Onset   • Heart disease Paternal Grandmother    •  "Heart disease Paternal Grandfather    • Cancer Paternal Grandfather    • Diabetes Other        Family history, surgical history, past medical history, Allergies and meds reviewed with patient today and updated in [employer] EMR.     ROS:  Review of Systems   Gastrointestinal: Positive for GERD and indigestion.   Neurological: Positive for tremors.       OBJECTIVE:  Vitals:    10/17/22 1402   BP: 138/81   BP Location: Left arm   Patient Position: Sitting   Pulse: 81   Temp: 98.2 °F (36.8 °C)   SpO2: 98%   Weight: 119 kg (262 lb 3.2 oz)   Height: 195.6 cm (77\")     No results found.   Body mass index is 31.09 kg/m².  No LMP for male patient.    Physical Exam  Vitals and nursing note reviewed.   Constitutional:       General: He is not in acute distress.     Appearance: Normal appearance. He is normal weight.   HENT:      Head: Normocephalic.   Cardiovascular:      Rate and Rhythm: Normal rate and regular rhythm.      Heart sounds: Normal heart sounds. No murmur heard.  Pulmonary:      Effort: Pulmonary effort is normal.      Breath sounds: Normal breath sounds. No wheezing, rhonchi or rales.   Neurological:      Mental Status: He is alert and oriented to person, place, and time.   Psychiatric:         Mood and Affect: Mood normal.         Behavior: Behavior normal.         Thought Content: Thought content normal.         Judgment: Judgment normal.         Procedures    No visits with results within 30 Day(s) from this visit.   Latest known visit with results is:   Office Visit on 07/28/2022   Component Date Value Ref Range Status   • Glucose 07/28/2022 90  65 - 99 mg/dL Final   • BUN 07/28/2022 14  6 - 20 mg/dL Final   • Creatinine 07/28/2022 1.03  0.76 - 1.27 mg/dL Final   • Sodium 07/28/2022 139  136 - 145 mmol/L Final   • Potassium 07/28/2022 4.2  3.5 - 5.2 mmol/L Final   • Chloride 07/28/2022 102  98 - 107 mmol/L Final   • CO2 07/28/2022 29.2 (H)  22.0 - 29.0 mmol/L Final   • Calcium 07/28/2022 9.2  8.6 - 10.5 mg/dL " Final   • Total Protein 07/28/2022 6.5  6.0 - 8.5 g/dL Final   • Albumin 07/28/2022 4.30  3.50 - 5.20 g/dL Final   • ALT (SGPT) 07/28/2022 19  1 - 41 U/L Final   • AST (SGOT) 07/28/2022 20  1 - 40 U/L Final   • Alkaline Phosphatase 07/28/2022 52  39 - 117 U/L Final   • Total Bilirubin 07/28/2022 0.5  0.0 - 1.2 mg/dL Final   • Globulin 07/28/2022 2.2  gm/dL Final   • A/G Ratio 07/28/2022 2.0  g/dL Final   • BUN/Creatinine Ratio 07/28/2022 13.6  7.0 - 25.0 Final   • Anion Gap 07/28/2022 7.8  5.0 - 15.0 mmol/L Final   • eGFR 07/28/2022 94.8  >60.0 mL/min/1.73 Final    National Kidney Foundation and American Society of Nephrology (ASN) Task Force recommended calculation based on the Chronic Kidney Disease Epidemiology Collaboration (CKD-EPI) equation refit without adjustment for race.   • Total Cholesterol 07/28/2022 161  0 - 200 mg/dL Final   • Triglycerides 07/28/2022 77  0 - 150 mg/dL Final   • HDL Cholesterol 07/28/2022 45  40 - 60 mg/dL Final   • LDL Cholesterol  07/28/2022 101 (H)  0 - 100 mg/dL Final   • VLDL Cholesterol 07/28/2022 15  5 - 40 mg/dL Final   • LDL/HDL Ratio 07/28/2022 2.24   Final   • Hemoglobin A1C 07/28/2022 5.40  4.80 - 5.60 % Final       ASSESSMENT/ PLAN:    Diagnoses and all orders for this visit:    1. Benign essential tremor (Primary)  Assessment & Plan:  He has what appears to be a benign essential tremor.  Instructed in some of this could be aggravated by his Adderall.  The only option there would be the change him off any stimulant and go to a nonstimulant medication that may or may not work as well.  We will try increasing up his metoprolol succinate from 50 to 100 mg daily.  Instructed the other option is we could try some Mysoline but is somewhat concerned about the potential for sedation.    Orders:  -     TSH+Free T4    2. Gastroesophageal reflux disease without esophagitis  Assessment & Plan:  We will go ahead and start a PPI on a daily basis if this helps.  In the meantime  instructed him on lifestyle changes of staying upright for 30 to 60 minutes after meals moderating caffeinated and alcoholic beverages as well as carbonated beverages as well.  Also instructed him to attempt to avoid overeating and any foods that seem to aggravate his symptoms.      3. Attention deficit hyperactivity disorder (ADHD), predominantly inattentive type  Assessment & Plan:  He states he is doing well with his current medication and dosage.  He is able to stay on task maintain his concentration and finish task in a timely fashion.  The only issue we have is his tremor at this time.  The medication may be exacerbating that and 1 option would be to change to a nonstimulant such as Strattera or Qulbee.      Other orders  -     metoprolol succinate XL (TOPROL-XL) 100 MG 24 hr tablet; Take 1 tablet by mouth Daily for 90 days.  Dispense: 90 tablet; Refill: 1  -     omeprazole (priLOSEC) 20 MG capsule; Take 1 capsule by mouth Daily for 90 days.  Dispense: 90 capsule; Refill: 1      Orders Placed Today:     New Medications Ordered This Visit   Medications   • metoprolol succinate XL (TOPROL-XL) 100 MG 24 hr tablet     Sig: Take 1 tablet by mouth Daily for 90 days.     Dispense:  90 tablet     Refill:  1   • omeprazole (priLOSEC) 20 MG capsule     Sig: Take 1 capsule by mouth Daily for 90 days.     Dispense:  90 capsule     Refill:  1        Management Plan:     An After Visit Summary was printed and given to the patient at discharge.    Follow-up: Return in about 3 months (around 1/17/2023) for Recheck.    Ed Myers DO 10/17/2022 14:23 EDT  This note was electronically signed.

## 2022-10-18 LAB
T4 FREE SERPL-MCNC: 0.95 NG/DL (ref 0.93–1.7)
TSH SERPL DL<=0.05 MIU/L-ACNC: 0.8 UIU/ML (ref 0.27–4.2)

## 2022-12-13 DIAGNOSIS — F90.0 ATTENTION DEFICIT HYPERACTIVITY DISORDER (ADHD), PREDOMINANTLY INATTENTIVE TYPE: ICD-10-CM

## 2022-12-13 RX ORDER — DEXTROAMPHETAMINE SACCHARATE, AMPHETAMINE ASPARTATE MONOHYDRATE, DEXTROAMPHETAMINE SULFATE AND AMPHETAMINE SULFATE 5; 5; 5; 5 MG/1; MG/1; MG/1; MG/1
20 CAPSULE, EXTENDED RELEASE ORAL DAILY
Qty: 90 CAPSULE | Refills: 0 | Status: SHIPPED | OUTPATIENT
Start: 2022-12-13 | End: 2023-03-21 | Stop reason: SDUPTHER

## 2022-12-13 NOTE — TELEPHONE ENCOUNTER
Caller: Shiva Varela    Relationship: Self    Best call back number: [phone]    Requested Prescriptions:   Requested Prescriptions     Pending Prescriptions Disp Refills   • amphetamine-dextroamphetamine XR (Adderall XR) 20 MG 24 hr capsule 90 capsule 0     Sig: Take 1 capsule by mouth Daily for 90 days        Pharmacy where request should be sent[facility] - [address] [facility] AT PORTAL TO [facility] S[facility] - [phone] [facility] [phone] FX     Additional details provided by patient: 3 MONTH SUPPLY REQUESTED    Does the patient have less than a 3 day supply:  [] Yes  [x] No    Would you like a call back once the refill request has been completed: [x] Yes [] No    If the office needs to give you a call back, can they leave a voicemail: [x] Yes [] No    Demetria He Rep   12/13/22 09:14 EST

## 2022-12-15 RX ORDER — SERTRALINE HYDROCHLORIDE 100 MG/1
TABLET, FILM COATED ORAL
Qty: 90 TABLET | Refills: 3 | Status: SHIPPED | OUTPATIENT
Start: 2022-12-15

## 2023-01-16 ENCOUNTER — OFFICE VISIT (OUTPATIENT)
Dept: FAMILY MEDICINE CLINIC | Facility: CLINIC | Age: 43
End: 2023-01-16
Payer: COMMERCIAL

## 2023-01-16 VITALS
BODY MASS INDEX: 31.76 KG/M2 | TEMPERATURE: 97.8 F | HEIGHT: 77 IN | WEIGHT: 269 LBS | SYSTOLIC BLOOD PRESSURE: 127 MMHG | DIASTOLIC BLOOD PRESSURE: 80 MMHG | OXYGEN SATURATION: 97 % | HEART RATE: 96 BPM

## 2023-01-16 DIAGNOSIS — E78.2 MIXED HYPERLIPIDEMIA: ICD-10-CM

## 2023-01-16 DIAGNOSIS — K21.9 GASTROESOPHAGEAL REFLUX DISEASE WITHOUT ESOPHAGITIS: ICD-10-CM

## 2023-01-16 DIAGNOSIS — G25.0 BENIGN ESSENTIAL TREMOR: ICD-10-CM

## 2023-01-16 DIAGNOSIS — I10 ESSENTIAL HYPERTENSION: Primary | ICD-10-CM

## 2023-01-16 DIAGNOSIS — F90.0 ATTENTION DEFICIT HYPERACTIVITY DISORDER (ADHD), PREDOMINANTLY INATTENTIVE TYPE: ICD-10-CM

## 2023-01-16 DIAGNOSIS — Z11.59 NEED FOR HEPATITIS C SCREENING TEST: ICD-10-CM

## 2023-01-16 DIAGNOSIS — F33.42 MAJOR DEPRESSIVE DISORDER, RECURRENT EPISODE, IN FULL REMISSION: ICD-10-CM

## 2023-01-16 PROBLEM — L08.9 SKIN PUSTULE: Status: ACTIVE | Noted: 2023-01-16

## 2023-01-16 LAB
ALBUMIN SERPL-MCNC: 4.2 G/DL (ref 3.5–5.2)
ALBUMIN/GLOB SERPL: 1.6 G/DL
ALP SERPL-CCNC: 52 U/L (ref 39–117)
ALT SERPL W P-5'-P-CCNC: 30 U/L (ref 1–41)
AMPHET+METHAMPHET UR QL: POSITIVE
AMPHETAMINE INTERNAL CONTROL: ABNORMAL
AMPHETAMINES UR QL: NEGATIVE
ANION GAP SERPL CALCULATED.3IONS-SCNC: 7 MMOL/L (ref 5–15)
AST SERPL-CCNC: 25 U/L (ref 1–40)
BARBITURATE INTERNAL CONTROL: ABNORMAL
BARBITURATES UR QL SCN: NEGATIVE
BENZODIAZ UR QL SCN: NEGATIVE
BENZODIAZEPINE INTERNAL CONTROL: ABNORMAL
BILIRUB SERPL-MCNC: 0.4 MG/DL (ref 0–1.2)
BUN SERPL-MCNC: 14 MG/DL (ref 6–20)
BUN/CREAT SERPL: 14.4 (ref 7–25)
BUPRENORPHINE INTERNAL CONTROL: ABNORMAL
BUPRENORPHINE SERPL-MCNC: NEGATIVE NG/ML
CALCIUM SPEC-SCNC: 9.1 MG/DL (ref 8.6–10.5)
CANNABINOIDS SERPL QL: NEGATIVE
CHLORIDE SERPL-SCNC: 104 MMOL/L (ref 98–107)
CHOLEST SERPL-MCNC: 175 MG/DL (ref 0–200)
CO2 SERPL-SCNC: 31 MMOL/L (ref 22–29)
COCAINE INTERNAL CONTROL: ABNORMAL
COCAINE UR QL: NEGATIVE
CREAT SERPL-MCNC: 0.97 MG/DL (ref 0.76–1.27)
EGFRCR SERPLBLD CKD-EPI 2021: 101.2 ML/MIN/1.73
EXPIRATION DATE: ABNORMAL
GLOBULIN UR ELPH-MCNC: 2.6 GM/DL
GLUCOSE SERPL-MCNC: 81 MG/DL (ref 65–99)
HCV AB SER DONR QL: NORMAL
HDLC SERPL-MCNC: 43 MG/DL (ref 40–60)
LDLC SERPL CALC-MCNC: 107 MG/DL (ref 0–100)
LDLC/HDLC SERPL: 2.42 {RATIO}
Lab: ABNORMAL
MDMA (ECSTASY) INTERNAL CONTROL: ABNORMAL
MDMA UR QL SCN: NEGATIVE
METHADONE INTERNAL CONTROL: ABNORMAL
METHADONE UR QL SCN: NEGATIVE
METHAMPHETAMINE INTERNAL CONTROL: ABNORMAL
OPIATES INTERNAL CONTROL: ABNORMAL
OPIATES UR QL: NEGATIVE
OXYCODONE INTERNAL CONTROL: ABNORMAL
OXYCODONE UR QL SCN: NEGATIVE
PCP UR QL SCN: NEGATIVE
PHENCYCLIDINE INTERNAL CONTROL: ABNORMAL
POTASSIUM SERPL-SCNC: 4.5 MMOL/L (ref 3.5–5.2)
PROT SERPL-MCNC: 6.8 G/DL (ref 6–8.5)
SODIUM SERPL-SCNC: 142 MMOL/L (ref 136–145)
THC INTERNAL CONTROL: ABNORMAL
TRIGL SERPL-MCNC: 140 MG/DL (ref 0–150)
VLDLC SERPL-MCNC: 25 MG/DL (ref 5–40)

## 2023-01-16 PROCEDURE — 80061 LIPID PANEL: CPT | Performed by: FAMILY MEDICINE

## 2023-01-16 PROCEDURE — 86803 HEPATITIS C AB TEST: CPT | Performed by: FAMILY MEDICINE

## 2023-01-16 PROCEDURE — 80305 DRUG TEST PRSMV DIR OPT OBS: CPT | Performed by: FAMILY MEDICINE

## 2023-01-16 PROCEDURE — 99214 OFFICE O/P EST MOD 30 MIN: CPT | Performed by: FAMILY MEDICINE

## 2023-01-16 PROCEDURE — 80053 COMPREHEN METABOLIC PANEL: CPT | Performed by: FAMILY MEDICINE

## 2023-01-16 RX ORDER — METOPROLOL SUCCINATE 100 MG/1
100 TABLET, EXTENDED RELEASE ORAL DAILY
Qty: 90 TABLET | Refills: 1 | Status: SHIPPED | OUTPATIENT
Start: 2023-01-16

## 2023-01-16 RX ORDER — OMEPRAZOLE 20 MG/1
20 CAPSULE, DELAYED RELEASE ORAL DAILY
Qty: 90 CAPSULE | Refills: 1 | Status: SHIPPED | OUTPATIENT
Start: 2023-01-16 | End: 2023-04-16

## 2023-01-16 NOTE — PROGRESS NOTES
Chief Complaint   Patient presents with   • Follow-up     3 month    • Hypertension   • Hyperlipidemia        Subjective     Shiva Varela  has a past medical history of Callus, Essential hypertension (04/30/2019), and Ulnar neuropathy at elbow of left upper extremity (09/10/2020).    Hypertension- he rarely ever checks his blood pressure outside the office.  His blood pressure is good here today at 127/80.    Hyperlipidemia- currently he is not on any medication.    ADD- he states he is doing pretty good at this time.  He is able to stay on task and not be easily distracted.    Depression- he states he is doing well he takes his sertraline on a daily basis.      PHQ-2 Depression Screening  Little interest or pleasure in doing things?     Feeling down, depressed, or hopeless?     PHQ-2 Total Score     PHQ-9 Depression Screening  Little interest or pleasure in doing things?     Feeling down, depressed, or hopeless?     Trouble falling or staying asleep, or sleeping too much?     Feeling tired or having little energy?     Poor appetite or overeating?     Feeling bad about yourself - or that you are a failure or have let yourself or your family down?     Trouble concentrating on things, such as reading the newspaper or watching television?     Moving or speaking so slowly that other people could have noticed? Or the opposite - being so fidgety or restless that you have been moving around a lot more than usual?     Thoughts that you would be better off dead, or of hurting yourself in some way?     PHQ-9 Total Score     If you checked off any problems, how difficult have these problems made it for you to do your work, take care of things at home, or get along with other people?       Allergies   Allergen Reactions   • Bee Venom Unknown - Low Severity   • Codeine Unknown - Low Severity   • Latex Unknown - Low Severity     Persistent Contact        Prior to Admission medications    Medication Sig Start Date End Date  Taking? Authorizing Provider   amphetamine-dextroamphetamine XR (Adderall XR) 20 MG 24 hr capsule Take 1 capsule by mouth Daily for 90 days 12/13/22 3/13/23 Yes Ed Myers DO   ergocalciferol (ERGOCALCIFEROL) 1.25 MG (75411 UT) capsule vitamin d 45336 unit caps   Yes Provider, MD Ramona   fluticasone (FLONASE) 50 MCG/ACT nasal spray SPRAY 1 SPRAY INTO EACH NOSTRIL EVERY DAY 9/13/22  Yes Ed Myers DO   metoprolol succinate XL (TOPROL-XL) 100 MG 24 hr tablet Take 1 tablet by mouth Daily for 90 days. 10/17/22  Yes Ed Myers DO   omeprazole (priLOSEC) 20 MG capsule Take 1 capsule by mouth Daily for 90 days. 10/17/22 1/16/23 Yes Ed Myers DO   sertraline (ZOLOFT) 100 MG tablet TAKE 1 TABLET DAILY 12/15/22  Yes Ed Myers DO        Patient Active Problem List   Diagnosis   • Attention deficit hyperactivity disorder (ADHD)   • Essential hypertension   • Major depressive disorder, recurrent episode, in full remission (HCC)   • Difficulty urinating   • Benign prostatic hyperplasia with weak urinary stream   • Spondylolisthesis at L5-S1 level   • Abdominal hernia   • Depressive disorder   • Eczema   • Mixed hyperlipidemia   • Ulnar neuropathy at elbow of left upper extremity   • Vitamin D deficiency   • Insect bite of right ankle   • Annual physical exam   • Benign essential tremor   • Gastroesophageal reflux disease without esophagitis   • Skin pustule        Past Surgical History:   Procedure Laterality Date   • APPENDECTOMY     • HAND SURGERY     • HERNIA REPAIR     • KNEE ACL RECONSTRUCTION Left 2000   • KNEE MENISCECTOMY  2015    lateral   • WISDOM TOOTH EXTRACTION         Social History     Socioeconomic History   • Marital status: Single   Tobacco Use   • Smoking status: Never   • Smokeless tobacco: Never   Vaping Use   • Vaping Use: Never used   Substance and Sexual Activity   • Alcohol use: Never   • Drug use: Never   • Sexual activity:  "Defer       Family History   Problem Relation Age of Onset   • Heart disease Paternal Grandmother    • Heart disease Paternal Grandfather    • Cancer Paternal Grandfather    • Diabetes Other        Family history, surgical history, past medical history, Allergies and meds reviewed with patient today and updated in [facility] EMR.     ROS:  Review of Systems   Constitutional: Negative for fatigue.   HENT: Positive for postnasal drip. Negative for congestion and rhinorrhea.    Eyes: Negative for blurred vision and visual disturbance.   Respiratory: Negative for cough, chest tightness, shortness of breath and wheezing.    Cardiovascular: Negative for chest pain and palpitations.   Skin: Positive for skin lesions.   Allergic/Immunologic: Negative for environmental allergies.   Neurological: Positive for tremors. Negative for headache.   Psychiatric/Behavioral: Negative for decreased concentration and depressed mood. The patient is not nervous/anxious.        OBJECTIVE:  Vitals:    01/16/23 0904   BP: 127/80   BP Location: Right arm   Patient Position: Sitting   Pulse: 96   Temp: 97.8 °F (36.6 °C)   SpO2: 97%   Weight: 122 kg (269 lb)   Height: 195.6 cm (77\")     No results found.   Body mass index is 31.9 kg/m².  No LMP for male patient.    Physical Exam  Vitals and nursing note reviewed.   Constitutional:       General: He is not in acute distress.     Appearance: Normal appearance. He is normal weight.   HENT:      Head: Normocephalic.      Right Ear: Tympanic membrane, ear canal and external ear normal.      Left Ear: Tympanic membrane, ear canal and external ear normal.      Nose: Nose normal.      Mouth/Throat:      Mouth: Mucous membranes are moist.      Pharynx: Oropharynx is clear.   Eyes:      General: No scleral icterus.     Conjunctiva/sclera: Conjunctivae normal.      Pupils: Pupils are equal, round, and reactive to light.   Cardiovascular:      Rate and Rhythm: Normal rate and regular rhythm.      Pulses: Normal " pulses.      Heart sounds: Normal heart sounds. No murmur heard.  Pulmonary:      Effort: Pulmonary effort is normal.      Breath sounds: Normal breath sounds. No wheezing, rhonchi or rales.   Musculoskeletal:      Cervical back: Neck supple. No rigidity or tenderness.        Feet:    Lymphadenopathy:      Cervical: No cervical adenopathy.   Skin:     General: Skin is warm and dry.      Coloration: Skin is not jaundiced.      Findings: No rash.   Neurological:      General: No focal deficit present.      Mental Status: He is alert and oriented to person, place, and time.   Psychiatric:         Mood and Affect: Mood normal.         Thought Content: Thought content normal.         Judgment: Judgment normal.         Procedures    No visits with results within 30 Day(s) from this visit.   Latest known visit with results is:   Office Visit on 10/17/2022   Component Date Value Ref Range Status   • TSH 10/17/2022 0.799  0.270 - 4.200 uIU/mL Final   • Free T4 10/17/2022 0.95  0.93 - 1.70 ng/dL Final    T4 results may be falsely increased if patient taking Biotin.       ASSESSMENT/ PLAN:    Diagnoses and all orders for this visit:    1. Essential hypertension (Primary)  Assessment & Plan:  His blood pressure is good here today.  We will update his labs continue his current meds.    Orders:  -     Comprehensive Metabolic Panel  -     Lipid Panel    2. Mixed hyperlipidemia  -     Comprehensive Metabolic Panel  -     Lipid Panel    3. Attention deficit hyperactivity disorder (ADHD), predominantly inattentive type  Assessment & Plan:  He is doing well with his current medication and dosage.    Orders:  -     POC Urine Drug Screen [facility]    4. Major depressive disorder, recurrent episode, in full remission ([facility])  Assessment & Plan:  Patient's depression is recurrent and is mild without psychosis. Their depression is currently in full remission and the condition is unchanged. This will be reassessed at the next regular  appointment. F/U as described:patient will continue current medication therapy.      5. Gastroesophageal reflux disease without esophagitis  Assessment & Plan:  He states his reflux symptoms are much improved with the omeprazole on a daily basis.      6. Benign essential tremor  Assessment & Plan:  He states his and tremor is improved although persistent with the metoprolol.      Other orders  -     omeprazole (priLOSEC) 20 MG capsule; Take 1 capsule by mouth Daily for 90 days.  Dispense: 90 capsule; Refill: 1  -     metoprolol succinate XL (TOPROL-XL) 100 MG 24 hr tablet; Take 1 tablet by mouth Daily for 90 days.  Dispense: 90 tablet; Refill: 1      Orders Placed Today:     New Medications Ordered This Visit   Medications   • omeprazole (priLOSEC) 20 MG capsule     Sig: Take 1 capsule by mouth Daily for 90 days.     Dispense:  90 capsule     Refill:  1   • metoprolol succinate XL (TOPROL-XL) 100 MG 24 hr tablet     Sig: Take 1 tablet by mouth Daily for 90 days.     Dispense:  90 tablet     Refill:  1        Management Plan:     An After Visit Summary was printed and given to the patient at discharge.    Follow-up: Return in about 3 months (around 4/16/2023) for Recheck.    Ed Myers DO 1/16/2023 09:22 EST  This note was electronically signed.

## 2023-01-16 NOTE — ASSESSMENT & PLAN NOTE
He has a small pustule over the posterior aspect of his left heel.  He states it was originally somewhat inflamed and tender.  He opened up and had some purulent drainage.  Since then it has been improved.

## 2023-03-21 ENCOUNTER — TELEPHONE (OUTPATIENT)
Dept: FAMILY MEDICINE CLINIC | Facility: CLINIC | Age: 43
End: 2023-03-21

## 2023-03-21 DIAGNOSIS — F90.0 ATTENTION DEFICIT HYPERACTIVITY DISORDER (ADHD), PREDOMINANTLY INATTENTIVE TYPE: ICD-10-CM

## 2023-03-21 RX ORDER — DEXTROAMPHETAMINE SACCHARATE, AMPHETAMINE ASPARTATE MONOHYDRATE, DEXTROAMPHETAMINE SULFATE AND AMPHETAMINE SULFATE 5; 5; 5; 5 MG/1; MG/1; MG/1; MG/1
20 CAPSULE, EXTENDED RELEASE ORAL DAILY
Qty: 90 CAPSULE | Refills: 0 | Status: SHIPPED | OUTPATIENT
Start: 2023-03-21 | End: 2023-03-25 | Stop reason: SDUPTHER

## 2023-03-21 NOTE — TELEPHONE ENCOUNTER
Caller: Shiva Varela    Relationship: Self    Best call back number: [phone]    Requested Prescriptions:   ADDERALL XR     Pharmacy where request should be sent[facility] [place] [facility] - [address] [facility] AT PORTAL TO [facility] - [phone]  - [phone] FX     Last office visit with prescribing clinician: 1/16/2023   Last telemedicine visit with prescribing clinician: 4/17/2023   Next office visit with prescribing clinician: 4/17/2023       Does the patient have less than a 3 day supply:  [] Yes  [x] No    Would you like a call back once the refill request has been completed: [x] Yes [] No    If the office needs to give you a call back, can they leave a voicemail: [x] Yes [] No    Demetria Bergman Rep   03/21/23 09:12 EDT

## 2023-03-22 NOTE — TELEPHONE ENCOUNTER
PATIENT CALLED BACK TO CONFIRM THAT HE IS WILLING TO GET THIS MEDICATION AT A LOCAL PHARMACY. PATIENT CALLED AND WAS TRANSFERRED TO SOMEONE IN THE OFFICE AND WAS NEVER ABLE TO SPEAK TO SOMEONE ABOUT AN UPDATE. PATIENT IS ASKING FOR A RETURN CALL.

## 2023-03-24 ENCOUNTER — TELEPHONE (OUTPATIENT)
Dept: FAMILY MEDICINE CLINIC | Facility: CLINIC | Age: 43
End: 2023-03-24
Payer: COMMERCIAL

## 2023-03-24 NOTE — TELEPHONE ENCOUNTER
Patients adderall was out at both of his pharmacies. [facility] has this available. Please resend to this pharmacy.

## 2023-03-25 DIAGNOSIS — F90.0 ATTENTION DEFICIT HYPERACTIVITY DISORDER (ADHD), PREDOMINANTLY INATTENTIVE TYPE: ICD-10-CM

## 2023-03-25 RX ORDER — DEXTROAMPHETAMINE SACCHARATE, AMPHETAMINE ASPARTATE MONOHYDRATE, DEXTROAMPHETAMINE SULFATE AND AMPHETAMINE SULFATE 5; 5; 5; 5 MG/1; MG/1; MG/1; MG/1
20 CAPSULE, EXTENDED RELEASE ORAL DAILY
Qty: 90 CAPSULE | Refills: 0 | Status: SHIPPED | OUTPATIENT
Start: 2023-03-25 | End: 2023-06-23

## 2023-04-11 RX ORDER — METOPROLOL SUCCINATE 100 MG/1
TABLET, EXTENDED RELEASE ORAL
Qty: 90 TABLET | Refills: 1 | Status: SHIPPED | OUTPATIENT
Start: 2023-04-11

## 2023-04-11 RX ORDER — OMEPRAZOLE 20 MG/1
CAPSULE, DELAYED RELEASE ORAL
Qty: 90 CAPSULE | Refills: 1 | Status: SHIPPED | OUTPATIENT
Start: 2023-04-11

## 2023-04-17 ENCOUNTER — TELEPHONE (OUTPATIENT)
Dept: FAMILY MEDICINE CLINIC | Facility: CLINIC | Age: 43
End: 2023-04-17

## 2023-04-17 DIAGNOSIS — F90.0 ATTENTION DEFICIT HYPERACTIVITY DISORDER (ADHD), PREDOMINANTLY INATTENTIVE TYPE: ICD-10-CM

## 2023-04-17 RX ORDER — DEXTROAMPHETAMINE SACCHARATE, AMPHETAMINE ASPARTATE MONOHYDRATE, DEXTROAMPHETAMINE SULFATE AND AMPHETAMINE SULFATE 5; 5; 5; 5 MG/1; MG/1; MG/1; MG/1
20 CAPSULE, EXTENDED RELEASE ORAL DAILY
Qty: 90 CAPSULE | Refills: 0 | Status: SHIPPED | OUTPATIENT
Start: 2023-04-17 | End: 2023-07-16

## 2023-04-17 NOTE — TELEPHONE ENCOUNTER
Caller: Nichole Shiva J    Relationship: Self    Best call back number: [phone]    Requested Prescriptions:   Requested Prescriptions     Pending Prescriptions Disp Refills   • amphetamine-dextroamphetamine XR (Adderall XR) 20 MG 24 hr capsule 90 capsule 0     Sig: Take 1 capsule by mouth Daily for 90 days        Pharm[facility] where request [facility] be sent[facility] [place] [facility] - [address] [facility] AT PORTAL TO [facility] - [phone] [facility] [phone] FX     Last office visit with prescribing clinician: 1/16/2023   Last telemedicine visit with prescribing clinician: 5/11/2023   Next office visit with prescribing clinician: 5/11/2023     Additional details provided by patient: PATIENT WILL RUN OUT OF MEDICATION ON 5.2.23 BUT HIS NEXT APPOINTMENT IS NOT UNTIL 5.11.23. PLEASE ADVISE    Does the patient have less than a 3 day supply:  [] Yes  [x] No    Would you like a call back once the refill request has been completed: [x] Yes [] No    If the office needs to give you a call back, can they leave a voicemail: [x] Yes [] No    Demetria Kendall Rep   04/17/23 09:57 EDT

## 2023-05-04 ENCOUNTER — TELEPHONE (OUTPATIENT)
Dept: FAMILY MEDICINE CLINIC | Facility: CLINIC | Age: 43
End: 2023-05-04
Payer: COMMERCIAL

## 2023-05-04 NOTE — TELEPHONE ENCOUNTER
Informed patient that dr tran will talk about this at his appt. And they can figure out the best tx option, xray, joint injection.. that ella of thing. Patient was good with that.

## 2023-05-04 NOTE — TELEPHONE ENCOUNTER
Caller: Shiva Varela    Relationship: Self    Best call back number: [phone]    What is the medical concern/diagnosis: HIP PAIN, POPPING       Any additional details:        THE PATIENT IS REQUESTING A REFERRAL TO A SPECIALIST FOR HIS HIPS. THE  PATIENT SAID THAT HE HAS BEEN EXPERIENCING PAIN IN BOTH HIPS. HE SAID SOMETIMES HE FEELS LIKE IT GETS STUCK AND IT HURTS WHEN WALKING.     THE PATIENT SAID HE WENT TO THE [facility] YEARS AGO AND THEY SAW WEAR AND TEAR  IN HIS  CARTILAGE.          PLEASE ADVISE PATIENT

## 2023-05-11 ENCOUNTER — OFFICE VISIT (OUTPATIENT)
Dept: FAMILY MEDICINE CLINIC | Facility: CLINIC | Age: 43
End: 2023-05-11
Payer: COMMERCIAL

## 2023-05-11 VITALS
WEIGHT: 268 LBS | BODY MASS INDEX: 31.64 KG/M2 | HEART RATE: 86 BPM | SYSTOLIC BLOOD PRESSURE: 117 MMHG | DIASTOLIC BLOOD PRESSURE: 78 MMHG | TEMPERATURE: 97.2 F | OXYGEN SATURATION: 97 % | HEIGHT: 77 IN

## 2023-05-11 DIAGNOSIS — I10 ESSENTIAL HYPERTENSION: Primary | ICD-10-CM

## 2023-05-11 DIAGNOSIS — E78.2 MIXED HYPERLIPIDEMIA: ICD-10-CM

## 2023-05-11 DIAGNOSIS — M25.552 BILATERAL HIP PAIN: ICD-10-CM

## 2023-05-11 DIAGNOSIS — F90.0 ATTENTION DEFICIT HYPERACTIVITY DISORDER (ADHD), PREDOMINANTLY INATTENTIVE TYPE: ICD-10-CM

## 2023-05-11 DIAGNOSIS — M25.551 BILATERAL HIP PAIN: ICD-10-CM

## 2023-05-11 PROCEDURE — 99214 OFFICE O/P EST MOD 30 MIN: CPT | Performed by: FAMILY MEDICINE

## 2023-05-11 NOTE — ASSESSMENT & PLAN NOTE
His blood pressure is good here today.  Continue his current meds.  He just had labs 4 months ago and were good.

## 2023-05-11 NOTE — PROGRESS NOTES
Chief Complaint   Patient presents with   • Follow-up     3 month    C/O bilateral hip pain    • Hypertension   • Hyperlipidemia        Subjective     Shiva Varela  has a past medical history of Callus, Essential hypertension (04/30/2019), and Ulnar neuropathy at elbow of left upper extremity (09/10/2020).    Hip pain- he has had ongoing bilateral hip pain for about a year.  He states at times it feels like his hip will catch.  And with that we will have some pain.  After sitting and adjusting his hip he will oftentimes improved.    HTN-he does check his blood pressure at home intermittently.  When he does check it is consistently been good.    ADD- he states his focus concentration his attention is good.  He does take his medication on a daily basis.      PHQ-2 Depression Screening  Little interest or pleasure in doing things?     Feeling down, depressed, or hopeless?     PHQ-2 Total Score     PHQ-9 Depression Screening  Little interest or pleasure in doing things?     Feeling down, depressed, or hopeless?     Trouble falling or staying asleep, or sleeping too much?     Feeling tired or having little energy?     Poor appetite or overeating?     Feeling bad about yourself - or that you are a failure or have let yourself or your family down?     Trouble concentrating on things, such as reading the newspaper or watching television?     Moving or speaking so slowly that other people could have noticed? Or the opposite - being so fidgety or restless that you have been moving around a lot more than usual?     Thoughts that you would be better off dead, or of hurting yourself in some way?     PHQ-9 Total Score     If you checked off any problems, how difficult have these problems made it for you to do your work, take care of things at home, or get along with other people?       Allergies   Allergen Reactions   • Bee Venom Unknown - Low Severity   • Codeine Unknown - Low Severity   • Latex Unknown - Low Severity      Persistent Contact        Prior to Admission medications    Medication Sig Start Date End Date Taking? Authorizing Provider   amphetamine-dextroamphetamine XR (Adderall XR) 20 MG 24 hr capsule Take 1 capsule by mouth Daily for 90 days 4/17/23 7/16/23 Yes Ed Myers DO   ergocalciferol (ERGOCALCIFEROL) 1.25 MG (00111 UT) capsule vitamin d 45322 unit caps   Yes Provider, MD Ramona   fluticasone (FLONASE) 50 MCG/ACT nasal spray SPRAY 1 SPRAY INTO EACH NOSTRIL EVERY DAY 9/13/22  Yes Ed Myers DO   metoprolol succinate XL (TOPROL-XL) 100 MG 24 hr tablet TAKE 1 TABLET BY MOUTH EVERY DAY 4/11/23  Yes Ed Myers DO   omeprazole (priLOSEC) 20 MG capsule TAKE 1 CAPSULE BY MOUTH EVERY DAY 4/11/23  Yes Ed Myers DO   sertraline (ZOLOFT) 100 MG tablet TAKE 1 TABLET DAILY 12/15/22  Yes Ed Myers DO        Patient Active Problem List   Diagnosis   • Attention deficit hyperactivity disorder (ADHD)   • Essential hypertension   • Major depressive disorder, recurrent episode, in full remission   • Difficulty urinating   • Benign prostatic hyperplasia with weak urinary stream   • Spondylolisthesis at L5-S1 level   • Abdominal hernia   • Depressive disorder   • Eczema   • Mixed hyperlipidemia   • Ulnar neuropathy at elbow of left upper extremity   • Vitamin D deficiency   • Insect bite of right ankle   • Annual physical exam   • Benign essential tremor   • Gastroesophageal reflux disease without esophagitis   • Skin pustule   • Bilateral hip pain        Past Surgical History:   Procedure Laterality Date   • APPENDECTOMY     • HAND SURGERY     • HERNIA REPAIR     • KNEE ACL RECONSTRUCTION Left 2000   • KNEE MENISCECTOMY  2015    lateral   • WISDOM TOOTH EXTRACTION         Social History     Socioeconomic History   • Marital status: Single   Tobacco Use   • Smoking status: Never   • Smokeless tobacco: Never   Vaping Use   • Vaping Use: Never used   Substance and  "Sexual Activity   • Alcohol use: Never   • Drug use: Never   • Sexual activity: Defer       Family History   Problem Relation Age of Onset   • Heart disease Paternal Grandmother    • Heart disease Paternal Grandfather    • Cancer Paternal Grandfather    • Diabetes Other        Family history, surgical history, past medical history, Allergies and meds reviewed with patient today and updated in [facility] EMR.     ROS:  Review of Systems   Constitutional: Negative for fatigue.   Respiratory: Negative for cough, chest tightness, shortness of breath and wheezing.    Cardiovascular: Negative for chest pain and palpitations.   Musculoskeletal: Positive for arthralgias (hips).       OBJECTIVE:  Vitals:    05/11/23 0820   BP: 117/78   BP Location: Left arm   Patient Position: Sitting   Pulse: 86   Temp: 97.2 °F (36.2 °C)   SpO2: 97%   Weight: 122 kg (268 lb)   Height: 195.6 cm (77\")     No results found.   Body mass index is 31.78 kg/m².  No LMP for male patient.    Physical Exam  Vitals and nursing note reviewed.   Constitutional:       General: He is not in acute distress.     Appearance: Normal appearance. He is normal weight.   HENT:      Head: Normocephalic.   Cardiovascular:      Rate and Rhythm: Normal rate and regular rhythm.      Heart sounds: Normal heart sounds. No murmur heard.  Pulmonary:      Effort: Pulmonary effort is normal.      Breath sounds: Normal breath sounds. No wheezing.   Musculoskeletal:      Right hip: No deformity, tenderness or crepitus. Normal range of motion.      Left hip: No deformity, tenderness or crepitus. Normal range of motion.      Comments: Pain with int/ext rotation   Neurological:      Mental Status: He is alert.         Procedures    No visits with results within 30 Day(s) from this visit.   Latest known visit with results is:   Office Visit on 01/16/2023   Component Date Value Ref Range Status   • Glucose 01/16/2023 81  65 - 99 mg/dL Final   • BUN 01/16/2023 14  6 - 20 mg/dL Final   • " Creatinine 01/16/2023 0.97  0.76 - 1.27 mg/dL Final   • Sodium 01/16/2023 142  136 - 145 mmol/L Final   • Potassium 01/16/2023 4.5  3.5 - 5.2 mmol/L Final   • Chloride 01/16/2023 104  98 - 107 mmol/L Final   • CO2 01/16/2023 31.0 (H)  22.0 - 29.0 mmol/L Final   • Calcium 01/16/2023 9.1  8.6 - 10.5 mg/dL Final   • Total Protein 01/16/2023 6.8  6.0 - 8.5 g/dL Final   • Albumin 01/16/2023 4.2  3.5 - 5.2 g/dL Final   • ALT (SGPT) 01/16/2023 30  1 - 41 U/L Final   • AST (SGOT) 01/16/2023 25  1 - 40 U/L Final   • Alkaline Phosphatase 01/16/2023 52  39 - 117 U/L Final   • Total Bilirubin 01/16/2023 0.4  0.0 - 1.2 mg/dL Final   • Globulin 01/16/2023 2.6  gm/dL Final   • A/G Ratio 01/16/2023 1.6  g/dL Final   • BUN/Creatinine Ratio 01/16/2023 14.4  7.0 - 25.0 Final   • Anion Gap 01/16/2023 7.0  5.0 - 15.0 mmol/L Final   • eGFR 01/16/2023 101.2  >60.0 mL/min/1.73 Final    National Kidney Foundation and American Society of Nephrology (ASN) Task Force recommended calculation based on the Chronic Kidney Disease Epidemiology Collaboration (CKD-EPI) equation refit without adjustment for race.   • Total Cholesterol 01/16/2023 175  0 - 200 mg/dL Final   • Triglycerides 01/16/2023 140  0 - 150 mg/dL Final   • HDL Cholesterol 01/16/2023 43  40 - 60 mg/dL Final   • LDL Cholesterol  01/16/2023 107 (H)  0 - 100 mg/dL Final   • VLDL Cholesterol 01/16/2023 25  5 - 40 mg/dL Final   • LDL/HDL Ratio 01/16/2023 2.42   Final   • Amphetamine Screen, Urine 01/16/2023 Positive (A)  Negative Final   • AMP INTERNAL CONTROL 01/16/2023 Passed  Passed Final   • Barbiturates Screen, Urine 01/16/2023 Negative  Negative Final   • BARBITURATE INTERNAL CONTROL 01/16/2023 Passed  Passed Final   • Buprenorphine, Screen, Urine 01/16/2023 Negative  Negative Final   • BUPRENORPHINE INTERNAL CONTROL 01/16/2023 Passed  Passed Final   • Benzodiazepine Screen, Urine 01/16/2023 Negative  Negative Final   • BENZODIAZEPINE INTERNAL CONTROL 01/16/2023 Passed  Passed  Final   • Cocaine Screen, Urine 01/16/2023 Negative  Negative Final   • COCAINE INTERNAL CONTROL 01/16/2023 Passed  Passed Final   • MDMA (ECSTASY) 01/16/2023 Negative  Negative Final   • MDMA (ECSTASY) INTERNAL CONTROL 01/16/2023 Passed  Passed Final   • Methamphetamine, Ur 01/16/2023 Negative  Negative Final   • METHAMPHETAMINE INTERNAL CONTROL 01/16/2023 Passed  Passed Final   • Methadone Screen, Urine 01/16/2023 Negative  Negative Final   • METHADONE INTERNAL CONTROL 01/16/2023 Passed  Passed Final   • Opiate Screen 01/16/2023 Negative  Negative Final   • OPIATES INTERNAL CONTROL 01/16/2023 Passed  Passed Final   • Oxycodone Screen, Urine 01/16/2023 Negative  Negative Final   • OXYCODONE INTERNAL CONTROL 01/16/2023 Passed  Passed Final   • Phencyclidine (PCP), Urine 01/16/2023 Negative  Negative Final   • PHENCYCLIDINE INTERNAL CONTROL 01/16/2023 Passed  Passed Final   • THC, Screen, Urine 01/16/2023 Negative  Negative Final   • THC INTERNAL CONTROL 01/16/2023 Passed  Passed Final   • Lot Number 01/16/2023 [id number]   Final   • Expiration Date 01/16/2023 01/31/2024   Final   • Hepatitis C Ab 01/16/2023 Non-Reactive  Non-Reactive Final       ASSESSMENT/ PLAN:    Diagnoses and all orders for this visit:    1. Essential hypertension (Primary)  Assessment & Plan:  His blood pressure is good here today.  Continue his current meds.  He just had labs 4 months ago and were good.      2. Mixed hyperlipidemia  Assessment & Plan:  His most recent lipid profile was good without any current medication.      3. Attention deficit hyperactivity disorder (ADHD), predominantly inattentive type  Assessment & Plan:  He is doing well with his current medication and dosage.  He is not due for refill until July.      4. Bilateral hip pain  Assessment & Plan:  We will get x-rays of both hips.  Then reevaluate and decide what to do after that.    Orders:  -     XR Hip With or Without Pelvis 2 - 3 View Left; Future  -     XR Hip With or  Without Pelvis 2 - 3 View Right; Future      Orders Placed Today:     No orders of the defined types were placed in this encounter.       Management Plan:     An After Visit Summary was printed and given to the patient at discharge.    Follow-up: Return in about 3 months (around 8/11/2023) for Recheck.    Ed Myers DO 5/11/2023 08:44 EDT  This note was electronically signed.

## 2023-05-12 ENCOUNTER — HOSPITAL ENCOUNTER (OUTPATIENT)
Dept: GENERAL RADIOLOGY | Facility: HOSPITAL | Age: 43
Discharge: HOME | End: 2023-05-12
Payer: COMMERCIAL

## 2023-05-12 DIAGNOSIS — M25.551 BILATERAL HIP PAIN: ICD-10-CM

## 2023-05-12 DIAGNOSIS — M25.552 BILATERAL HIP PAIN: ICD-10-CM

## 2023-05-12 PROCEDURE — 73522 X-RAY EXAM HIPS BI 3-4 VIEWS: CPT

## 2023-05-15 DIAGNOSIS — M25.552 BILATERAL HIP PAIN: Primary | ICD-10-CM

## 2023-05-15 DIAGNOSIS — M25.551 BILATERAL HIP PAIN: Primary | ICD-10-CM

## 2023-08-14 RX ORDER — METOPROLOL SUCCINATE 100 MG/1
TABLET, EXTENDED RELEASE ORAL
Qty: 90 TABLET | Refills: 1 | Status: SHIPPED | OUTPATIENT
Start: 2023-08-14

## 2023-08-14 RX ORDER — OMEPRAZOLE 20 MG/1
CAPSULE, DELAYED RELEASE ORAL
Qty: 90 CAPSULE | Refills: 1 | Status: SHIPPED | OUTPATIENT
Start: 2023-08-14

## 2023-08-29 ENCOUNTER — OFFICE VISIT (OUTPATIENT)
Dept: FAMILY MEDICINE CLINIC | Facility: CLINIC | Age: 43
End: 2023-08-29
Payer: COMMERCIAL

## 2023-08-29 VITALS
HEIGHT: 77 IN | OXYGEN SATURATION: 96 % | TEMPERATURE: 96.7 F | SYSTOLIC BLOOD PRESSURE: 112 MMHG | DIASTOLIC BLOOD PRESSURE: 77 MMHG | WEIGHT: 270 LBS | HEART RATE: 86 BPM | BODY MASS INDEX: 31.88 KG/M2

## 2023-08-29 DIAGNOSIS — I10 ESSENTIAL HYPERTENSION: Primary | ICD-10-CM

## 2023-08-29 DIAGNOSIS — Z00.00 ANNUAL PHYSICAL EXAM: ICD-10-CM

## 2023-08-29 DIAGNOSIS — F90.0 ATTENTION DEFICIT HYPERACTIVITY DISORDER (ADHD), PREDOMINANTLY INATTENTIVE TYPE: ICD-10-CM

## 2023-08-29 LAB
ALBUMIN SERPL-MCNC: 4.3 G/DL (ref 3.5–5.2)
ALBUMIN/GLOB SERPL: 1.5 G/DL
ALP SERPL-CCNC: 49 U/L (ref 39–117)
ALT SERPL W P-5'-P-CCNC: 18 U/L (ref 1–41)
ANION GAP SERPL CALCULATED.3IONS-SCNC: 8.1 MMOL/L (ref 5–15)
AST SERPL-CCNC: 22 U/L (ref 1–40)
BILIRUB SERPL-MCNC: 0.4 MG/DL (ref 0–1.2)
BUN SERPL-MCNC: 14 MG/DL (ref 6–20)
BUN/CREAT SERPL: 15.2 (ref 7–25)
CALCIUM SPEC-SCNC: 9.3 MG/DL (ref 8.6–10.5)
CHLORIDE SERPL-SCNC: 103 MMOL/L (ref 98–107)
CHOLEST SERPL-MCNC: 159 MG/DL (ref 0–200)
CO2 SERPL-SCNC: 28.9 MMOL/L (ref 22–29)
CREAT SERPL-MCNC: 0.92 MG/DL (ref 0.76–1.27)
EGFRCR SERPLBLD CKD-EPI 2021: 107.8 ML/MIN/1.73
GLOBULIN UR ELPH-MCNC: 2.8 GM/DL
GLUCOSE SERPL-MCNC: 90 MG/DL (ref 65–99)
HBA1C MFR BLD: 5.4 % (ref 4.8–5.6)
HDLC SERPL-MCNC: 40 MG/DL (ref 40–60)
LDLC SERPL CALC-MCNC: 98 MG/DL (ref 0–100)
LDLC/HDLC SERPL: 2.41 {RATIO}
POTASSIUM SERPL-SCNC: 4.4 MMOL/L (ref 3.5–5.2)
PROT SERPL-MCNC: 7.1 G/DL (ref 6–8.5)
SODIUM SERPL-SCNC: 140 MMOL/L (ref 136–145)
TRIGL SERPL-MCNC: 113 MG/DL (ref 0–150)
VLDLC SERPL-MCNC: 21 MG/DL (ref 5–40)

## 2023-08-29 PROCEDURE — 80061 LIPID PANEL: CPT | Performed by: FAMILY MEDICINE

## 2023-08-29 PROCEDURE — 83036 HEMOGLOBIN GLYCOSYLATED A1C: CPT | Performed by: FAMILY MEDICINE

## 2023-08-29 PROCEDURE — 80053 COMPREHEN METABOLIC PANEL: CPT | Performed by: FAMILY MEDICINE

## 2023-08-29 NOTE — ASSESSMENT & PLAN NOTE
His blood pressure is good here today.  We will continue his current meds and get his annual labs.

## 2023-08-29 NOTE — ASSESSMENT & PLAN NOTE
He would like to change to an alternative medicine because of the tremor.  We discussed his options of extended release methylphenidate versus Vyvanse.  We will try to do the Vyvanse.

## 2023-08-29 NOTE — PROGRESS NOTES
Chief Complaint   Patient presents with    Employment Physical    Follow-up     3 month f/u    Medication Problem     Patient would like to discuss adderall as it causes tremors     Tinnitus     With itching        Subjective     Shiva Varela  has a past medical history of Callus, Essential hypertension (04/30/2019), and Ulnar neuropathy at elbow of left upper extremity (09/10/2020).    Annual exam-he states every year his employer requests an annual exam with annual blood work and BMI assessment.  In doing so he gets a discount on his insurance policy.    ADD-he states he has been doing well at the Adderall as far as attention focus concentration.  He does notice an increased tremor during the day though.      PHQ-2 Depression Screening  Little interest or pleasure in doing things?     Feeling down, depressed, or hopeless?     PHQ-2 Total Score     PHQ-9 Depression Screening  Little interest or pleasure in doing things?     Feeling down, depressed, or hopeless?     Trouble falling or staying asleep, or sleeping too much?     Feeling tired or having little energy?     Poor appetite or overeating?     Feeling bad about yourself - or that you are a failure or have let yourself or your family down?     Trouble concentrating on things, such as reading the newspaper or watching television?     Moving or speaking so slowly that other people could have noticed? Or the opposite - being so fidgety or restless that you have been moving around a lot more than usual?     Thoughts that you would be better off dead, or of hurting yourself in some way?     PHQ-9 Total Score     If you checked off any problems, how difficult have these problems made it for you to do your work, take care of things at home, or get along with other people?       Allergies   Allergen Reactions    Bee Venom Unknown - Low Severity    Codeine Unknown - Low Severity    Latex Unknown - Low Severity     Persistent Contact        Prior to Admission  medications    Medication Sig Start Date End Date Taking? Authorizing Provider   amphetamine-dextroamphetamine XR (Adderall XR) 20 MG 24 hr capsule Take 1 capsule by mouth Daily for 90 days 7/17/23 10/15/23 Yes Ed Myers DO   ergocalciferol (ERGOCALCIFEROL) 1.25 MG (74797 UT) capsule vitamin d 83718 unit caps   Yes Provider, MD Ramona   fluticasone (FLONASE) 50 MCG/ACT nasal spray SPRAY 1 SPRAY INTO EACH NOSTRIL EVERY DAY 9/13/22  Yes Ed Myers DO   metoprolol succinate XL (TOPROL-XL) 100 MG 24 hr tablet TAKE 1 TABLET DAILY 8/14/23  Yes Ed Myers DO   omeprazole (priLOSEC) 20 MG capsule TAKE 1 CAPSULE DAILY 8/14/23  Yes Ed Myers DO   sertraline (ZOLOFT) 100 MG tablet TAKE 1 TABLET DAILY 12/15/22  Yes Ed Myers DO        Patient Active Problem List   Diagnosis    Attention deficit hyperactivity disorder (ADHD)    Essential hypertension    Major depressive disorder, recurrent episode, in full remission    Difficulty urinating    Benign prostatic hyperplasia with weak urinary stream    Spondylolisthesis at L5-S1 level    Abdominal hernia    Depressive disorder    Eczema    Mixed hyperlipidemia    Ulnar neuropathy at elbow of left upper extremity    Vitamin D deficiency    Insect bite of right ankle    Annual physical exam    Benign essential tremor    Gastroesophageal reflux disease without esophagitis    Skin pustule    Bilateral hip pain        Past Surgical History:   Procedure Laterality Date    APPENDECTOMY      HAND SURGERY      HERNIA REPAIR      KNEE ACL RECONSTRUCTION Left 2000    KNEE MENISCECTOMY  2015    lateral    WISDOM TOOTH EXTRACTION         Social History     Socioeconomic History    Marital status: Single   Tobacco Use    Smoking status: Never    Smokeless tobacco: Never   Vaping Use    Vaping Use: Never used   Substance and Sexual Activity    Alcohol use: Never    Drug use: Never    Sexual activity: Defer       Family  "History   Problem Relation Age of Onset    Heart disease Paternal Grandmother     Heart disease Paternal Grandfather     Cancer Paternal Grandfather     Diabetes Other        Family history, surgical history, past medical history, Allergies and meds reviewed with patient today and updated in [facility] [facility] EMR.     ROS:  Review of Systems   Constitutional:  Negative for fatigue.   Cardiovascular:  Negative for palpitations.   Neurological:  Positive for tremors.   Psychiatric/Behavioral:  Negative for decreased concentration.      OBJECTIVE:  Vitals:    08/29/23 1551   BP: 112/77   BP Location: Left arm   Patient Position: Sitting   Pulse: 86   Temp: 96.7 øF (35.9 øC)   TempSrc: Temporal   SpO2: 96%   Weight: 122 kg (270 lb)   Height: 195.6 cm (77\")     No results found.   Body mass index is 32.02 kg/mý.  No LMP for male patient.    Physical Exam  Vitals and nursing note reviewed.   Constitutional:       General: He is not in acute distress.     Appearance: Normal appearance. He is normal weight.   HENT:      Head: Normocephalic.   Cardiovascular:      Rate and Rhythm: Normal rate and regular rhythm.      Heart sounds: Normal heart sounds. No murmur heard.  Pulmonary:      Effort: Pulmonary effort is normal.      Breath sounds: Normal breath sounds. No wheezing.   Neurological:      Mental Status: He is alert and oriented to person, place, and time.   Psychiatric:         Mood and Affect: Mood normal.         Thought Content: Thought content normal.         Judgment: Judgment normal.       Procedures    No visits with results within 30 Day(s) from this visit.   Latest known visit with results is:   Office Visit on 01/16/2023   Component Date Value Ref Range Status    Glucose 01/16/2023 81  65 - 99 mg/dL Final    BUN 01/16/2023 14  6 - 20 mg/dL Final    Creatinine 01/16/2023 0.97  0.76 - 1.27 mg/dL Final    Sodium 01/16/2023 142  136 - 145 mmol/L Final    Potassium 01/16/2023 4.5  3.5 - 5.2 mmol/L Final    Chloride 01/16/2023 " 104  98 - 107 mmol/L Final    CO2 01/16/2023 31.0 (H)  22.0 - 29.0 mmol/L Final    Calcium 01/16/2023 9.1  8.6 - 10.5 mg/dL Final    Total Protein 01/16/2023 6.8  6.0 - 8.5 g/dL Final    Albumin 01/16/2023 4.2  3.5 - 5.2 g/dL Final    ALT (SGPT) 01/16/2023 30  1 - 41 U/L Final    AST (SGOT) 01/16/2023 25  1 - 40 U/L Final    Alkaline Phosphatase 01/16/2023 52  39 - 117 U/L Final    Total Bilirubin 01/16/2023 0.4  0.0 - 1.2 mg/dL Final    Globulin 01/16/2023 2.6  gm/dL Final    A/G Ratio 01/16/2023 1.6  g/dL Final    BUN/Creatinine Ratio 01/16/2023 14.4  7.0 - 25.0 Final    Anion Gap 01/16/2023 7.0  5.0 - 15.0 mmol/L Final    eGFR 01/16/2023 101.2  >60.0 mL/min/1.73 Final    National Kidney Foundation and American Society of Nephrology (ASN) Task Force recommended calculation based on the Chronic Kidney Disease Epidemiology Collaboration (CKD-EPI) equation refit without adjustment for race.    Total Cholesterol 01/16/2023 175  0 - 200 mg/dL Final    Triglycerides 01/16/2023 140  0 - 150 mg/dL Final    HDL Cholesterol 01/16/2023 43  40 - 60 mg/dL Final    LDL Cholesterol  01/16/2023 107 (H)  0 - 100 mg/dL Final    VLDL Cholesterol 01/16/2023 25  5 - 40 mg/dL Final    LDL/HDL Ratio 01/16/2023 2.42   Final    Amphetamine Screen, Urine 01/16/2023 Positive (A)  Negative Final    AMP INTERNAL CONTROL 01/16/2023 Passed  Passed Final    Barbiturates Screen, Urine 01/16/2023 Negative  Negative Final    BARBITURATE INTERNAL CONTROL 01/16/2023 Passed  Passed Final    Buprenorphine, Screen, Urine 01/16/2023 Negative  Negative Final    BUPRENORPHINE INTERNAL CONTROL 01/16/2023 Passed  Passed Final    Benzodiazepine Screen, Urine 01/16/2023 Negative  Negative Final    BENZODIAZEPINE INTERNAL CONTROL 01/16/2023 Passed  Passed Final    Cocaine Screen, Urine 01/16/2023 Negative  Negative Final    COCAINE INTERNAL CONTROL 01/16/2023 Passed  Passed Final    MDMA (ECSTASY) 01/16/2023 Negative  Negative Final    MDMA (ECSTASY) INTERNAL  CONTROL 01/16/2023 Passed  Passed Final    Methamphetamine, Ur 01/16/2023 Negative  Negative Final    METHAMPHETAMINE INTERNAL CONTROL 01/16/2023 Passed  Passed Final    Methadone Screen, Urine 01/16/2023 Negative  Negative Final    METHADONE INTERNAL CONTROL 01/16/2023 Passed  Passed Final    Opiate Screen 01/16/2023 Negative  Negative Final    OPIATES INTERNAL CONTROL 01/16/2023 Passed  Passed Final    Oxycodone Screen, Urine 01/16/2023 Negative  Negative Final    OXYCODONE INTERNAL CONTROL 01/16/2023 Passed  Passed Final    Phencyclidine (PCP), Urine 01/16/2023 Negative  Negative Final    PHENCYCLIDINE INTERNAL CONTROL 01/16/2023 Passed  Passed Final    THC, Screen, Urine 01/16/2023 Negative  Negative Final    THC INTERNAL CONTROL 01/16/2023 Passed  Passed Final    Lot Number 01/16/2023 [id number]   Final    Expiration Date 01/16/2023 01/31/2024   Final    Hepatitis C Ab 01/16/2023 Non-Reactive  Non-Reactive Final       ASSESSMENT/ PLAN:    Diagnoses and all orders for this visit:    1. Essential hypertension (Primary)  Assessment & Plan:  His blood pressure is good here today.  We will continue his current meds and get his annual labs.      2. Annual physical exam  Assessment & Plan:  Overall he is doing well.  We will get updated annual labs requested by his employer.    Orders:  -     lisdexamfetamine (Vyvanse) 50 MG capsule; Take 1 capsule by mouth Every Morning  Dispense: 30 capsule; Refill: 0  -     Comprehensive Metabolic Panel  -     Lipid Panel  -     Hemoglobin A1c    3. Attention deficit hyperactivity disorder (ADHD), predominantly inattentive type  -     lisdexamfetamine (Vyvanse) 50 MG capsule; Take 1 capsule by mouth Every Morning  Dispense: 30 capsule; Refill: 0        Orders Placed Today:     New Medications Ordered This Visit   Medications    lisdexamfetamine (Vyvanse) 50 MG capsule     Sig: Take 1 capsule by mouth Every Morning     Dispense:  30 capsule     Refill:  0        Management Plan:      An After Visit Summary was printed and given to the patient at discharge.    Follow-up: Return in about 4 weeks (around 9/26/2023).    Ed Myers DO 8/29/2023 16:26 EDT  This note was electronically signed.

## 2023-10-02 ENCOUNTER — OFFICE VISIT (OUTPATIENT)
Dept: FAMILY MEDICINE CLINIC | Facility: CLINIC | Age: 43
End: 2023-10-02
Payer: COMMERCIAL

## 2023-10-02 VITALS
SYSTOLIC BLOOD PRESSURE: 125 MMHG | TEMPERATURE: 97 F | DIASTOLIC BLOOD PRESSURE: 78 MMHG | HEART RATE: 72 BPM | OXYGEN SATURATION: 98 % | WEIGHT: 261 LBS | HEIGHT: 77 IN | BODY MASS INDEX: 30.82 KG/M2

## 2023-10-02 DIAGNOSIS — Z00.00 ANNUAL PHYSICAL EXAM: ICD-10-CM

## 2023-10-02 DIAGNOSIS — F90.0 ATTENTION DEFICIT HYPERACTIVITY DISORDER (ADHD), PREDOMINANTLY INATTENTIVE TYPE: Primary | ICD-10-CM

## 2023-10-02 RX ORDER — LISDEXAMFETAMINE DIMESYLATE CAPSULES 50 MG/1
50 CAPSULE ORAL EVERY MORNING
Qty: 90 CAPSULE | Refills: 0 | Status: SHIPPED | OUTPATIENT
Start: 2023-10-02

## 2023-10-02 NOTE — PROGRESS NOTES
Chief Complaint   Patient presents with    Follow-up     4 week / new medication review         Subjective     Shiva Varela  has a past medical history of Callus, Essential hypertension (04/30/2019), and Ulnar neuropathy at elbow of left upper extremity (09/10/2020).    ADD-overall he is doing well with the switch to the Vyvanse.  Some of his bothersome symptoms are really not significantly any better nor any worse.  His attention focus and concentration remains good.      PHQ-2 Depression Screening  Little interest or pleasure in doing things?     Feeling down, depressed, or hopeless?     PHQ-2 Total Score     PHQ-9 Depression Screening  Little interest or pleasure in doing things?     Feeling down, depressed, or hopeless?     Trouble falling or staying asleep, or sleeping too much?     Feeling tired or having little energy?     Poor appetite or overeating?     Feeling bad about yourself - or that you are a failure or have let yourself or your family down?     Trouble concentrating on things, such as reading the newspaper or watching television?     Moving or speaking so slowly that other people could have noticed? Or the opposite - being so fidgety or restless that you have been moving around a lot more than usual?     Thoughts that you would be better off dead, or of hurting yourself in some way?     PHQ-9 Total Score     If you checked off any problems, how difficult have these problems made it for you to do your work, take care of things at home, or get along with other people?       Allergies   Allergen Reactions    Bee Venom Unknown - Low Severity    Codeine Unknown - Low Severity    Latex Unknown - Low Severity     Persistent Contact        Prior to Admission medications    Medication Sig Start Date End Date Taking? Authorizing Provider   ergocalciferol (ERGOCALCIFEROL) 1.25 MG (99053 UT) capsule vitamin d 41021 unit caps   Yes Provider, MD Ramona   fluticasone (FLONASE) 50 MCG/ACT nasal spray SPRAY  1 SPRAY INTO EACH NOSTRIL EVERY DAY 9/13/22  Yes Ed Myers DO   lisdexamfetamine (Vyvanse) 50 MG capsule Take 1 capsule by mouth Every Morning 8/29/23  Yes Ed Myers DO   metoprolol succinate XL (TOPROL-XL) 100 MG 24 hr tablet TAKE 1 TABLET DAILY 8/14/23  Yes Ed Myers DO   omeprazole (priLOSEC) 20 MG capsule TAKE 1 CAPSULE DAILY 8/14/23  Yes Ed Myers DO   sertraline (ZOLOFT) 100 MG tablet TAKE 1 TABLET DAILY 12/15/22  Yes Ed Myers DO        Patient Active Problem List   Diagnosis    Attention deficit hyperactivity disorder (ADHD)    Essential hypertension    Major depressive disorder, recurrent episode, in full remission    Difficulty urinating    Benign prostatic hyperplasia with weak urinary stream    Spondylolisthesis at L5-S1 level    Abdominal hernia    Depressive disorder    Eczema    Mixed hyperlipidemia    Ulnar neuropathy at elbow of left upper extremity    Vitamin D deficiency    Insect bite of right ankle    Annual physical exam    Benign essential tremor    Gastroesophageal reflux disease without esophagitis    Skin pustule    Bilateral hip pain        Past Surgical History:   Procedure Laterality Date    APPENDECTOMY      HAND SURGERY      HERNIA REPAIR      KNEE ACL RECONSTRUCTION Left 2000    KNEE MENISCECTOMY  2015    lateral    WISDOM TOOTH EXTRACTION         Social History     Socioeconomic History    Marital status: Single   Tobacco Use    Smoking status: Never    Smokeless tobacco: Never   Vaping Use    Vaping Use: Never used   Substance and Sexual Activity    Alcohol use: Never    Drug use: Never    Sexual activity: Defer       Family History   Problem Relation Age of Onset    Heart disease Paternal Grandmother     Heart disease Paternal Grandfather     Cancer Paternal Grandfather     Diabetes Other        Family history, surgical history, past medical history, Allergies and meds reviewed with patient today and updated  "in [facility] EMR.     ROS:  Review of Systems   Constitutional:  Negative for appetite change and unexpected weight loss.   Cardiovascular:  Negative for palpitations.   Neurological:  Positive for tremors. Negative for headache.   Psychiatric/Behavioral:  Negative for decreased concentration. The patient is nervous/anxious.      OBJECTIVE:  Vitals:    10/02/23 1453   BP: 125/78   BP Location: Left arm   Patient Position: Sitting   Pulse: 72   Temp: 97 °F (36.1 °C)   SpO2: 98%   Weight: 118 kg (261 lb)   Height: 195.6 cm (77\")     No results found.   Body mass index is 30.95 kg/m².  No LMP for male patient.    Physical Exam  Vitals and nursing note reviewed.   Constitutional:       General: He is not in acute distress.     Appearance: Normal appearance. He is normal weight.   HENT:      Head: Normocephalic.   Cardiovascular:      Rate and Rhythm: Normal rate and regular rhythm.      Pulses: Normal pulses.      Heart sounds: Normal heart sounds. No murmur heard.  Pulmonary:      Effort: Pulmonary effort is normal.      Breath sounds: Normal breath sounds. No wheezing, rhonchi or rales.   Skin:     General: Skin is warm and dry.      Coloration: Skin is not jaundiced.      Findings: No rash.   Neurological:      General: No focal deficit present.      Mental Status: He is alert and oriented to person, place, and time.   Psychiatric:         Mood and Affect: Mood normal.         Thought Content: Thought content normal.         Judgment: Judgment normal.       Procedures    No visits with results within 30 Day(s) from this visit.   Latest known visit with results is:   Office Visit on 08/29/2023   Component Date Value Ref Range Status    Glucose 08/29/2023 90  65 - 99 mg/dL Final    BUN 08/29/2023 14  6 - 20 mg/dL Final    Creatinine 08/29/2023 0.92  0.76 - 1.27 mg/dL Final    Sodium 08/29/2023 140  136 - 145 mmol/L Final    Potassium 08/29/2023 4.4  3.5 - 5.2 mmol/L Final    Chloride 08/29/2023 103  98 - 107 mmol/L Final    " CO2 08/29/2023 28.9  22.0 - 29.0 mmol/L Final    Calcium 08/29/2023 9.3  8.6 - 10.5 mg/dL Final    Total Protein 08/29/2023 7.1  6.0 - 8.5 g/dL Final    Albumin 08/29/2023 4.3  3.5 - 5.2 g/dL Final    ALT (SGPT) 08/29/2023 18  1 - 41 U/L Final    AST (SGOT) 08/29/2023 22  1 - 40 U/L Final    Alkaline Phosphatase 08/29/2023 49  39 - 117 U/L Final    Total Bilirubin 08/29/2023 0.4  0.0 - 1.2 mg/dL Final    Globulin 08/29/2023 2.8  gm/dL Final    A/G Ratio 08/29/2023 1.5  g/dL Final    BUN/Creatinine Ratio 08/29/2023 15.2  7.0 - 25.0 Final    Anion Gap 08/29/2023 8.1  5.0 - 15.0 mmol/L Final    eGFR 08/29/2023 107.8  >60.0 mL/min/1.73 Final    Total Cholesterol 08/29/2023 159  0 - 200 mg/dL Final    Triglycerides 08/29/2023 113  0 - 150 mg/dL Final    HDL Cholesterol 08/29/2023 40  40 - 60 mg/dL Final    LDL Cholesterol  08/29/2023 98  0 - 100 mg/dL Final    VLDL Cholesterol 08/29/2023 21  5 - 40 mg/dL Final    LDL/HDL Ratio 08/29/2023 2.41   Final    Hemoglobin A1C 08/29/2023 5.40  4.80 - 5.60 % Final       ASSESSMENT/ PLAN:    Diagnoses and all orders for this visit:    1. Attention deficit hyperactivity disorder (ADHD), predominantly inattentive type (Primary)  -     lisdexamfetamine (Vyvanse) 50 MG capsule; Take 1 capsule by mouth Every Morning  Dispense: 90 capsule; Refill: 0    2. Annual physical exam  -     lisdexamfetamine (Vyvanse) 50 MG capsule; Take 1 capsule by mouth Every Morning  Dispense: 90 capsule; Refill: 0               Orders Placed Today:     New Medications Ordered This Visit   Medications    lisdexamfetamine (Vyvanse) 50 MG capsule     Sig: Take 1 capsule by mouth Every Morning     Dispense:  90 capsule     Refill:  0        Management Plan:     An After Visit Summary was printed and given to the patient at discharge.    Follow-up: Return in about 3 months (around 1/2/2024) for Recheck.    Ed Myers DO 10/2/2023 15:17 EDT  This note was electronically signed.

## 2023-10-02 NOTE — ASSESSMENT & PLAN NOTE
Overall he is doing well with the change of the Vyvanse, although his bothersome symptoms are really not significantly improved.  He does overall seem to like the Vyvanse a little bit better than the Adderall we will stay with this for now

## 2023-10-20 ENCOUNTER — TELEPHONE (OUTPATIENT)
Dept: FAMILY MEDICINE CLINIC | Facility: CLINIC | Age: 43
End: 2023-10-20

## 2023-10-20 NOTE — TELEPHONE ENCOUNTER
Caller: Shiva Varela    Relationship: Self    Best call back number: [phone]     What is the best time to reach you: ANY    Who are you requesting to speak with (clinical staff, provider,  specific staff member): CLINICAL STAFF    What was the call regarding: PATIENT CALLED STATING THAT HE WOULD LIKE TO KNOW WHO DR SAENZ WOULD RECOMMEND AS FAR AS LOCAL NEUROLOGISTS IN [place].

## 2023-10-23 DIAGNOSIS — G56.22 ULNAR NEUROPATHY AT ELBOW OF LEFT UPPER EXTREMITY: Primary | ICD-10-CM

## 2023-11-08 ENCOUNTER — TELEPHONE (OUTPATIENT)
Dept: FAMILY MEDICINE CLINIC | Facility: CLINIC | Age: 43
End: 2023-11-08
Payer: COMMERCIAL

## 2023-11-08 NOTE — TELEPHONE ENCOUNTER
Pt stated that he thought he took a double dose of his medication .   Omeprazole 20mg  Metoprolol 100mg   Vyvanse 50mg      is not in office today so I spoke with Scarlet Mike . She advised pt to monitor his heart rate and BP . I advised pt that if heis heart rate gets to high or BP gets to low that he will need to go to ED to be evaluated .

## 2023-11-14 ENCOUNTER — OFFICE VISIT (OUTPATIENT)
Dept: NEUROLOGY | Facility: CLINIC | Age: 43
End: 2023-11-14
Payer: COMMERCIAL

## 2023-11-14 ENCOUNTER — PATIENT ROUNDING (BHMG ONLY) (OUTPATIENT)
Dept: NEUROLOGY | Facility: CLINIC | Age: 43
End: 2023-11-14
Payer: COMMERCIAL

## 2023-11-14 VITALS
BODY MASS INDEX: 30.94 KG/M2 | DIASTOLIC BLOOD PRESSURE: 82 MMHG | HEIGHT: 77 IN | SYSTOLIC BLOOD PRESSURE: 126 MMHG | HEART RATE: 76 BPM | WEIGHT: 262 LBS

## 2023-11-14 DIAGNOSIS — G25.0 ESSENTIAL TREMOR: Primary | ICD-10-CM

## 2023-11-14 PROCEDURE — 99203 OFFICE O/P NEW LOW 30 MIN: CPT | Performed by: PSYCHIATRY & NEUROLOGY

## 2023-11-14 RX ORDER — PRIMIDONE 50 MG/1
TABLET ORAL
Qty: 300 TABLET | Refills: 2 | Status: SHIPPED | OUTPATIENT
Start: 2023-11-14

## 2023-11-14 NOTE — ASSESSMENT & PLAN NOTE
Discussed with him that he has essential tremor and I will start him on primidone 50 mg.  He is to cut it in half and to take 1/2 tablet nightly for 2 days.  Thereafter he is to increase it by 1 tablet nightly every 3 days until his tremors improve or is taken up to 5 tablets twice a day at 7:30 AM and 4 PM.  Explained to him the titration schedule and gave him a piece of paper and he went over it and understood what the instructions was.  He is to stay at the lowest dose that controls his tremor.  I discussed with him the adverse effects including nausea, feeling like he is intoxicated, sleepy.  I will see him again in 6 to 7 weeks time for follow-up.  Thank you for letting me participate in his care.

## 2023-11-14 NOTE — PROGRESS NOTES
"Chief Complaint  Tremors (BUE)    Subjective          Shiva Varela is a 43 y.o. male who presents to [facility]  History of Present Illness  43-year-old man evaluated for tremor.  He states that he started having tremor when he was 20 years old and he got more noticeable at age 32 years old.  Is gotten worse in the last 5 years.  He is affected when he is typing especially and weightlifting.  The tremor gets worse when he is stressed out.  He states it affects his dexterity.  Sometimes it happens when he is cooking and repairing dinner.  His father has tremor.  It is only in his hands.  Sometimes his right arm will tremor when he leans head against something and only happens once.    Objective   Vital Signs:   /82 (BP Location: Right arm, Patient Position: Sitting, Cuff Size: Adult)   Pulse 76   Ht 195.6 cm (77.01\")   Wt 119 kg (262 lb)   BMI 31.06 kg/m²     Physical Exam   He is alert, fluent, phasic, follows commands well.  Optic is a normal, visual fields are full, EOMs full all directions gaze, facial strength is full, soft palate elevation and tongue are normal.  There is no weakness of the upper or lower extremities and vision muscle testing.  Fine finger movements are intact.  Reflexes are normoactive and symmetrical in the biceps triceps, patellar's and ankles not tested.  Cerebellar testing is intact.  Station gait is able to tiptoe, heel walk without difficulty armswing is normal and turning is intact.  There is no tremor.  There is a mild tremor noted on Archimedes spiral as well as transferring water from 1 cup to the next as well as try to drink out of a cup.  There is no rigidity or cogwheeling.  Heart is regular and rhythm normal in rate.   Assessment and Plan  Diagnoses and all orders for this visit:    1. Essential tremor (Primary)  Assessment & Plan:  Discussed with him that he has essential tremor and I will start him on primidone 50 mg.  He " is to cut it in half and to take 1/2 tablet nightly for 2 days.  Thereafter he is to increase it by 1 tablet nightly every 3 days until his tremors improve or is taken up to 5 tablets twice a day at 7:30 AM and 4 PM.  Explained to him the titration schedule and gave him a piece of paper and he went over it and understood what the instructions was.  He is to stay at the lowest dose that controls his tremor.  I discussed with him the adverse effects including nausea, feeling like he is intoxicated, sleepy.  I will see him again in 6 to 7 weeks time for follow-up.  Thank you for letting me participate in his care.      Other orders  -     primidone (MYSOLINE) 50 MG tablet; Titrate as directed and increase every 3 days up to 5 tablets twice a day.  Dispense: 300 tablet; Refill: 2         Total time spent with the patient and coordinating patient care was 35 minutes.    Follow Up  No follow-ups on file.  Patient was given instructions and counseling regarding his condition or for health maintenance advice. Please see specific information pulled into the AVS if appropriate.

## 2023-12-04 ENCOUNTER — OFFICE VISIT (OUTPATIENT)
Dept: FAMILY MEDICINE CLINIC | Facility: CLINIC | Age: 43
End: 2023-12-04
Payer: COMMERCIAL

## 2023-12-04 VITALS
DIASTOLIC BLOOD PRESSURE: 76 MMHG | SYSTOLIC BLOOD PRESSURE: 124 MMHG | TEMPERATURE: 98.4 F | HEIGHT: 77 IN | HEART RATE: 78 BPM | BODY MASS INDEX: 31.67 KG/M2 | WEIGHT: 268.2 LBS | OXYGEN SATURATION: 98 %

## 2023-12-04 DIAGNOSIS — R39.12 WEAK URINE STREAM: Primary | ICD-10-CM

## 2023-12-04 LAB
ALBUMIN SERPL-MCNC: 4.2 G/DL (ref 3.5–5.2)
ALBUMIN/GLOB SERPL: 1.7 G/DL
ALP SERPL-CCNC: 51 U/L (ref 39–117)
ALT SERPL W P-5'-P-CCNC: 36 U/L (ref 1–41)
ANION GAP SERPL CALCULATED.3IONS-SCNC: 9.1 MMOL/L (ref 5–15)
AST SERPL-CCNC: 27 U/L (ref 1–40)
BILIRUB BLD-MCNC: NEGATIVE MG/DL
BILIRUB SERPL-MCNC: 0.4 MG/DL (ref 0–1.2)
BUN SERPL-MCNC: 15 MG/DL (ref 6–20)
BUN/CREAT SERPL: 14.2 (ref 7–25)
CALCIUM SPEC-SCNC: 9.3 MG/DL (ref 8.6–10.5)
CHLORIDE SERPL-SCNC: 104 MMOL/L (ref 98–107)
CLARITY, POC: CLEAR
CO2 SERPL-SCNC: 27.9 MMOL/L (ref 22–29)
COLOR UR: YELLOW
CREAT SERPL-MCNC: 1.06 MG/DL (ref 0.76–1.27)
EGFRCR SERPLBLD CKD-EPI 2021: 90.4 ML/MIN/1.73
EXPIRATION DATE: NORMAL
GLOBULIN UR ELPH-MCNC: 2.5 GM/DL
GLUCOSE SERPL-MCNC: 85 MG/DL (ref 65–99)
GLUCOSE UR STRIP-MCNC: NEGATIVE MG/DL
KETONES UR QL: NEGATIVE
LEUKOCYTE EST, POC: NEGATIVE
Lab: NORMAL
NITRITE UR-MCNC: NEGATIVE MG/ML
PH UR: 5.5 [PH] (ref 5–8)
POTASSIUM SERPL-SCNC: 4.1 MMOL/L (ref 3.5–5.2)
PROT SERPL-MCNC: 6.7 G/DL (ref 6–8.5)
PROT UR STRIP-MCNC: NEGATIVE MG/DL
PSA SERPL-MCNC: 0.53 NG/ML (ref 0–4)
RBC # UR STRIP: NEGATIVE /UL
SODIUM SERPL-SCNC: 141 MMOL/L (ref 136–145)
SP GR UR: 1.03 (ref 1–1.03)
UROBILINOGEN UR QL: NORMAL

## 2023-12-04 PROCEDURE — 84153 ASSAY OF PSA TOTAL: CPT | Performed by: NURSE PRACTITIONER

## 2023-12-04 PROCEDURE — 80053 COMPREHEN METABOLIC PANEL: CPT | Performed by: NURSE PRACTITIONER

## 2023-12-04 RX ORDER — TERAZOSIN 2 MG/1
2 CAPSULE ORAL NIGHTLY
Qty: 90 CAPSULE | Refills: 1 | Status: SHIPPED | OUTPATIENT
Start: 2023-12-04

## 2023-12-04 NOTE — PROGRESS NOTES
Chief Complaint  prostate concerns (Weak urine stream, stopping and starting, unable to void- has been on flomax for 3 months and causes dizzy spells)    Subjective          Shiva Varela is a 43 y.o. male who presents to [facility]    History of Present Illness  Weak urine stream, having a hard time finishing, unable to fully empty, during the night he has to get up at least one time, has some dribbling and a split stream. Was on flomax for the same issues over a year ago but he got super dizzy. States he just put up with up but now its an issue again. States sometimes during bowel movements semen comes out too even before his fall in park. States he feels like he felt pain in his prostate.     Concerned about pain to the left of his tailbone since he fell park 9/15/23. States it really bothers him when he goes to have a bowel movement     PHQ-2 Total Score:     PHQ-9 Total Score:          Review of Systems   Genitourinary:  Positive for decreased urine volume, difficulty urinating, dysuria and urgency. Negative for hematuria, scrotal swelling and testicular pain.          Medical History: has a past medical history of Callus, Essential hypertension (04/30/2019), and Ulnar neuropathy at elbow of left upper extremity (09/10/2020).     Surgical History: has a past surgical history that includes Anterior cruciate ligament repair (Left, 2000); Appendectomy; Hand surgery; Hernia repair; Knee Meniscectomy (2015); and [place] tooth extraction.     Family History: family history includes Cancer in his paternal grandfather; Diabetes in an other family member; Heart disease in his paternal grandfather and paternal grandmother.     Social History: reports that he has never smoked. He has never been exposed to tobacco smoke. He has never used smokeless tobacco. He reports that he does not drink alcohol and does not use drugs.    Allergies: Bee venom, Codeine, and Latex      Health Maintenance  "Due   Topic Date Due    TDAP/TD VACCINES (1 - Tdap) Never done    ANNUAL PHYSICAL  07/28/2023    COVID-19 Vaccine (3 - 2023-24 season) 09/01/2023            Current Outpatient Medications:     ergocalciferol (ERGOCALCIFEROL) 1.25 MG (18364 UT) capsule, vitamin d 88043 unit caps, Disp: , Rfl:     fluticasone (FLONASE) 50 MCG/ACT nasal spray, SPRAY 1 SPRAY INTO EACH NOSTRIL EVERY DAY, Disp: 48 mL, Rfl: 5    lisdexamfetamine (Vyvanse) 50 MG capsule, Take 1 capsule by mouth Every Morning, Disp: 90 capsule, Rfl: 0    metoprolol succinate XL (TOPROL-XL) 100 MG 24 hr tablet, TAKE 1 TABLET DAILY, Disp: 90 tablet, Rfl: 1    omeprazole (priLOSEC) 20 MG capsule, TAKE 1 CAPSULE DAILY, Disp: 90 capsule, Rfl: 1    primidone (MYSOLINE) 50 MG tablet, Titrate as directed and increase every 3 days up to 5 tablets twice a day., Disp: 300 tablet, Rfl: 2    sertraline (ZOLOFT) 100 MG tablet, TAKE 1 TABLET DAILY (Patient taking differently: Take 1 tablet by mouth Daily.), Disp: 90 tablet, Rfl: 3    terazosin (HYTRIN) 2 MG capsule, Take 1 capsule by mouth Every Night., Disp: 90 capsule, Rfl: 1      Immunization History   Administered Date(s) Administered    COVID-19 (PFIZER) Purple Cap Monovalent 06/02/2021, 06/23/2021    Flublok 18+yrs 11/13/2022    Fluzone (or Fluarix & Flulaval for VFC) >6mos 11/01/2017    Fluzone Quad >6mos (Multi-dose) 10/01/2020    Influenza Injectable Mdck Pf Quad 09/25/2020    Influenza, Unspecified 09/25/2020         Objective       Vitals:    12/04/23 0718   BP: 124/76   BP Location: Left arm   Patient Position: Sitting   Cuff Size: Adult   Pulse: 78   Temp: 98.4 °F (36.9 °C)   TempSrc: Temporal   SpO2: 98%   Weight: 122 kg (268 lb 3.2 oz)   Height: 195.6 cm (77\")   PainSc:   4   PainLoc: Buttocks      Body mass index is 31.8 kg/m².   Wt Readings from Last 3 Encounters:   12/04/23 122 kg (268 lb 3.2 oz)   11/14/23 119 kg (262 lb)   10/02/23 118 kg (261 lb)      BP Readings from Last 3 Encounters:   12/04/23 " 124/76   11/14/23 126/82   10/02/23 125/78                Physical Exam  Vitals reviewed.   Constitutional:       Appearance: Normal appearance.   HENT:      Head: Normocephalic and atraumatic.   Skin:     General: Skin is warm and dry.   Neurological:      Mental Status: He is alert and oriented to person, place, and time.   Psychiatric:         Mood and Affect: Mood normal.         Behavior: Behavior normal.         Thought Content: Thought content normal.         Judgment: Judgment normal.             Result Review :       Common labs          1/16/2023    09:55 8/29/2023    16:39   Common Labs   Glucose 81  90    BUN 14  14    Creatinine 0.97  0.92    Sodium 142  140    Potassium 4.5  4.4    Chloride 104  103    Calcium 9.1  9.3    Albumin 4.2  4.3    Total Bilirubin 0.4  0.4    Alkaline Phosphatase 52  49    AST (SGOT) 25  22    ALT (SGPT) 30  18    Total Cholesterol 175  159    Triglycerides 140  113    HDL Cholesterol 43  40    LDL Cholesterol  107  98    Hemoglobin A1C  5.40                       Assessment and Plan        Diagnoses and all orders for this visit:    1. Weak urine stream (Primary)  Comments:  Suspected enlarged prostate.  Discussed options with patient.  He wants to try HYtrin and if doesn't work would like to have Prostate US done.  Orders:  -     POCT urinalysis dipstick, automated  -     PSA DIAGNOSTIC ONLY; Future  -     Comprehensive Metabolic Panel  -     US Prostate; Future  -     terazosin (HYTRIN) 2 MG capsule; Take 1 capsule by mouth Every Night.  Dispense: 90 capsule; Refill: 1  -     PSA DIAGNOSTIC ONLY    Declined prostate exam today.      Follow Up     Return for Follow up with Dr Myers.    Patient was given instructions and counseling regarding his condition or for health maintenance advice. Please see specific information pulled into the AVS if appropriate.     SHAAN Rose

## 2023-12-08 DIAGNOSIS — R39.12 WEAK URINE STREAM: Primary | ICD-10-CM

## 2023-12-11 ENCOUNTER — TELEPHONE (OUTPATIENT)
Dept: FAMILY MEDICINE CLINIC | Facility: CLINIC | Age: 43
End: 2023-12-11
Payer: COMMERCIAL

## 2023-12-11 NOTE — TELEPHONE ENCOUNTER
Caller: Shiva Varela    Relationship: Self    Best call back number: [phone]     What medication are you requesting: ANTIBIOTICS    What are your current symptoms: SINUS PRESSURE AND PAIN, DIZZINESS, DRAINAGE    How long have you been experiencing symptoms: 12/4/23    Have you had these symptoms before:    [x] Yes  [] No    Have you been treated for these symptoms before:   [x] Yes  [] No    If a prescription is needed, what is your preferred pharmacy and phone number: [facility]AC[facility] #[id number] - [place] - [address] [facility] [phone] [facility] [phone] FX     Additional notes: PATIENT WAS SEEN ON 12/4/23 AND SPOKE WITH MS. MENEZES REGARDING THESE SYMPTOMS. HE STATES THE SYMPTOMS HAS GOTTEN WORSE

## 2023-12-12 ENCOUNTER — OFFICE VISIT (OUTPATIENT)
Dept: FAMILY MEDICINE CLINIC | Facility: CLINIC | Age: 43
End: 2023-12-12
Payer: COMMERCIAL

## 2023-12-12 VITALS
SYSTOLIC BLOOD PRESSURE: 128 MMHG | HEART RATE: 91 BPM | DIASTOLIC BLOOD PRESSURE: 88 MMHG | WEIGHT: 269.8 LBS | OXYGEN SATURATION: 96 % | TEMPERATURE: 97.6 F | BODY MASS INDEX: 31.86 KG/M2 | HEIGHT: 77 IN

## 2023-12-12 DIAGNOSIS — J01.10 ACUTE FRONTAL SINUSITIS, RECURRENCE NOT SPECIFIED: Primary | ICD-10-CM

## 2023-12-12 PROCEDURE — 99213 OFFICE O/P EST LOW 20 MIN: CPT | Performed by: NURSE PRACTITIONER

## 2023-12-12 RX ORDER — AMOXICILLIN AND CLAVULANATE POTASSIUM 875; 125 MG/1; MG/1
1 TABLET, FILM COATED ORAL 2 TIMES DAILY
Qty: 14 TABLET | Refills: 0 | Status: SHIPPED | OUTPATIENT
Start: 2023-12-12 | End: 2023-12-19

## 2023-12-12 NOTE — PATIENT INSTRUCTIONS
Supportive care with rest, plenty of fluids, tylenol/ibuprofen for pain and/or fever as needed per label instructions. Resume your flonase. You may find a cool-mist humidifier helpful as well as throat lozenges, Chloraseptic spray and warm salt water gargles. Should you develop shortness of breath, chest pain or worsening of symptoms please go to the ER.

## 2023-12-15 DIAGNOSIS — R39.12 WEAK URINE STREAM: Primary | ICD-10-CM

## 2023-12-19 ENCOUNTER — HOSPITAL ENCOUNTER (OUTPATIENT)
Dept: GENERAL RADIOLOGY | Facility: HOSPITAL | Age: 43
Discharge: HOME | End: 2023-12-19
Admitting: NURSE PRACTITIONER
Payer: COMMERCIAL

## 2023-12-19 ENCOUNTER — OFFICE VISIT (OUTPATIENT)
Dept: FAMILY MEDICINE CLINIC | Facility: CLINIC | Age: 43
End: 2023-12-19
Payer: COMMERCIAL

## 2023-12-19 VITALS
SYSTOLIC BLOOD PRESSURE: 126 MMHG | HEIGHT: 77 IN | DIASTOLIC BLOOD PRESSURE: 78 MMHG | OXYGEN SATURATION: 95 % | TEMPERATURE: 97.8 F | WEIGHT: 272.4 LBS | BODY MASS INDEX: 32.16 KG/M2 | HEART RATE: 81 BPM

## 2023-12-19 DIAGNOSIS — F90.0 ATTENTION DEFICIT HYPERACTIVITY DISORDER (ADHD), PREDOMINANTLY INATTENTIVE TYPE: ICD-10-CM

## 2023-12-19 DIAGNOSIS — Z00.00 ANNUAL PHYSICAL EXAM: ICD-10-CM

## 2023-12-19 DIAGNOSIS — M53.3 COCCYX PAIN: ICD-10-CM

## 2023-12-19 DIAGNOSIS — M53.3 COCCYX PAIN: Primary | ICD-10-CM

## 2023-12-19 PROCEDURE — 99212 OFFICE O/P EST SF 10 MIN: CPT | Performed by: NURSE PRACTITIONER

## 2023-12-19 PROCEDURE — 72220 X-RAY EXAM SACRUM TAILBONE: CPT

## 2023-12-19 RX ORDER — LISDEXAMFETAMINE DIMESYLATE CAPSULES 50 MG/1
50 CAPSULE ORAL EVERY MORNING
Qty: 90 CAPSULE | Refills: 0 | Status: SHIPPED | OUTPATIENT
Start: 2023-12-19 | End: 2023-12-21 | Stop reason: SDUPTHER

## 2023-12-19 NOTE — PROGRESS NOTES
"Chief Complaint  Follow-up (TAIL BONE INJURY )    Subjective      Shiva Varela is a 43 year old male that presents to [facility] pt c/o tailbone pain on the left side. He states he had a fall back in September [place] the park.  He stepped backwards and slipped on a rock, landed on his bottom. Bending causes burning and aching pain. He denies  swelling or bruising but states it feels tender like a bruise. Not using heat or ice.   No numbness or tingling.       History of Present Illness    Current Outpatient Medications   Medication Instructions    amoxicillin-clavulanate (AUGMENTIN) 875-125 MG per tablet 1 tablet, Oral, 2 Times Daily    Chlorcyclizine-Pseudoephed 25-60 MG tablet 1 tablet, Oral, 3 Times Daily PRN    ergocalciferol (ERGOCALCIFEROL) 1.25 MG (62100 UT) capsule vitamin d 64981 unit caps    fluticasone (FLONASE) 50 MCG/ACT nasal spray SPRAY 1 SPRAY INTO EACH NOSTRIL EVERY DAY    lisdexamfetamine (VYVANSE) 50 mg, Oral, Every Morning    metoprolol succinate XL (TOPROL-XL) 100 MG 24 hr tablet TAKE 1 TABLET DAILY    omeprazole (priLOSEC) 20 MG capsule TAKE 1 CAPSULE DAILY    primidone (MYSOLINE) 50 MG tablet Titrate as directed and increase every 3 days up to 5 tablets twice a day.    sertraline (ZOLOFT) 100 MG tablet TAKE 1 TABLET DAILY    terazosin (HYTRIN) 2 mg, Oral, Nightly       The following portions of the patient's history were reviewed and updated as appropriate: allergies, current medications, past family history, past medical history, past social history, past surgical history, and problem list.    Objective   Vital Signs:   /78   Pulse 81   Temp 97.8 °F (36.6 °C)   Ht 195.6 cm (77\")   Wt 124 kg (272 lb 6.4 oz)   SpO2 95%   BMI 32.30 kg/m²     Wt Readings from Last 3 Encounters:   12/19/23 124 kg (272 lb 6.4 oz)   12/12/23 122 kg (269 lb 12.8 oz)   12/04/23 122 kg (268 lb 3.2 oz)     BP Readings from Last 3 Encounters:   12/19/23 " 126/78   12/12/23 128/88   12/04/23 124/76     Physical Exam  Vitals reviewed.   Constitutional:       Appearance: Normal appearance.   HENT:      Head: Normocephalic and atraumatic.   Eyes:      Conjunctiva/sclera: Conjunctivae normal.      Pupils: Pupils are equal, round, and reactive to light.   Pulmonary:      Effort: Pulmonary effort is normal.   Neurological:      Mental Status: He is alert.   Psychiatric:         Mood and Affect: Mood normal.         Behavior: Behavior normal.          Result Review :  The following data was reviewed by: SHAAN Kimble on 12/19/2023:            Lab Results (last 72 hours)       ** No results found for the last 72 hours. **             No Images in the past 120 days found..    Lab Results   Component Value Date    BILIRUBINUR Negative 12/04/2023       Procedures        Assessment and Plan   Diagnoses and all orders for this visit:    1. Coccyx pain (Primary)  -     XR Sacrum & Coccyx; Future                  There are no discontinued medications.       Follow Up   No follow-ups on file.  Patient was given instructions and counseling regarding his condition or for health maintenance advice. Please see specific information pulled into the AVS if appropriate.   You can use a heating pad and/or ice to the area. Voltaren gel directly on the painful area. Avoid pressure to the area, you can use a donut or wedge pillow.     SHAAN Kimble  12/19/23  13:54 EST

## 2023-12-21 DIAGNOSIS — Z00.00 ANNUAL PHYSICAL EXAM: ICD-10-CM

## 2023-12-21 DIAGNOSIS — F90.0 ATTENTION DEFICIT HYPERACTIVITY DISORDER (ADHD), PREDOMINANTLY INATTENTIVE TYPE: ICD-10-CM

## 2023-12-21 RX ORDER — LISDEXAMFETAMINE DIMESYLATE CAPSULES 50 MG/1
50 CAPSULE ORAL EVERY MORNING
Qty: 90 CAPSULE | Refills: 0 | Status: SHIPPED | OUTPATIENT
Start: 2023-12-21

## 2023-12-21 NOTE — TELEPHONE ENCOUNTER
Caller: Shiva Varela    Relationship: Self    Best call back number: [phone]     Requested Prescriptions:   Requested Prescriptions     Pending Prescriptions Disp Refills    lisdexamfetamine (Vyvanse) 50 MG capsule 90 capsule 0     Sig: Take 1 capsule by mouth Every Morning        Pharmacy where request should be sent: [facility] - [place] - [address] AT [address]AL TO [facility] - [phone]  - [phone]      Last office visit with prescribing clinician: 10/2/2023   Last telemedicine visit with prescribing clinician: Visit date not found   Next office visit with prescribing clinician: 1/4/2024     Additional details provided by patient: PRESCRIPTION WAS SENT TO WRONG PHARMACY. PLEASE SEND TO [facility]    Does the patient have less than a 3 day supply:  [] Yes  [x] No    Would you like a call back once the refill request has been completed: [] Yes [] No    If the office needs to give you a call back, can they leave a voicemail: [] Yes [] No    Demetria Knight Rep   12/21/23 09:36 EST

## 2024-01-18 ENCOUNTER — OFFICE VISIT (OUTPATIENT)
Dept: NEUROLOGY | Facility: CLINIC | Age: 44
End: 2024-01-18
Payer: COMMERCIAL

## 2024-01-18 VITALS
BODY MASS INDEX: 31.76 KG/M2 | HEART RATE: 71 BPM | HEIGHT: 77 IN | SYSTOLIC BLOOD PRESSURE: 112 MMHG | DIASTOLIC BLOOD PRESSURE: 70 MMHG | WEIGHT: 269 LBS

## 2024-01-18 DIAGNOSIS — G25.0 ESSENTIAL TREMOR: Primary | ICD-10-CM

## 2024-01-18 PROCEDURE — 99213 OFFICE O/P EST LOW 20 MIN: CPT | Performed by: PSYCHIATRY & NEUROLOGY

## 2024-01-18 NOTE — PROGRESS NOTES
"Chief Complaint  Med check  (Primidone/)    Subjective          Shiva Varela is a 43 y.o. male who presents to [facility]  History of Present Illness  43-year-old man here for follow-up of his essential tremor.  He states that he is much better on primidone 50 mg nightly.  He states that he can do activities of daily living.  He is wondering about reactive hypoglycemia that he read on the Internet.  He is also sleepy after he eats a meal.  He states that he does not have daytime somnolence otherwise and he feels refreshed when he wakes up in the morning.    Objective   Vital Signs:   /70 (BP Location: Left arm, Patient Position: Sitting, Cuff Size: Adult)   Pulse 71   Ht 195.6 cm (77.01\")   Wt 122 kg (269 lb)   BMI 31.89 kg/m²     Physical Exam   Alert, fluent, phasic, follows commands well.  There is mild tremor noted hands extended and finger-to-nose testing.  There is no significant tremor noted on Archimedes spiral.  Station and gait is unremarkable.        Assessment and Plan  Diagnoses and all orders for this visit:    1. Essential tremor (Primary)  Assessment & Plan:  He is to continue taking primidone 50 mg nightly.  I discussed with him that treatment of tremor is symptomatic that he can increase the medication to a higher dose.  I will see him again in 6 months time for follow-up.  Thank you for letting me participate in his care.           Total time spent with the patient and coordinating patient care was 25 minutes.    Follow Up  No follow-ups on file.  Patient was given instructions and counseling regarding his condition or for health maintenance advice. Please see specific information pulled into the AVS if appropriate.       "

## 2024-01-18 NOTE — ASSESSMENT & PLAN NOTE
He is to continue taking primidone 50 mg nightly.  I discussed with him that treatment of tremor is symptomatic that he can increase the medication to a higher dose.  I will see him again in 6 months time for follow-up.  Thank you for letting me participate in his care.

## 2024-02-12 RX ORDER — PRIMIDONE 50 MG/1
TABLET ORAL
Qty: 300 TABLET | Refills: 2 | Status: SHIPPED | OUTPATIENT
Start: 2024-02-12

## 2024-02-23 RX ORDER — SERTRALINE HYDROCHLORIDE 100 MG/1
100 TABLET, FILM COATED ORAL DAILY
Qty: 90 TABLET | Refills: 1 | Status: SHIPPED | OUTPATIENT
Start: 2024-02-23

## 2024-02-29 ENCOUNTER — TELEPHONE (OUTPATIENT)
Dept: FAMILY MEDICINE CLINIC | Facility: CLINIC | Age: 44
End: 2024-02-29
Payer: COMMERCIAL

## 2024-02-29 NOTE — TELEPHONE ENCOUNTER
PER BETTY GRANT, OUR REFERRAL SPECIALIST, WE DO NOT TEST FOR ADULT AUTISM IN OUR OFFICE. [facility] IN [place], [place] POSSIBLE CAN TEST YOU. THEIR PHONE NUMBER IS: [phone]. I WILL SEND A [employer] MESSAGE TO THE PATIENT.

## 2024-02-29 NOTE — TELEPHONE ENCOUNTER
Caller: Shiva Varela    Relationship to patient: Self    Best call back number: [phone]    Chief complaint: 3 MO F/U, WANTING TO BE TESTED FOR ADULT AUTISM     Type of visit: OFFICE     Requested date: ASAP     If rescheduling, when is the original appointment: 3.29.24    Additional notes: PATIENT SCHEDULED FIRST AVAILABLE IN [facility] BUT IS REQUESTING TO BE SEEN SOONER IF POSSIBLE.

## 2024-03-29 ENCOUNTER — OFFICE VISIT (OUTPATIENT)
Dept: FAMILY MEDICINE CLINIC | Facility: CLINIC | Age: 44
End: 2024-03-29
Payer: COMMERCIAL

## 2024-03-29 VITALS
BODY MASS INDEX: 31.17 KG/M2 | OXYGEN SATURATION: 100 % | HEIGHT: 77 IN | SYSTOLIC BLOOD PRESSURE: 115 MMHG | TEMPERATURE: 97.6 F | HEART RATE: 69 BPM | WEIGHT: 264 LBS | DIASTOLIC BLOOD PRESSURE: 72 MMHG

## 2024-03-29 DIAGNOSIS — R39.12 WEAK URINE STREAM: ICD-10-CM

## 2024-03-29 DIAGNOSIS — F90.0 ATTENTION DEFICIT HYPERACTIVITY DISORDER (ADHD), PREDOMINANTLY INATTENTIVE TYPE: ICD-10-CM

## 2024-03-29 DIAGNOSIS — E78.2 MIXED HYPERLIPIDEMIA: Primary | ICD-10-CM

## 2024-03-29 DIAGNOSIS — M25.551 BILATERAL HIP PAIN: ICD-10-CM

## 2024-03-29 DIAGNOSIS — M25.552 BILATERAL HIP PAIN: ICD-10-CM

## 2024-03-29 DIAGNOSIS — R20.9 SENSORY DISORDER: ICD-10-CM

## 2024-03-29 DIAGNOSIS — I10 ESSENTIAL HYPERTENSION: ICD-10-CM

## 2024-03-29 LAB
AMPHET+METHAMPHET UR QL: POSITIVE
AMPHETAMINE INTERNAL CONTROL: ABNORMAL
AMPHETAMINES UR QL: NEGATIVE
BARBITURATE INTERNAL CONTROL: ABNORMAL
BARBITURATES UR QL SCN: NEGATIVE
BENZODIAZ UR QL SCN: NEGATIVE
BENZODIAZEPINE INTERNAL CONTROL: ABNORMAL
BUPRENORPHINE INTERNAL CONTROL: ABNORMAL
BUPRENORPHINE SERPL-MCNC: NEGATIVE NG/ML
CANNABINOIDS SERPL QL: NEGATIVE
COCAINE INTERNAL CONTROL: ABNORMAL
COCAINE UR QL: NEGATIVE
EXPIRATION DATE: ABNORMAL
Lab: ABNORMAL
MDMA (ECSTASY) INTERNAL CONTROL: ABNORMAL
MDMA UR QL SCN: NEGATIVE
METHADONE INTERNAL CONTROL: ABNORMAL
METHADONE UR QL SCN: NEGATIVE
METHAMPHETAMINE INTERNAL CONTROL: ABNORMAL
MORPHINE INTERNAL CONTROL: ABNORMAL
MORPHINE/OPIATES SCREEN, URINE: NEGATIVE
OXYCODONE INTERNAL CONTROL: ABNORMAL
OXYCODONE UR QL SCN: NEGATIVE
PCP UR QL SCN: NEGATIVE
PHENCYCLIDINE INTERNAL CONTROL: ABNORMAL
THC INTERNAL CONTROL: ABNORMAL

## 2024-03-29 PROCEDURE — 80305 DRUG TEST PRSMV DIR OPT OBS: CPT | Performed by: FAMILY MEDICINE

## 2024-03-29 PROCEDURE — 99214 OFFICE O/P EST MOD 30 MIN: CPT | Performed by: FAMILY MEDICINE

## 2024-03-29 RX ORDER — TERAZOSIN 2 MG/1
2 CAPSULE ORAL NIGHTLY
Qty: 90 CAPSULE | Refills: 1 | Status: SHIPPED | OUTPATIENT
Start: 2024-03-29

## 2024-03-29 NOTE — ASSESSMENT & PLAN NOTE
Along with his sensory disorder he has other symptoms there is somewhat concerning for autism.  Will have him see psychiatry to see if he meets the diagnostic criteria.

## 2024-03-29 NOTE — ASSESSMENT & PLAN NOTE
He continues to have ongoing hip pain.  Will set him up for an MRI of his hips to evaluate further.

## 2024-03-29 NOTE — PROGRESS NOTES
Chief Complaint   Patient presents with    Follow-up     3 month   Requesting Autism Check   C/O bilateral hip pain  Requesting refill on Vyvanse    Hypertension    ADD    Hyperlipidemia        Subjective     Shiva Varela  has a past medical history of Callus, Essential hypertension (04/30/2019), and Ulnar neuropathy at elbow of left upper extremity (09/10/2020).    Hypertension-he does check his blood pressure at home intermittently.  He states his home blood pressures are typically good.  It is also good here today at 115/72.    Hyperlipidemia-he is not currently on any medication.  He does try to do a low-fat diet.    ADD-he states he is doing well with his medication at this time.  His focus concentration are good.  He is somewhat concerned that he has autism.  He tends to have a lot of sensory type of issues as well.  Typically sounds and touch.  He also has a lot of repetitive and structured activities that do make it concerning.    Hip pain-he continues to have some hip pain.  He notices this more after prolonged sitting.  Oftentimes after getting up and a twisting motion of his hip he will hear a popping sound.  His previous x-rays that were negative.            PHQ-2 Depression Screening  Little interest or pleasure in doing things?     Feeling down, depressed, or hopeless?     PHQ-2 Total Score     PHQ-9 Depression Screening  Little interest or pleasure in doing things?     Feeling down, depressed, or hopeless?     Trouble falling or staying asleep, or sleeping too much?     Feeling tired or having little energy?     Poor appetite or overeating?     Feeling bad about yourself - or that you are a failure or have let yourself or your family down?     Trouble concentrating on things, such as reading the newspaper or watching television?     Moving or speaking so slowly that other people could have noticed? Or the opposite - being so fidgety or restless that you have been moving around a lot more than  usual?     Thoughts that you would be better off dead, or of hurting yourself in some way?     PHQ-9 Total Score     If you checked off any problems, how difficult have these problems made it for you to do your work, take care of things at home, or get along with other people?       Allergies   Allergen Reactions    Bee Venom Unknown - Low Severity    Codeine Unknown - Low Severity    Latex Unknown - Low Severity     Persistent Contact        Prior to Admission medications    Medication Sig Start Date End Date Taking? Authorizing Provider   ergocalciferol (ERGOCALCIFEROL) 1.25 MG (01065 UT) capsule Take 2,000 Units by mouth Daily.   Yes ProviderRamona MD   fluticasone (FLONASE) 50 MCG/ACT nasal spray SPRAY 1 SPRAY INTO EACH NOSTRIL EVERY DAY 9/13/22  Yes Ed Myers DO   lisdexamfetamine (Vyvanse) 50 MG capsule Take 1 capsule by mouth Every Morning 12/21/23  Yes Ed Myers DO   metoprolol succinate XL (TOPROL-XL) 100 MG 24 hr tablet TAKE 1 TABLET DAILY 8/14/23  Yes Ed Myers DO   omeprazole (priLOSEC) 20 MG capsule TAKE 1 CAPSULE DAILY 8/14/23  Yes Ed Myers DO   primidone (MYSOLINE) 50 MG tablet Titrate as directed and increase every 3 days up to 5 tablets twice a day.  Patient taking differently: Take 1 tablet by mouth Every Night. Titrate as directed and increase every 3 days up to 5 tablets twice a day. 2/12/24  Yes Jeremy Warner MD   sertraline (ZOLOFT) 100 MG tablet Take 1 tablet by mouth Daily. 2/23/24  Yes Ed Myers DO   terazosin (HYTRIN) 2 MG capsule Take 1 capsule by mouth Every Night. 12/4/23  Yes Scarlet Mike APRN   Chlorcyclizine-Pseudoephed 25-60 MG tablet Take 1 tablet by mouth 3 (Three) Times a Day As Needed (congestion).  Patient not taking: Reported on 3/29/2024 12/12/23 3/29/24  Briana Díaz APRN        Patient Active Problem List   Diagnosis    Attention deficit hyperactivity disorder (ADHD)  "   Essential hypertension    Major depressive disorder, recurrent episode, in full remission    Difficulty urinating    Benign prostatic hyperplasia with weak urinary stream    Spondylolisthesis at L5-S1 level    Abdominal hernia    Depressive disorder    Eczema    Mixed hyperlipidemia    Ulnar neuropathy at elbow of left upper extremity    Vitamin D deficiency    Insect bite of right ankle    Annual physical exam    Essential tremor    Gastroesophageal reflux disease without esophagitis    Skin pustule    Bilateral hip pain    Sensory disorder        Past Surgical History:   Procedure Laterality Date    APPENDECTOMY      HAND SURGERY      HERNIA REPAIR      KNEE ACL RECONSTRUCTION Left 2000    KNEE MENISCECTOMY  2015    lateral    WISDOM TOOTH EXTRACTION         Social History     Socioeconomic History    Marital status: Single   Tobacco Use    Smoking status: Never     Passive exposure: Never    Smokeless tobacco: Never   Vaping Use    Vaping status: Never Used   Substance and Sexual Activity    Alcohol use: Never    Drug use: Never    Sexual activity: Defer       Family History   Problem Relation Age of Onset    Heart disease Paternal Grandmother     Heart disease Paternal Grandfather     Cancer Paternal Grandfather     Diabetes Other        Family history, surgical history, past medical history, Allergies and meds reviewed with patient today and updated in [employer] EMR.     ROS:  Review of Systems   Constitutional:  Negative for fatigue.   Respiratory:  Negative for cough, chest tightness, shortness of breath and wheezing.    Cardiovascular:  Negative for chest pain and palpitations.   Psychiatric/Behavioral:  Negative for decreased concentration.         (+) Sensory disorder       OBJECTIVE:  Vitals:    03/29/24 1011   BP: 115/72   BP Location: Left arm   Patient Position: Sitting   Pulse: 69   Temp: 97.6 °F (36.4 °C)   SpO2: 100%   Weight: 120 kg (264 lb)   Height: 195.6 cm (77.01\")     No results found.   Body " mass index is 31.3 kg/m².  No LMP for male patient.    The 10-year ASCVD risk score (Gopi HILLMAN, et al., 2019) is: 1.1%    Values used to calculate the score:      Age: 43 years      Sex: Male      Is Non- : No      Diabetic: No      Tobacco smoker: No      Systolic Blood Pressure: 115 mmHg      Is BP treated: Yes      HDL Cholesterol: 40 mg/dL      Total Cholesterol: 159 mg/dL     Physical Exam  Vitals and nursing note reviewed.   Constitutional:       General: He is not in acute distress.     Appearance: Normal appearance. He is normal weight.   HENT:      Head: Normocephalic.   Cardiovascular:      Rate and Rhythm: Normal rate and regular rhythm.      Heart sounds: Normal heart sounds. No murmur heard.  Pulmonary:      Effort: Pulmonary effort is normal.      Breath sounds: Normal breath sounds. No wheezing.   Neurological:      General: No focal deficit present.      Mental Status: He is alert and oriented to person, place, and time.   Psychiatric:         Mood and Affect: Mood normal.         Thought Content: Thought content normal.         Judgment: Judgment normal.         Procedures    No visits with results within 30 Day(s) from this visit.   Latest known visit with results is:   Office Visit on 12/04/2023   Component Date Value Ref Range Status    Color 12/04/2023 Yellow  Yellow, Straw, Dark Yellow, Claudia Final    Clarity, UA 12/04/2023 Clear  Clear Final    Specific Gravity  12/04/2023 1.030  1.005 - 1.030 Final    pH, Urine 12/04/2023 5.5  5.0 - 8.0 Final    Leukocytes 12/04/2023 Negative  Negative Final    Nitrite, UA 12/04/2023 Negative  Negative Final    Protein, POC 12/04/2023 Negative  Negative mg/dL Final    Glucose, UA 12/04/2023 Negative  Negative mg/dL Final    Ketones, UA 12/04/2023 Negative  Negative Final    Urobilinogen, UA 12/04/2023 0.2 E.U./dL  Normal, 0.2 E.U./dL Final    Bilirubin 12/04/2023 Negative  Negative Final    Blood, UA 12/04/2023 Negative  Negative Final     Lot Number 12/04/2023 [id number]   Final    Expiration Date 12/04/2023 4-2,024   Final    Glucose 12/04/2023 85  65 - 99 mg/dL Final    BUN 12/04/2023 15  6 - 20 mg/dL Final    Creatinine 12/04/2023 1.06  0.76 - 1.27 mg/dL Final    Sodium 12/04/2023 141  136 - 145 mmol/L Final    Potassium 12/04/2023 4.1  3.5 - 5.2 mmol/L Final    Chloride 12/04/2023 104  98 - 107 mmol/L Final    CO2 12/04/2023 27.9  22.0 - 29.0 mmol/L Final    Calcium 12/04/2023 9.3  8.6 - 10.5 mg/dL Final    Total Protein 12/04/2023 6.7  6.0 - 8.5 g/dL Final    Albumin 12/04/2023 4.2  3.5 - 5.2 g/dL Final    ALT (SGPT) 12/04/2023 36  1 - 41 U/L Final    AST (SGOT) 12/04/2023 27  1 - 40 U/L Final    Alkaline Phosphatase 12/04/2023 51  39 - 117 U/L Final    Total Bilirubin 12/04/2023 0.4  0.0 - 1.2 mg/dL Final    Globulin 12/04/2023 2.5  gm/dL Final    A/G Ratio 12/04/2023 1.7  g/dL Final    BUN/Creatinine Ratio 12/04/2023 14.2  7.0 - 25.0 Final    Anion Gap 12/04/2023 9.1  5.0 - 15.0 mmol/L Final    eGFR 12/04/2023 90.4  >60.0 mL/min/1.73 Final    PSA 12/04/2023 0.534  0.000 - 4.000 ng/mL Final       ASSESSMENT/ PLAN:    Diagnoses and all orders for this visit:    1. Mixed hyperlipidemia (Primary)  Assessment & Plan:   Will update his lipid profile with his labs.    Orders:  -     Comprehensive Metabolic Panel  -     Lipid Panel    2. Essential hypertension  Assessment & Plan:  His blood pressure is good here today.  Will continue his current meds and update his labs.    Orders:  -     Comprehensive Metabolic Panel  -     Lipid Panel    3. Attention deficit hyperactivity disorder (ADHD), predominantly inattentive type  Assessment & Plan:  He is doing well with his current medication.  Will continue that for now.    Orders:  -     POC Medline 12 Panel Urine Drug Screen    4. Sensory disorder  Assessment & Plan:  Along with his sensory disorder he has other symptoms there is somewhat concerning for autism.  Will have him see psychiatry to see if he  meets the diagnostic criteria.    Orders:  -     Ambulatory Referral to Psychiatry    5. Bilateral hip pain  Assessment & Plan:  He continues to have ongoing hip pain.  Will set him up for an MRI of his hips to evaluate further.    Orders:  -     MRI Hip Right Without Contrast; Future  -     MRI Hip Left Without Contrast; Future               Orders Placed Today:     No orders of the defined types were placed in this encounter.       Management Plan:     An After Visit Summary was printed and given to the patient at discharge.    Follow-up: Return in about 3 months (around 6/29/2024) for Recheck.    Ed Myers DO 3/29/2024 10:57 EDT  This note was electronically signed.

## 2024-04-01 NOTE — PROGRESS NOTES
"[facility]  Initial Evaluation    Referring Provider:  Ed Myers, DO  [address] [facility] [place] [address]    Chief Complaint: \"I had a poor work review\"    History of Present Illness: Shiva Varela is an insightful and intelligent 43 y.o. male who presents today for initial evaluation regarding ASD. Shiva presents unaccompanied in no acute distress and engages with me appropriately. Psychotropic regimen with which patient presents is described as sertraline and lisdexamfetamine.     History is positive for signs/symptoms suggestive of trouble concentrating, trouble completing tasks, making errors in routine tasks, issues remembering obligations, trouble with organization, fidgeting, and associated irritability/anxiety with these deficits. Patient concerned he may be on the autism spectrum and is desiring testing and diagnostic clarity. Patient voices: enduring social deficits and related issues with interactions, emotional processing and expression, restricted and intense interests-\"super focus\", proclivity to routine \"eating routines\" and emotional dysregulation with disruption thereto, stereotypies, and general executive barriers to functionality in modern society-' picking up on social cues, and difficulty \"reading people.\" Psychiatric screening is negative for pathognomonic history of TBI, violence, and suicidality.     I have counseled the patient with regard to diagnoses (will need formal neuro psych testing, information given and the recommended treatment regimen as documented below: I will assume prescriptive responsibility for nothing at this time.  Patient acknowledges the diagnoses per my rendered interpretation. Patient demonstrates awareness/understanding of viable alternatives for treatment as well as potential risks, benefits, and side effects associated with this regimen and is amenable to proceed in this fashion. "     Recommended lifestyle changes: 30 minutes of activity to increase HR 2-3 days weekly.    Psychiatric History:  Diagnoses: ADD, depression  Outpatient history: therapy  Inpatient history: none  Medication trials: sertraline and lisdexamfetamine   Other treatment modalities: Psychotherapy  Self harm: No history  Suicide attempts: No  Abuse or neglect: None    Substance Abuse History:   Types/methods/frequency: * none  Transtheoretical stage: none    Social History:  Residence: lives alone  Vocation: yes  Source of income: Employed  Last grade completed: college  Pertinent developmental history: ADHD  Pertinent legal history: No history   Hobbies/interests: reading  Rastafarian: N/A  Exercise:n/a.   Dietary habits: no pertinent issues   Sleep hygiene: no pertinent issues   Social habits: desires to be in a relationship  Sunlight: There are no concern for under-exposure.  Caffeine intake: no pertinent issues   Hydration habits: no pertinent issues    history: No    Social History     Socioeconomic History    Marital status: Single   Tobacco Use    Smoking status: Never     Passive exposure: Never    Smokeless tobacco: Never   Vaping Use    Vaping status: Never Used   Substance and Sexual Activity    Alcohol use: Never    Drug use: Never    Sexual activity: Defer     Access to Firearms: no    Tobacco use counseling/intervention: N/A, patient does not use tobaccoPHQ-9 Depression Screening  PHQ-9 Total Score: 4    Little interest or pleasure in doing things? 0-->not at all   Feeling down, depressed, or hopeless? 1-->several days   Trouble falling or staying asleep, or sleeping too much? 0-->not at all   Feeling tired or having little energy? 0-->not at all   Poor appetite or overeating? 0-->not at all   Feeling bad about yourself - or that you are a failure or have let yourself or your family down? 1-->several days   Trouble concentrating on things, such as reading the newspaper or watching television? 2-->more  than half the days   Moving or speaking so slowly that other people could have noticed? Or the opposite - being so fidgety or restless that you have been moving around a lot more than usual? 0-->not at all   Thoughts that you would be better off dead, or of hurting yourself in some way? 0-->not at all   PHQ-9 Total Score 4     INA-7  Feeling nervous, anxious or on edge: Several days  Not being able to stop or control worrying: Not at all  Worrying too much about different things: Several days  Trouble Relaxing: Not at all  Being so restless that it is hard to sit still: Not at all  Feeling afraid as if something awful might happen: Not at all  Becoming easily annoyed or irritable: Not at all  INA 7 Total Score: 2  If you checked any problems, how difficult have these problems made it for you to do your work, take care of things at home, or get along with other people: Not difficult at all    Problem List:  Patient Active Problem List   Diagnosis    Attention deficit hyperactivity disorder (ADHD)    Essential hypertension    Major depressive disorder, recurrent episode, in full remission    Difficulty urinating    Benign prostatic hyperplasia with weak urinary stream    Spondylolisthesis at L5-S1 level    Abdominal hernia    Depressive disorder    Eczema    Mixed hyperlipidemia    Ulnar neuropathy at elbow of left upper extremity    Vitamin D deficiency    Insect bite of right ankle    Annual physical exam    Essential tremor    Gastroesophageal reflux disease without esophagitis    Skin pustule    Bilateral hip pain    Sensory disorder     Allergy:   Allergies   Allergen Reactions    Bee Venom Unknown - Low Severity    Codeine Unknown - Low Severity    Latex Unknown - Low Severity     Persistent Contact         Discontinued Medications:  There are no discontinued medications.    Current Medications:   Current Outpatient Medications   Medication Sig Dispense Refill    ergocalciferol (ERGOCALCIFEROL) 1.25 MG (04705  UT) capsule Take 2,000 Units by mouth Daily.      fluticasone (FLONASE) 50 MCG/ACT nasal spray SPRAY 1 SPRAY INTO EACH NOSTRIL EVERY DAY 48 mL 5    lisdexamfetamine (Vyvanse) 50 MG capsule Take 1 capsule by mouth Every Morning 90 capsule 0    metoprolol succinate XL (TOPROL-XL) 100 MG 24 hr tablet TAKE 1 TABLET DAILY 90 tablet 1    omeprazole (priLOSEC) 20 MG capsule TAKE 1 CAPSULE DAILY 90 capsule 1    primidone (MYSOLINE) 50 MG tablet Titrate as directed and increase every 3 days up to 5 tablets twice a day. (Patient taking differently: Take 1 tablet by mouth Every Night. Titrate as directed and increase every 3 days up to 5 tablets twice a day.) 300 tablet 2    sertraline (ZOLOFT) 100 MG tablet Take 1 tablet by mouth Daily. 90 tablet 1    terazosin (HYTRIN) 2 MG capsule Take 1 capsule by mouth Every Night. 90 capsule 1     No current facility-administered medications for this visit.     Past Medical History:  Past Medical History:   Diagnosis Date    Callus     Essential hypertension 04/30/2019    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 IF it persists then EMG     Past Surgical History:  Past Surgical History:   Procedure Laterality Date    APPENDECTOMY      HAND SURGERY      HERNIA REPAIR      KNEE ACL RECONSTRUCTION Left 2000    KNEE MENISCECTOMY  2015    lateral    WISDOM TOOTH EXTRACTION       Family History:   Family History   Problem Relation Age of Onset    Heart disease Paternal Grandmother     Heart disease Paternal Grandfather     Cancer Paternal Grandfather     Diabetes Other        Mental Status Exam:   Observations:  Appearance: Neat  Speech: Normal  Eye Contact: Normal  Motor Activity: Normal  Affect:Full  Comments:  Mood:Mood: Euthymic  Cognition  Orientation Impairment: None  Memory Impairment: None  Attention: Normal  Comments:  Perception  Hallucinations:None  Other:  "  Comments:  Thoughts:  Suicidality:None  Homicidality:None  Delusions:  None  Comments:  Behavior:Behavior: Cooperative  Insight: Insight: Good  Judgement:Insight: Good    Vital Signs:   /72   Pulse 59   Ht 195.6 cm (77\")   Wt 120 kg (264 lb)   SpO2 100%   BMI 31.31 kg/m²    Lab Results:   Office Visit on 03/29/2024   Component Date Value Ref Range Status    Amphetamine Screen, Urine 03/29/2024 Positive (A)  Negative Final    AMP INTERNAL CONTROL 03/29/2024 Passed  Passed Final    Barbiturates Screen, Urine 03/29/2024 Negative  Negative Final    BARBITURATE INTERNAL CONTROL 03/29/2024 Passed  Passed Final    Buprenorphine, Screen, Urine 03/29/2024 Negative  Negative Final    BUPRENORPHINE INTERNAL CONTROL 03/29/2024 Passed  Passed Final    Benzodiazepine Screen, Urine 03/29/2024 Negative  Negative Final    BENZODIAZEPINE INTERNAL CONTROL 03/29/2024 Passed  Passed Final    Cocaine Screen, Urine 03/29/2024 Negative  Negative Final    COCAINE INTERNAL CONTROL 03/29/2024 Passed  Passed Final    MDMA (ECSTASY) 03/29/2024 Negative  Negative Final    MDMA (ECSTASY) INTERNAL CONTROL 03/29/2024 Passed  Passed Final    Methamphetamine, Ur 03/29/2024 Negative  Negative Final    METHAMPHETAMINE INTERNAL CONTROL 03/29/2024 Passed  Passed Final    Morphine/Opiates Screen, Urine 03/29/2024 Negative  Negative Final    MOR INTERNAL CONTROL 03/29/2024 Passed  Passed Final    Methadone Screen, Urine 03/29/2024 Negative  Negative Final    METHADONE INTERNAL CONTROL 03/29/2024 Passed  Passed Final    Oxycodone Screen, Urine 03/29/2024 Negative  Negative Final    OXYCODONE INTERNAL CONTROL 03/29/2024 Passed  Passed Final    Phencyclidine (PCP), Urine 03/29/2024 Negative  Negative Final    PHENCYCLIDINE INTERNAL CONTROL 03/29/2024 Passed  Passed Final    THC, Screen, Urine 03/29/2024 Negative  Negative Final    THC INTERNAL CONTROL 03/29/2024 Passed  Passed Final    Lot Number 03/29/2024 [id number]   Final    Expiration Date " 03/29/2024 11,122,025   Final   Office Visit on 12/04/2023   Component Date Value Ref Range Status    Color 12/04/2023 Yellow  Yellow, Straw, Dark Yellow, Claudia Final    Clarity, UA 12/04/2023 Clear  Clear Final    Specific Gravity  12/04/2023 1.030  1.005 - 1.030 Final    pH, Urine 12/04/2023 5.5  5.0 - 8.0 Final    Leukocytes 12/04/2023 Negative  Negative Final    Nitrite, UA 12/04/2023 Negative  Negative Final    Protein, POC 12/04/2023 Negative  Negative mg/dL Final    Glucose, UA 12/04/2023 Negative  Negative mg/dL Final    Ketones, UA 12/04/2023 Negative  Negative Final    Urobilinogen, UA 12/04/2023 0.2 E.U./dL  Normal, 0.2 E.U./dL Final    Bilirubin 12/04/2023 Negative  Negative Final    Blood, UA 12/04/2023 Negative  Negative Final    Lot Number 12/04/2023 [id number]   Final    Expiration Date 12/04/2023 4-2,024   Final    Glucose 12/04/2023 85  65 - 99 mg/dL Final    BUN 12/04/2023 15  6 - 20 mg/dL Final    Creatinine 12/04/2023 1.06  0.76 - 1.27 mg/dL Final    Sodium 12/04/2023 141  136 - 145 mmol/L Final    Potassium 12/04/2023 4.1  3.5 - 5.2 mmol/L Final    Chloride 12/04/2023 104  98 - 107 mmol/L Final    CO2 12/04/2023 27.9  22.0 - 29.0 mmol/L Final    Calcium 12/04/2023 9.3  8.6 - 10.5 mg/dL Final    Total Protein 12/04/2023 6.7  6.0 - 8.5 g/dL Final    Albumin 12/04/2023 4.2  3.5 - 5.2 g/dL Final    ALT (SGPT) 12/04/2023 36  1 - 41 U/L Final    AST (SGOT) 12/04/2023 27  1 - 40 U/L Final    Alkaline Phosphatase 12/04/2023 51  39 - 117 U/L Final    Total Bilirubin 12/04/2023 0.4  0.0 - 1.2 mg/dL Final    Globulin 12/04/2023 2.5  gm/dL Final    A/G Ratio 12/04/2023 1.7  g/dL Final    BUN/Creatinine Ratio 12/04/2023 14.2  7.0 - 25.0 Final    Anion Gap 12/04/2023 9.1  5.0 - 15.0 mmol/L Final    eGFR 12/04/2023 90.4  >60.0 mL/min/1.73 Final    PSA 12/04/2023 0.534  0.000 - 4.000 ng/mL Final       ASSESSMENT AND PLAN:    ICD-10-CM ICD-9-CM   1. Attention deficit hyperactivity disorder (ADHD), predominantly  inattentive type  F90.0 314.00   2. Suspected autism disorder  R68.89 780.99       Shiva who presents today for initial evaluation regarding . We have discussed the history and interpreted diagnoses as above as well as the treatment plan below, including potential R/B/SE of the recommended regimen of which the patient demonstrates understanding. Patient is agreeable to call 911 or go to the nearest ER should he become concerned for his own safety and/or the safety of those around him. There are overt indices of acute kevin/psychosis on evaluation today.     Medication regimen: per previous regimen, lisdexamfetamine 50 mg po daily, sertraline 100 mg po daily. patient is advised not to misuse prescribed medications or to use any exogenous substances that aren't disclosed to this provider as they may interact with the regimen to his detriment.   Risk Assessment: protracted risk is moderate, imminent risk is moderate.  Risk factors include:  anxiety disorder, mood disorder, and recent/ongoing psychosocial stressors. Protective factors include: no known family history of suicidality, intact reality testing, no substance use disorder, no access to firearms, no present SI, no stated history of suicide attempts or self-harm, patient's exhibited future-orientation, strong social support, and patient's cooperation with care. Do note that this is subject to change with the Catholic of new stressors, treatment non-adherence, use of substances, and/or new medical ails.  Monitoring: reviewed labs/imaging as populated above,  PHQ-9 today is PHQ-9 Total Score: 4 /27, INA-7 today is 2/21  Therapy: recommend ASD   Follow-up: as needed  Communications: N/A  Resource for Adults: https://www.autismspeaks.org/sites/default/files/Autism%20Speaks%20Adult%20Autism%20Diagnosis%20Tool%20Kit.pdf  TREATMENT PLAN/GOALS: challenge patterns of living conducive to symptom burden, implement recommended regimen as above with augmentative,  intermittent supportive psychotherapy to reduce symptom burden. Patient acknowledged and verbally consented to begin treatment as above. The importance of adherence to the recommended treatment and interval follow-up appointments was emphasized today. Patient was today advised to limit daily caffeine intake, hydrate appropriately, eat healthy and nutritious foods, engage sleep hygiene measures, engage appropriate exposure to sunlight, engage with hobbies in balance with life necessities, and exercise appropriate to their capacity to do so.     Billing: I have seen the patient today and considered his psychiatric complaints, rendered a diagnosis, and discussed treatment with the patient as above with which he consents.    Parts of this note are electronic transcriptions/translations of spoken language to printed text using the Dragon Dictation system.    Electronically signed by SHAAN Cole, 04/01/24,

## 2024-04-02 ENCOUNTER — TELEPHONE (OUTPATIENT)
Dept: FAMILY MEDICINE CLINIC | Facility: CLINIC | Age: 44
End: 2024-04-02
Payer: COMMERCIAL

## 2024-04-02 DIAGNOSIS — Z00.00 ANNUAL PHYSICAL EXAM: ICD-10-CM

## 2024-04-02 DIAGNOSIS — F90.0 ATTENTION DEFICIT HYPERACTIVITY DISORDER (ADHD), PREDOMINANTLY INATTENTIVE TYPE: ICD-10-CM

## 2024-04-02 RX ORDER — LISDEXAMFETAMINE DIMESYLATE CAPSULES 50 MG/1
50 CAPSULE ORAL EVERY MORNING
Qty: 90 CAPSULE | Refills: 0 | Status: SHIPPED | OUTPATIENT
Start: 2024-04-02

## 2024-04-02 NOTE — TELEPHONE ENCOUNTER
Caller: Shiva Varela    Relationship: Self    Best call back number: [phone]    Requested Prescriptions:   Requested Prescriptions     Pending Prescriptions Disp Refills    lisdexamfetamine (Vyvanse) 50 MG capsule 90 capsule 0     Sig: Take 1 capsule by mouth Every Morning        Pharmacy where request should be sent: [facility] [place] [address] - [address] AT [address]AL TO [facility] - [phone]  - [phone]     Last office visit with prescribing clinician: 3/29/2024   Last telemedicine visit with prescribing clinician: Visit date not found   Next office visit with prescribing clinician: 6/28/2024     Additional details provided by patient:     Does the patient have less than a 3 day supply:  [x] Yes  [] No      Demetria Daniels Rep   04/02/24 08:25 EDT

## 2024-04-03 ENCOUNTER — OFFICE VISIT (OUTPATIENT)
Dept: PSYCHIATRY | Facility: CLINIC | Age: 44
End: 2024-04-03
Payer: COMMERCIAL

## 2024-04-03 VITALS
BODY MASS INDEX: 31.17 KG/M2 | DIASTOLIC BLOOD PRESSURE: 72 MMHG | HEART RATE: 59 BPM | HEIGHT: 77 IN | OXYGEN SATURATION: 100 % | WEIGHT: 264 LBS | SYSTOLIC BLOOD PRESSURE: 120 MMHG

## 2024-04-03 DIAGNOSIS — F90.0 ATTENTION DEFICIT HYPERACTIVITY DISORDER (ADHD), PREDOMINANTLY INATTENTIVE TYPE: Primary | ICD-10-CM

## 2024-04-03 DIAGNOSIS — R68.89 SUSPECTED AUTISM DISORDER: ICD-10-CM

## 2024-04-03 NOTE — TREATMENT PLAN
Multi-Disciplinary Problems (from Behavioral Health Treatment Plan)      Active Problems       Problem: Adjustment  Start Date: 04/03/24      Problem Details: The patient self-scales this problem as a 3 with 10 being the worst.          Goal Priority Start Date Expected End Date End Date    Patient will implement healthy coping strategies. -- 04/03/24 -- --    Goal Details: Progress toward goal:  The patient self-scales their progress related to this goal as a 3 with 10 being the worst.          Goal Intervention Frequency Start Date End Date    Assist patient to identify and develop healthy coping strategies Weekly 04/03/24 --    Intervention Details: Duration of treatment until until remission of symptoms.                          Reviewed By       Yesica Cancino APRN 04/03/24 1221                     I have discussed and reviewed this treatment plan with the patient.  It has been printed for signatures.

## 2024-04-04 ENCOUNTER — TELEPHONE (OUTPATIENT)
Dept: FAMILY MEDICINE CLINIC | Facility: CLINIC | Age: 44
End: 2024-04-04
Payer: COMMERCIAL

## 2024-04-04 NOTE — TELEPHONE ENCOUNTER
Caller: MANPREET    Relationship to patient: Other    Best call back number:     [phone]       Patient is needing: NEEDING PRIOR AUTHORIZATION FOR MRI ORDER. PLEASE ADVISE.

## 2024-04-04 NOTE — TELEPHONE ENCOUNTER
HUB to relay: MRI was ordered on 3/29/2024. Our scheduling/Auth department will call for auth when ready.

## 2024-04-08 ENCOUNTER — TELEPHONE (OUTPATIENT)
Dept: FAMILY MEDICINE CLINIC | Facility: CLINIC | Age: 44
End: 2024-04-08
Payer: COMMERCIAL

## 2024-04-08 DIAGNOSIS — F90.0 ATTENTION DEFICIT HYPERACTIVITY DISORDER (ADHD), PREDOMINANTLY INATTENTIVE TYPE: ICD-10-CM

## 2024-04-08 RX ORDER — DEXTROAMPHETAMINE SACCHARATE, AMPHETAMINE ASPARTATE MONOHYDRATE, DEXTROAMPHETAMINE SULFATE AND AMPHETAMINE SULFATE 5; 5; 5; 5 MG/1; MG/1; MG/1; MG/1
20 CAPSULE, EXTENDED RELEASE ORAL DAILY
Qty: 90 CAPSULE | Refills: 0 | Status: SHIPPED | OUTPATIENT
Start: 2024-04-08 | End: 2024-07-07

## 2024-04-09 ENCOUNTER — TELEPHONE (OUTPATIENT)
Dept: FAMILY MEDICINE CLINIC | Facility: CLINIC | Age: 44
End: 2024-04-09
Payer: COMMERCIAL

## 2024-04-17 ENCOUNTER — TELEPHONE (OUTPATIENT)
Dept: FAMILY MEDICINE CLINIC | Facility: CLINIC | Age: 44
End: 2024-04-17
Payer: COMMERCIAL

## 2024-05-06 ENCOUNTER — HOSPITAL ENCOUNTER (OUTPATIENT)
Dept: MRI IMAGING | Facility: HOSPITAL | Age: 44
Discharge: HOME | End: 2024-05-06
Payer: COMMERCIAL

## 2024-05-06 DIAGNOSIS — M25.551 BILATERAL HIP PAIN: ICD-10-CM

## 2024-05-06 DIAGNOSIS — M25.552 BILATERAL HIP PAIN: ICD-10-CM

## 2024-05-07 ENCOUNTER — TELEPHONE (OUTPATIENT)
Dept: FAMILY MEDICINE CLINIC | Facility: CLINIC | Age: 44
End: 2024-05-07
Payer: COMMERCIAL

## 2024-05-13 DIAGNOSIS — R39.12 WEAK URINE STREAM: ICD-10-CM

## 2024-05-13 RX ORDER — TERAZOSIN 2 MG/1
2 CAPSULE ORAL NIGHTLY
Qty: 90 CAPSULE | Refills: 1 | Status: SHIPPED | OUTPATIENT
Start: 2024-05-13

## 2024-05-15 ENCOUNTER — TELEPHONE (OUTPATIENT)
Dept: FAMILY MEDICINE CLINIC | Facility: CLINIC | Age: 44
End: 2024-05-15
Payer: COMMERCIAL

## 2024-05-15 NOTE — TELEPHONE ENCOUNTER
Caller: Shiva Varela    Relationship: Self    Best call back number: [phone]     What is the medical concern/diagnosis: ORTHOPEDIC      Any additional details: REQUESTING  REFERRAL AFTER HIS MRI RESULTS.

## 2024-05-16 DIAGNOSIS — S31.41XS LABIAL TEAR, SEQUELA: ICD-10-CM

## 2024-05-16 DIAGNOSIS — S31.41XA TEAR OF LABIUM, INITIAL ENCOUNTER: ICD-10-CM

## 2024-05-16 DIAGNOSIS — M25.551 BILATERAL HIP PAIN: ICD-10-CM

## 2024-05-16 DIAGNOSIS — R93.89 ABNORMAL MRI: Primary | ICD-10-CM

## 2024-05-16 DIAGNOSIS — M25.552 BILATERAL HIP PAIN: ICD-10-CM

## 2024-06-03 ENCOUNTER — OFFICE VISIT (OUTPATIENT)
Dept: ORTHOPEDIC SURGERY | Facility: CLINIC | Age: 44
End: 2024-06-03
Payer: COMMERCIAL

## 2024-06-03 VITALS
HEART RATE: 78 BPM | OXYGEN SATURATION: 96 % | HEIGHT: 77 IN | DIASTOLIC BLOOD PRESSURE: 71 MMHG | WEIGHT: 253 LBS | SYSTOLIC BLOOD PRESSURE: 118 MMHG | BODY MASS INDEX: 29.87 KG/M2

## 2024-06-03 DIAGNOSIS — M70.62 TROCHANTERIC BURSITIS OF LEFT HIP: ICD-10-CM

## 2024-06-03 DIAGNOSIS — M76.891 HIP TENDONITIS, RIGHT: ICD-10-CM

## 2024-06-03 DIAGNOSIS — M25.551 BILATERAL HIP PAIN: Primary | ICD-10-CM

## 2024-06-03 DIAGNOSIS — M25.552 BILATERAL HIP PAIN: Primary | ICD-10-CM

## 2024-06-03 DIAGNOSIS — M25.552 LEFT HIP PAIN: ICD-10-CM

## 2024-06-03 DIAGNOSIS — M25.551 RIGHT HIP PAIN: ICD-10-CM

## 2024-06-03 PROCEDURE — 99203 OFFICE O/P NEW LOW 30 MIN: CPT | Performed by: ORTHOPAEDIC SURGERY

## 2024-06-03 NOTE — PROGRESS NOTES
"Chief Complaint  Initial Evaluation of the Left Hip and Initial Evaluation of the Right Hip     Subjective      Shiva Varela presents to [facility] for initial evaluation of bilateral hips. He has had pain in the gluteal and the lateral aspect of the hip.  He had a MRI and and X rays today.  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 He has seen chiropractic care.     Allergies   Allergen Reactions    Bee Venom Unknown - Low Severity    Codeine Unknown - Low Severity    Latex Unknown - Low Severity     Persistent Contact         Social History     Socioeconomic History    Marital status: Single   Tobacco Use    Smoking status: Never     Passive exposure: Never    Smokeless tobacco: Never   Vaping Use    Vaping status: Never Used   Substance and Sexual Activity    Alcohol use: Never    Drug use: Never    Sexual activity: Defer        I reviewed the patient's chief complaint, history of present illness, review of systems, past medical history, surgical history, family history, social history, medications, and allergy list.     Review of Systems     Constitutional: Denies fevers, chills, weight loss  Cardiovascular: Denies chest pain, shortness of breath  Skin: Denies rashes, acute skin changes  Neurologic: Denies headache, loss of consciousness        Vital Signs:   /71   Pulse 78   Ht 195.6 cm (77\")   Wt 115 kg (253 lb)   SpO2 96%   BMI 30.00 kg/m²          Physical Exam  General: Alert. No acute distress    Ortho Exam        BILATERAL HIPS  No skin discoloration, atrophy or swelling. Positive EHL, FHL, GS, and TA. Sensation intact to all 5 nerves of the foot. Negative straight leg raise. Leg lengths appear equal. Ambulates with Non-antalgic gait Negative Afua. Negative Lisa. Good strength to hamstrings, quadriceps, dorsiflexors, and plantar flexors. Knee Extensor Mechanism  intact        Procedures        Imaging Results (Most Recent)       Procedure " Component Value Units Date/Time    XR Hips Bilateral With or Without Pelvis 3-4 View [[id number]] Resulted: 06/03/24 1405     Updated: 06/03/24 1410             Result Review :     X-Ray Report:  Bilateral  hip X-Ray  Indication: Evaluation of Bilateral hips  AP/Lateral view(s)  Findings: No acute degenerative changes of the hips.   Prior studies available for comparison: no       5/15/24 MRI [facility]  COMPARISON: None  FINDINGS: Left hip:  There is convexity of the femoral head and neck morphology. The orientation of the acetabulum is within the range of normal.  There is anterior superior labral tear on series 10, image 9. There is mild-to-moderate thinning of the cartilage. Mild degree of  marginal osteophytosis. No definite joint effusion. No evidence of reactive bone marrow edema. No subchondral cystic changes  identified. There is small amount of fluid at the greater trochanter on the left side.  Right hip:  There is convexity of the femoral head and neck morphology. The orientation of the acetabulum is within the range of normal.  There is anterior superior labral tear on series 6 image 24. Mild thinning of the cartilage. There is no evidence of subchondral bone  marrow edema. Mild degree of subchondral lucency seen. Mild degree of marginal osteophytosis. There is fluid signal at the  greater trochanter on the right side.  Pelvis:  There is tendinosis of the origin of the hamstrings bilaterally. The origin of the adductors demonstrate no abnormalities. Sacroiliac  joints and pubic symphysis joints demonstrate no abnormalities.  Urinary bladder is unremarkable. No free fluid or adenopathy in the pelvis is identified.  IMPRESSION:  Mild degenerative changes of the hips bilaterally.  Anterior superior labral tear bilaterally  Gluteus medius partial-thickness insertional strain versus low-grade tear bilaterally greater on the right than on the left    COMPARISON: None  FINDINGS: Left hip:  There is convexity of  the femoral head and neck morphology. The orientation of the acetabulum is within the range of normal.  There is anterior superior labral tear on series 10, image 9. There is mild-to-moderate thinning of the cartilage. Mild degree of  marginal osteophytosis. No definite joint effusion. No evidence of reactive bone marrow edema. No subchondral cystic changes  identified. There is small amount of fluid at the greater trochanter on the left side.  Right hip:  There is convexity of the femoral head and neck morphology. The orientation of the acetabulum is within the range of normal.  There is anterior superior labral tear on series 6 image 24. Mild thinning of the cartilage. There is no evidence of subchondral bone  marrow edema. Mild degree of subchondral lucency seen. Mild degree of marginal osteophytosis. There is fluid signal at the  greater trochanter on the right side.  Pelvis:  There is tendinosis of the origin of the hamstrings bilaterally. The origin of the adductors demonstrate no abnormalities. Sacroiliac  joints and pubic symphysis joints demonstrate no abnormalities.  Urinary bladder is unremarkable. No free fluid or adenopathy in the pelvis is identified.  IMPRESSION:  Mild degenerative changes of the hips bilaterally.  Anterior superior labral tear bilaterally  Gluteus medius partial-thickness insertional strain versus low-grade tear bilaterally greater on the right than on the left          Assessment and Plan     Diagnoses and all orders for this visit:    1. Bilateral hip pain (Primary)  -     XR Hips Bilateral With or Without Pelvis 3-4 View    2. Right hip pain    3. Left hip pain    4. Left hip tendonitis  -     Ambulatory Referral to Physical Therapy    5. Hip tendonitis, right        Discussed the treatment plan with the patient. I reviewed the X-rays that were obtained today with the patient. I reviewed the MRI results with the patient.     Prescribed physical therapy.       Call or return if  worsening symptoms.    Follow Up     PRN      Patient was given instructions and counseling regarding his condition or for health maintenance advice. Please see specific information pulled into the AVS if appropriate.     Scribed for Flavio Dumont MD by Betsey Baugh MA.  06/03/24   14:01 EDT    I have personally performed the services described in this document as scribed by the above individual and it is both accurate and complete. Flavio Dumont MD 06/03/24

## 2024-06-04 ENCOUNTER — PATIENT ROUNDING (BHMG ONLY) (OUTPATIENT)
Dept: ORTHOPEDIC SURGERY | Facility: CLINIC | Age: 44
End: 2024-06-04
Payer: COMMERCIAL

## 2024-06-04 NOTE — PROGRESS NOTES
A [facility] message has been sent to the patient for PATIENT ROUNDING with [facility].

## 2024-06-18 ENCOUNTER — TREATMENT (OUTPATIENT)
Dept: PHYSICAL THERAPY | Facility: CLINIC | Age: 44
End: 2024-06-18
Payer: COMMERCIAL

## 2024-06-18 DIAGNOSIS — M25.551 BILATERAL HIP PAIN: Primary | ICD-10-CM

## 2024-06-18 DIAGNOSIS — M25.552 BILATERAL HIP PAIN: Primary | ICD-10-CM

## 2024-06-18 NOTE — PROGRESS NOTES
"  Physical Therapy Initial Evaluation and Plan of Care                       Patient: Shiva Varela   : 1980  Diagnosis/ICD-10 Code:  Bilateral hip pain [M25.551, M25.552]  Referring practitioner: Flavio Dumont MD  Date of Initial Visit: 2024  Today's Date: 2024  Patient seen for 1 sessions           Subjective Questionnaire: LEFS: 73      Subjective Evaluation    History of Present Illness  Mechanism of injury: Patient reports 8 years of bilateral hip pain that has worsened with time. He notes increased burning several hours at the proximal hamstring and the lateral hip after using the elliptical, which he does 30 minutes daily. He has had an MRI that showed tendinosis of the hamstrings ans small grade tears of the gluteus medius. He has taken ibuprofen for pain relief  with good success. He can now only perform 20 minutes of the elliptical from a 7 resistance to a 4 resistance. He states he previously saw a chiropractor and states his hips were \"tilted\" and reports adjustments hips.     Pain  Quality: burning and discomfort  Progression: worsening    Treatments  Previous treatment: medication  Patient Goals  Patient goals for therapy: increased strength, independence with ADLs/IADLs, decreased pain, increased motion and return to sport/leisure activities             Objective          Passive Range of Motion   Left Hip   Flexion: 120 degrees   Abduction: 42 degrees   External rotation (90/90): 62 degrees   Internal rotation (90/90): 31 degrees     Right Hip   Flexion: 126 degrees   Abduction: 38 degrees   External rotation (90/90): 66 degrees   Internal rotation (90/90): 28 degrees     Strength/Myotome Testing     Left Hip   Planes of Motion   Flexion: 5  Abduction: 4  External rotation: 5  Internal rotation: 5    Right Hip   Planes of Motion   Flexion: 5  Abduction: 4  External rotation: 5  Internal rotation: 5    Left Knee   Flexion: 4  Extension: 5    Right Knee   Flexion: 4+  Extension: 5 "      See Exercise, Manual, and Modality Logs for complete treatment.     Assessment & Plan       Assessment  Impairments: abnormal gait, abnormal muscle firing, abnormal or restricted ROM, activity intolerance, impaired balance, impaired physical strength, lacks appropriate home exercise program, pain with function, safety issue and weight-bearing intolerance   Functional limitations: walking, uncomfortable because of pain, moving in bed, standing and stooping   Assessment details: The patient presents to physical therapy with complaints of bilateral hip pain. 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 He would benefit from skilled physical therapy as described below to address these limitations and allow the patient to return to his prior level of function.      Prognosis: good    Goals  Plan Goals: HIP PROBLEMS    1. The patient complains of  hip pain.   LTG 1: 12 weeks:  The patient will report a pain rating of 1/10 or better in order to improve  tolerance to activities of daily living and improve sleep quality.    STATUS:  New   STG 1a: 6 weeks:  The patient will report a pain rating of 4/10 or better.    STATUS:  New    2. The patient demonstrates weakness of the  hip.   LTG 2: 12 weeks:  The patient will demonstrate 5/5 strength for  hip flexion, abduction,  and extension in order to improve hip stability.    STATUS:  New   STG 2a: 6 weeks:  The patient will demonstrate 4+/5 strength for  hip flexion, abduction,  and extension.    STATUS:  New    3. The patient has gait dysfunction.  LTG 3: 12 weeks:  The patient will ambulate without assistive device, independently, for community distances with minimal limp to the  lower extremity in order to improve mobility and allow patient to perform activities such as grocery shopping with greater ease.  STATUS:  New  STG 3a: The patient will be independent in [facility].  STATUS:  New    4. Mobility:  Walking/Moving Around Functional Limitation    LTG 4: 12 weeks:  The patient will demonstrate improved function by achieving a score of 80 on the Lower Extremity Functional Scale.  STATUS:  New  STG 4a: 6 weeks:  The patient will demonstrate improved function by achieving a score of 75 on the Lower Extremity Functional Scale.    STATUS:  New     Plan  Therapy options: will be seen for skilled therapy services  Planned modality interventions: cryotherapy, electrical stimulation/Russian stimulation and TENS  Planned therapy interventions: balance/weight-bearing training, ADL retraining, soft tissue mobilization, strengthening, stretching, therapeutic activities, joint mobilization, home exercise program, gait training, functional ROM exercises, flexibility, body mechanics training, postural training, neuromuscular re-education and manual therapy  Frequency: 3x week  Duration in weeks: 12  Treatment plan discussed with: patient        Visit Diagnoses:    ICD-10-CM ICD-9-CM   1. Bilateral hip pain  M25.551 719.45    M25.552        History # of Personal Factors and/or Comorbidities: MODERATE (1-2)  Examination of Body System(s): # of elements: MODERATE (3)  Clinical Presentation: EVOLVING  Clinical Decision Making: MODERATE      Timed:         Manual Therapy:    0     mins  45717;     Therapeutic Exercise:    25     mins  60671;     Neuromuscular Karan:    0    mins  22463;    Therapeutic Activity:     0     mins  52385;     Gait Trainin     mins  25683;     Ultrasound:     0     mins  54467;    Ionto                               0    mins   39377  Self Care                       0     mins   51989  Canalith Repos    0     mins 78953      Un-Timed:  Electrical Stimulation:    0     mins  90083 ( );  Dry Needling     0     mins self-pay  Traction     0     mins 19895  Low Eval     0     Mins  32687  Mod Eval     33     Mins  92081  High Eval                       0     Mins  27800  Re-Eval                            0    mins  10759    Timed Treatment:   25   mins   Total Treatment:     58   mins    PT SIGNATURE: Anil Reynoso, PT    Electronically signed 6/18/2024    [place] License: PT - [id number]      Initial Certification  Certification Period: 6/18/2024 thru 9/15/2024  I certify that the therapy services are furnished while this patient is under my care.  The services outlined above are required by this patient, and will be reviewed every 90 days.     PHYSICIAN: Flavio Dumont MD  NPI: [id number]      DATE:     Please sign and return via fax to [phone]. Thank you, [facility].

## 2024-06-26 ENCOUNTER — TREATMENT (OUTPATIENT)
Dept: PHYSICAL THERAPY | Facility: CLINIC | Age: 44
End: 2024-06-26
Payer: COMMERCIAL

## 2024-06-26 DIAGNOSIS — M25.552 BILATERAL HIP PAIN: Primary | ICD-10-CM

## 2024-06-26 DIAGNOSIS — M25.551 BILATERAL HIP PAIN: Primary | ICD-10-CM

## 2024-06-26 NOTE — PROGRESS NOTES
Physical Therapy Daily Treatment Note  [place] [facility] [address] [place] [address]    Patient: Shiva Varela   : 1980  Referring practitioner: Flavio Dumont MD  Date of Initial Visit: Type: THERAPY  Noted: 2024  Today's Date: 2024  Patient seen for 2 sessions           Subjective  Shiva Varela reports: no pain at arrival. He commented he has not felt the familiar pain since last week.     Justino related he anticipates going out of state for family emergency, not electing to make additional therapy sessions at this time. He related he will do his HEP while absent from therapy and seek additional therapy when he returns.     Objective   Anil stated he had a sacral fracture in September 15, 2023.  See Exercise, Manual, and Modality Logs for complete treatment.     Assessment/Plan  This is the first follow up after IE. Please refer to subjective notation.  Justino stated he has no questions about HEP nor difficulty with HEP.    Visit Diagnoses:    ICD-10-CM ICD-9-CM   1. Bilateral hip pain  M25.551 719.45    M25.552        Progress per Plan of Care and Progress strengthening /stabilization /functional activity           Timed:  Manual Therapy:         mins  10105;  Therapeutic Exercise:    8     mins  56252;     Neuromuscular Karan:        mins  36025;    Therapeutic Activity:     23     mins  41145;     Gait Training:           mins  98207;     Ultrasound:          mins  43148;    Electrical Stimulation:         mins  15261 ( );  Aquatics  __   mins   74258    Untimed:  Electrical Stimulation:         mins  32380 ( );  Mechanical Traction:         mins  68416;     Timed Treatment:   31   mins   Total Treatment:     31   mins    Electronically Signed:  Shae Fournier PTA  Physical Therapist Assistant    [place] PTA license [id number]

## 2024-06-28 ENCOUNTER — OFFICE VISIT (OUTPATIENT)
Dept: FAMILY MEDICINE CLINIC | Facility: CLINIC | Age: 44
End: 2024-06-28
Payer: COMMERCIAL

## 2024-06-28 VITALS
HEART RATE: 82 BPM | DIASTOLIC BLOOD PRESSURE: 69 MMHG | OXYGEN SATURATION: 98 % | SYSTOLIC BLOOD PRESSURE: 110 MMHG | WEIGHT: 258 LBS | HEIGHT: 77 IN | BODY MASS INDEX: 30.46 KG/M2 | TEMPERATURE: 97 F

## 2024-06-28 DIAGNOSIS — R39.12 WEAK URINE STREAM: ICD-10-CM

## 2024-06-28 DIAGNOSIS — F90.0 ATTENTION DEFICIT HYPERACTIVITY DISORDER (ADHD), PREDOMINANTLY INATTENTIVE TYPE: ICD-10-CM

## 2024-06-28 DIAGNOSIS — R39.12 BENIGN PROSTATIC HYPERPLASIA WITH WEAK URINARY STREAM: ICD-10-CM

## 2024-06-28 DIAGNOSIS — I10 ESSENTIAL HYPERTENSION: ICD-10-CM

## 2024-06-28 DIAGNOSIS — Z00.00 ANNUAL PHYSICAL EXAM: ICD-10-CM

## 2024-06-28 DIAGNOSIS — N40.1 BENIGN PROSTATIC HYPERPLASIA WITH WEAK URINARY STREAM: ICD-10-CM

## 2024-06-28 DIAGNOSIS — E78.2 MIXED HYPERLIPIDEMIA: Primary | ICD-10-CM

## 2024-06-28 LAB
ALBUMIN SERPL-MCNC: 4.2 G/DL (ref 3.5–5.2)
ALBUMIN/GLOB SERPL: 1.6 G/DL
ALP SERPL-CCNC: 54 U/L (ref 39–117)
ALT SERPL W P-5'-P-CCNC: 17 U/L (ref 1–41)
ANION GAP SERPL CALCULATED.3IONS-SCNC: 6 MMOL/L (ref 5–15)
AST SERPL-CCNC: 21 U/L (ref 1–40)
BILIRUB SERPL-MCNC: 0.3 MG/DL (ref 0–1.2)
BUN SERPL-MCNC: 16 MG/DL (ref 6–20)
BUN/CREAT SERPL: 15.1 (ref 7–25)
CALCIUM SPEC-SCNC: 9.1 MG/DL (ref 8.6–10.5)
CHLORIDE SERPL-SCNC: 102 MMOL/L (ref 98–107)
CHOLEST SERPL-MCNC: 160 MG/DL (ref 0–200)
CO2 SERPL-SCNC: 31 MMOL/L (ref 22–29)
CREAT SERPL-MCNC: 1.06 MG/DL (ref 0.76–1.27)
EGFRCR SERPLBLD CKD-EPI 2021: 90.4 ML/MIN/1.73
GLOBULIN UR ELPH-MCNC: 2.6 GM/DL
GLUCOSE SERPL-MCNC: 65 MG/DL (ref 65–99)
HDLC SERPL-MCNC: 44 MG/DL (ref 40–60)
LDLC SERPL CALC-MCNC: 95 MG/DL (ref 0–100)
LDLC/HDLC SERPL: 2.1 {RATIO}
POTASSIUM SERPL-SCNC: 4.1 MMOL/L (ref 3.5–5.2)
PROT SERPL-MCNC: 6.8 G/DL (ref 6–8.5)
SODIUM SERPL-SCNC: 139 MMOL/L (ref 136–145)
TRIGL SERPL-MCNC: 119 MG/DL (ref 0–150)
VLDLC SERPL-MCNC: 21 MG/DL (ref 5–40)

## 2024-06-28 PROCEDURE — 99214 OFFICE O/P EST MOD 30 MIN: CPT | Performed by: FAMILY MEDICINE

## 2024-06-28 PROCEDURE — 80053 COMPREHEN METABOLIC PANEL: CPT | Performed by: FAMILY MEDICINE

## 2024-06-28 PROCEDURE — 80061 LIPID PANEL: CPT | Performed by: FAMILY MEDICINE

## 2024-06-28 RX ORDER — TERAZOSIN 2 MG/1
2 CAPSULE ORAL 2 TIMES DAILY
Qty: 180 CAPSULE | Refills: 1 | Status: SHIPPED | OUTPATIENT
Start: 2024-06-28

## 2024-06-28 RX ORDER — LISDEXAMFETAMINE DIMESYLATE 50 MG/1
50 CAPSULE ORAL EVERY MORNING
Qty: 90 CAPSULE | Refills: 0 | Status: SHIPPED | OUTPATIENT
Start: 2024-06-28

## 2024-06-28 NOTE — ASSESSMENT & PLAN NOTE
He is doing okay.  He did feel that he did better with the Vyvanse.  Hopefully this is more available we can get that again at his retail pharmacy and we will change him back to that

## 2024-06-28 NOTE — PROGRESS NOTES
Chief Complaint   Patient presents with    Follow-up     3 month     Hyperlipidemia    Hypertension     ADHD        Subjective     Shiva Varela  has a past medical history of Callus, Essential hypertension (04/30/2019), and Ulnar neuropathy at elbow of left upper extremity (09/10/2020).    ADD-he states his attention focus concentration and distractibility are about the same.  He is taking his Adderall on a daily basis.  He preferred the Vyvanse over the Adderall but had to switch due to product availability.    Hyperlipidemia  This is a chronic problem. The current episode started more than 1 year ago. The problem is controlled. Recent lipid tests were reviewed and are normal. He has no history of diabetes, hypothyroidism or obesity. Pertinent negatives include no chest pain or shortness of breath. He is currently on no antihyperlipidemic treatment. The current treatment provides significant improvement of lipids. There are no compliance problems.  Risk factors for coronary artery disease include hypertension and male sex.   Hypertension  This is a chronic problem. The current episode started more than 1 year ago. The problem has been stable since onset. The problem is controlled. Pertinent negatives include no chest pain, headaches, palpitations or shortness of breath. Risk factors for coronary artery disease include male gender. Current antihypertension treatment includes beta blockers. There are no compliance problems.  There is no history of kidney disease or CAD/MI.       PHQ-2 Depression Screening  Little interest or pleasure in doing things?     Feeling down, depressed, or hopeless?     PHQ-2 Total Score     PHQ-9 Depression Screening  Little interest or pleasure in doing things?     Feeling down, depressed, or hopeless?     Trouble falling or staying asleep, or sleeping too much?     Feeling tired or having little energy?     Poor appetite or overeating?     Feeling bad about yourself - or that you are a  failure or have let yourself or your family down?     Trouble concentrating on things, such as reading the newspaper or watching television?     Moving or speaking so slowly that other people could have noticed? Or the opposite - being so fidgety or restless that you have been moving around a lot more than usual?     Thoughts that you would be better off dead, or of hurting yourself in some way?     PHQ-9 Total Score     If you checked off any problems, how difficult have these problems made it for you to do your work, take care of things at home, or get along with other people?       Allergies   Allergen Reactions    Bee Venom Unknown - Low Severity    Codeine Unknown - Low Severity    Latex Unknown - Low Severity     Persistent Contact        Prior to Admission medications    Medication Sig Start Date End Date Taking? Authorizing Provider   amphetamine-dextroamphetamine XR (Adderall XR) 20 MG 24 hr capsule Take 1 capsule by mouth Daily for 90 days 4/8/24 7/7/24 Yes Ed Myers, DO   fluticasone (FLONASE) 50 MCG/ACT nasal spray SPRAY 1 SPRAY INTO EACH NOSTRIL EVERY DAY 9/13/22  Yes Ed Myers DO   lisdexamfetamine (Vyvanse) 50 MG capsule Take 1 capsule by mouth Every Morning 4/2/24  Yes Ed Myers DO   metoprolol succinate XL (TOPROL-XL) 100 MG 24 hr tablet TAKE 1 TABLET DAILY 8/14/23  Yes Ed Myers DO   omeprazole (priLOSEC) 20 MG capsule TAKE 1 CAPSULE DAILY 8/14/23  Yes Ed Myers DO   primidone (MYSOLINE) 50 MG tablet Titrate as directed and increase every 3 days up to 5 tablets twice a day.  Patient taking differently: Take 1 tablet by mouth Every Night. Titrate as directed and increase every 3 days up to 5 tablets twice a day. 2/12/24  Yes Jeremy Warner MD   sertraline (ZOLOFT) 100 MG tablet Take 1 tablet by mouth Daily. 2/23/24  Yes Ed Myers DO   terazosin (HYTRIN) 2 MG capsule TAKE 1 CAPSULE BY MOUTH EVERY DAY AT  NIGHT 5/13/24  Yes Ed Myers, DO   ergocalciferol (ERGOCALCIFEROL) 1.25 MG (74847 UT) capsule Take 2,000 Units by mouth Daily.  Patient not taking: Reported on 6/28/2024    Provider, Ramona, MD        Patient Active Problem List   Diagnosis    Attention deficit hyperactivity disorder (ADHD)    Essential hypertension    Major depressive disorder, recurrent episode, in full remission    Difficulty urinating    Benign prostatic hyperplasia with weak urinary stream    Spondylolisthesis at L5-S1 level    Abdominal hernia    Depressive disorder    Eczema    Mixed hyperlipidemia    Ulnar neuropathy at elbow of left upper extremity    Vitamin D deficiency    Insect bite of right ankle    Annual physical exam    Essential tremor    Gastroesophageal reflux disease without esophagitis    Skin pustule    Bilateral hip pain    Sensory disorder        Past Surgical History:   Procedure Laterality Date    APPENDECTOMY      HAND SURGERY      HERNIA REPAIR      KNEE ACL RECONSTRUCTION Left 2000    KNEE MENISCECTOMY  2015    lateral    WISDOM TOOTH EXTRACTION         Social History     Socioeconomic History    Marital status: Single   Tobacco Use    Smoking status: Never     Passive exposure: Never    Smokeless tobacco: Never   Vaping Use    Vaping status: Never Used   Substance and Sexual Activity    Alcohol use: Never    Drug use: Never    Sexual activity: Defer       Family History   Problem Relation Age of Onset    Heart disease Paternal Grandmother     Heart disease Paternal Grandfather     Cancer Paternal Grandfather     Diabetes Other        Family history, surgical history, past medical history, Allergies and meds reviewed with patient today and updated in [facility] EMR.     ROS:  Review of Systems   Constitutional:  Negative for fatigue.   Respiratory:  Negative for cough, chest tightness, shortness of breath and wheezing.    Cardiovascular:  Negative for chest pain and palpitations.   Psychiatric/Behavioral:   "Positive for decreased concentration.        OBJECTIVE:  Vitals:    06/28/24 0950   BP: 110/69   BP Location: Left arm   Patient Position: Sitting   Pulse: 82   Temp: 97 °F (36.1 °C)   SpO2: 98%   Weight: 117 kg (258 lb)   Height: 195.6 cm (77\")     No results found.   Body mass index is 30.59 kg/m².  No LMP for male patient.    The 10-year ASCVD risk score (Gopi HILLMAN, et al., 2019) is: 1%     Physical Exam  Vitals and nursing note reviewed.   Constitutional:       General: He is not in acute distress.     Appearance: Normal appearance. He is normal weight.   HENT:      Head: Normocephalic.   Cardiovascular:      Rate and Rhythm: Normal rate and regular rhythm.      Pulses: Normal pulses.      Heart sounds: Normal heart sounds. No murmur heard.  Pulmonary:      Effort: Pulmonary effort is normal.      Breath sounds: Normal breath sounds. No wheezing, rhonchi or rales.   Skin:     General: Skin is warm and dry.      Coloration: Skin is not jaundiced.      Findings: No rash.   Neurological:      General: No focal deficit present.      Mental Status: He is alert and oriented to person, place, and time.   Psychiatric:         Mood and Affect: Mood normal.         Behavior: Behavior normal.         Thought Content: Thought content normal.         Judgment: Judgment normal.         Procedures    No visits with results within 30 Day(s) from this visit.   Latest known visit with results is:   Office Visit on 03/29/2024   Component Date Value Ref Range Status    Amphetamine Screen, Urine 03/29/2024 Positive (A)  Negative Final    AMP INTERNAL CONTROL 03/29/2024 Passed  Passed Final    Barbiturates Screen, Urine 03/29/2024 Negative  Negative Final    BARBITURATE INTERNAL CONTROL 03/29/2024 Passed  Passed Final    Buprenorphine, Screen, Urine 03/29/2024 Negative  Negative Final    BUPRENORPHINE INTERNAL CONTROL 03/29/2024 Passed  Passed Final    Benzodiazepine Screen, Urine 03/29/2024 Negative  Negative Final    BENZODIAZEPINE " INTERNAL CONTROL 03/29/2024 Passed  Passed Final    Cocaine Screen, Urine 03/29/2024 Negative  Negative Final    COCAINE INTERNAL CONTROL 03/29/2024 Passed  Passed Final    MDMA (ECSTASY) 03/29/2024 Negative  Negative Final    MDMA (ECSTASY) INTERNAL CONTROL 03/29/2024 Passed  Passed Final    Methamphetamine, Ur 03/29/2024 Negative  Negative Final    METHAMPHETAMINE INTERNAL CONTROL 03/29/2024 Passed  Passed Final    Morphine/Opiates Screen, Urine 03/29/2024 Negative  Negative Final    MOR INTERNAL CONTROL 03/29/2024 Passed  Passed Final    Methadone Screen, Urine 03/29/2024 Negative  Negative Final    METHADONE INTERNAL CONTROL 03/29/2024 Passed  Passed Final    Oxycodone Screen, Urine 03/29/2024 Negative  Negative Final    OXYCODONE INTERNAL CONTROL 03/29/2024 Passed  Passed Final    Phencyclidine (PCP), Urine 03/29/2024 Negative  Negative Final    PHENCYCLIDINE INTERNAL CONTROL 03/29/2024 Passed  Passed Final    THC, Screen, Urine 03/29/2024 Negative  Negative Final    THC INTERNAL CONTROL 03/29/2024 Passed  Passed Final    Lot Number 03/29/2024 [id number]   Final    Expiration Date 03/29/2024 11,122,025   Final       ASSESSMENT/ PLAN:    Diagnoses and all orders for this visit:    1. Mixed hyperlipidemia (Primary)  Assessment & Plan:   He has some pending labs for his lipids we will get those while he is here today      2. Essential hypertension  Assessment & Plan:  His blood pressure remains good.  Will continue his current meds and get his outstanding labs      3. Attention deficit hyperactivity disorder (ADHD), predominantly inattentive type  Assessment & Plan:  He is doing okay.  He did feel that he did better with the Vyvanse.  Hopefully this is more available we can get that again at his retail pharmacy and we will change him back to that    Orders:  -     lisdexamfetamine (Vyvanse) 50 MG capsule; Take 1 capsule by mouth Every Morning  Dispense: 90 capsule; Refill: 0    4. Benign prostatic hyperplasia with  weak urinary stream  Assessment & Plan:  He still has some daytime symptoms of difficulty voiding.  Will try doing his Hytrin twice a day and see if it helps.      5. Weak urine stream  Comments:  Suspected enlarged prostate.  Discussed options with patient.  He wants to try HYtrin and if doesn't work would like to have Prostate US done.  Orders:  -     terazosin (HYTRIN) 2 MG capsule; Take 1 capsule by mouth 2 (Two) Times a Day.  Dispense: 180 capsule; Refill: 1    6. Annual physical exam  -     lisdexamfetamine (Vyvanse) 50 MG capsule; Take 1 capsule by mouth Every Morning  Dispense: 90 capsule; Refill: 0               Orders Placed Today:     New Medications Ordered This Visit   Medications    terazosin (HYTRIN) 2 MG capsule     Sig: Take 1 capsule by mouth 2 (Two) Times a Day.     Dispense:  180 capsule     Refill:  1    lisdexamfetamine (Vyvanse) 50 MG capsule     Sig: Take 1 capsule by mouth Every Morning     Dispense:  90 capsule     Refill:  0        Management Plan:     An After Visit Summary was printed and given to the patient at discharge.    Follow-up: Return in about 3 months (around 9/28/2024) for Recheck.    Ed Myers, DO 6/28/2024 10:13 EDT  This note was electronically signed.

## 2024-06-28 NOTE — ASSESSMENT & PLAN NOTE
He still has some daytime symptoms of difficulty voiding.  Will try doing his Hytrin twice a day and see if it helps.

## 2024-07-05 RX ORDER — OMEPRAZOLE 20 MG/1
20 CAPSULE, DELAYED RELEASE ORAL DAILY
Qty: 90 CAPSULE | Refills: 1 | Status: SHIPPED | OUTPATIENT
Start: 2024-07-05

## 2024-07-05 RX ORDER — METOPROLOL SUCCINATE 100 MG/1
100 TABLET, EXTENDED RELEASE ORAL DAILY
Qty: 90 TABLET | Refills: 1 | Status: SHIPPED | OUTPATIENT
Start: 2024-07-05

## 2024-08-05 ENCOUNTER — TELEPHONE (OUTPATIENT)
Dept: FAMILY MEDICINE CLINIC | Facility: CLINIC | Age: 44
End: 2024-08-05

## 2024-08-05 NOTE — TELEPHONE ENCOUNTER
Caller: Shiva Varela    Relationship: Self    Best call back number: [phone]     What orders are you requesting (i.e. lab or imaging): LAB ORDERS      Where will you receive your lab/imaging services:     Additional notes: PATIENT NEEDS LAB ORDER PUT IN SO HE CAN FILL OUT HIS INSURANCE PAPERWORK IN ORDER TO GET HIS YEARLY DEDUCTION.    LAB ORDERS THAT ARE NEEDED;    TOTAL CHOLESTEROL NON HDL, HDL, LDL  TRIGLYCERIDE  A1C  GLUCOSE    PLEASE MESSAGE ON [facility] WHEN ORDERS ARE IN SO HE WILL KNOW WHEN TO GO.

## 2024-08-07 DIAGNOSIS — Z00.00 ANNUAL PHYSICAL EXAM: ICD-10-CM

## 2024-08-07 DIAGNOSIS — F90.0 ATTENTION DEFICIT HYPERACTIVITY DISORDER (ADHD), PREDOMINANTLY INATTENTIVE TYPE: ICD-10-CM

## 2024-08-07 DIAGNOSIS — I10 ESSENTIAL HYPERTENSION: ICD-10-CM

## 2024-08-07 DIAGNOSIS — E78.2 MIXED HYPERLIPIDEMIA: Primary | ICD-10-CM

## 2024-08-21 RX ORDER — SERTRALINE HYDROCHLORIDE 100 MG/1
100 TABLET, FILM COATED ORAL DAILY
Qty: 90 TABLET | Refills: 1 | Status: SHIPPED | OUTPATIENT
Start: 2024-08-21

## 2024-08-27 ENCOUNTER — LAB (OUTPATIENT)
Dept: LAB | Facility: HOSPITAL | Age: 44
End: 2024-08-27
Payer: COMMERCIAL

## 2024-08-27 DIAGNOSIS — E78.2 MIXED HYPERLIPIDEMIA: ICD-10-CM

## 2024-08-27 DIAGNOSIS — F90.0 ATTENTION DEFICIT HYPERACTIVITY DISORDER (ADHD), PREDOMINANTLY INATTENTIVE TYPE: ICD-10-CM

## 2024-08-27 DIAGNOSIS — Z00.00 ANNUAL PHYSICAL EXAM: ICD-10-CM

## 2024-08-27 DIAGNOSIS — I10 ESSENTIAL HYPERTENSION: ICD-10-CM

## 2024-08-27 LAB
ALBUMIN SERPL-MCNC: 4 G/DL (ref 3.5–5.2)
ALBUMIN/GLOB SERPL: 1.5 G/DL
ALP SERPL-CCNC: 52 U/L (ref 39–117)
ALT SERPL W P-5'-P-CCNC: 22 U/L (ref 1–41)
ANION GAP SERPL CALCULATED.3IONS-SCNC: 8.9 MMOL/L (ref 5–15)
AST SERPL-CCNC: 17 U/L (ref 1–40)
BILIRUB SERPL-MCNC: 0.4 MG/DL (ref 0–1.2)
BUN SERPL-MCNC: 14 MG/DL (ref 6–20)
BUN/CREAT SERPL: 13.1 (ref 7–25)
CALCIUM SPEC-SCNC: 9 MG/DL (ref 8.6–10.5)
CHLORIDE SERPL-SCNC: 105 MMOL/L (ref 98–107)
CHOLEST SERPL-MCNC: 159 MG/DL (ref 0–200)
CO2 SERPL-SCNC: 28.1 MMOL/L (ref 22–29)
CREAT SERPL-MCNC: 1.07 MG/DL (ref 0.76–1.27)
EGFRCR SERPLBLD CKD-EPI 2021: 89.4 ML/MIN/1.73
GLOBULIN UR ELPH-MCNC: 2.6 GM/DL
GLUCOSE SERPL-MCNC: 101 MG/DL (ref 65–99)
HBA1C MFR BLD: 5.4 % (ref 4.8–5.6)
HDLC SERPL-MCNC: 39 MG/DL (ref 40–60)
LDLC SERPL CALC-MCNC: 102 MG/DL (ref 0–100)
LDLC/HDLC SERPL: 2.58 {RATIO}
POTASSIUM SERPL-SCNC: 3.8 MMOL/L (ref 3.5–5.2)
PROT SERPL-MCNC: 6.6 G/DL (ref 6–8.5)
SODIUM SERPL-SCNC: 142 MMOL/L (ref 136–145)
TRIGL SERPL-MCNC: 97 MG/DL (ref 0–150)
VLDLC SERPL-MCNC: 18 MG/DL (ref 5–40)

## 2024-08-27 PROCEDURE — 83036 HEMOGLOBIN GLYCOSYLATED A1C: CPT

## 2024-08-27 PROCEDURE — 80053 COMPREHEN METABOLIC PANEL: CPT

## 2024-08-27 PROCEDURE — 36415 COLL VENOUS BLD VENIPUNCTURE: CPT

## 2024-08-27 PROCEDURE — 80061 LIPID PANEL: CPT

## 2024-09-26 ENCOUNTER — OFFICE VISIT (OUTPATIENT)
Dept: FAMILY MEDICINE CLINIC | Facility: CLINIC | Age: 44
End: 2024-09-26
Payer: COMMERCIAL

## 2024-09-26 VITALS
TEMPERATURE: 97.4 F | DIASTOLIC BLOOD PRESSURE: 77 MMHG | HEART RATE: 87 BPM | SYSTOLIC BLOOD PRESSURE: 107 MMHG | HEIGHT: 77 IN | OXYGEN SATURATION: 96 % | WEIGHT: 264 LBS | BODY MASS INDEX: 31.17 KG/M2

## 2024-09-26 DIAGNOSIS — M53.3 TAIL BONE PAIN: Primary | ICD-10-CM

## 2024-09-26 DIAGNOSIS — M53.3 COCCYDYNIA: ICD-10-CM

## 2024-09-26 PROCEDURE — 99214 OFFICE O/P EST MOD 30 MIN: CPT | Performed by: STUDENT IN AN ORGANIZED HEALTH CARE EDUCATION/TRAINING PROGRAM

## 2024-10-03 ENCOUNTER — HOSPITAL ENCOUNTER (OUTPATIENT)
Dept: GENERAL RADIOLOGY | Facility: HOSPITAL | Age: 44
Discharge: HOME | End: 2024-10-03
Admitting: STUDENT IN AN ORGANIZED HEALTH CARE EDUCATION/TRAINING PROGRAM
Payer: COMMERCIAL

## 2024-10-03 DIAGNOSIS — M53.3 TAIL BONE PAIN: ICD-10-CM

## 2024-10-03 PROCEDURE — 72220 X-RAY EXAM SACRUM TAILBONE: CPT

## 2024-10-15 ENCOUNTER — OFFICE VISIT (OUTPATIENT)
Dept: FAMILY MEDICINE CLINIC | Facility: CLINIC | Age: 44
End: 2024-10-15
Payer: COMMERCIAL

## 2024-10-15 VITALS
TEMPERATURE: 97.1 F | BODY MASS INDEX: 30.94 KG/M2 | DIASTOLIC BLOOD PRESSURE: 74 MMHG | WEIGHT: 262 LBS | SYSTOLIC BLOOD PRESSURE: 115 MMHG | HEIGHT: 77 IN | HEART RATE: 76 BPM | OXYGEN SATURATION: 98 %

## 2024-10-15 DIAGNOSIS — Z00.00 ANNUAL PHYSICAL EXAM: ICD-10-CM

## 2024-10-15 DIAGNOSIS — F90.0 ATTENTION DEFICIT HYPERACTIVITY DISORDER (ADHD), PREDOMINANTLY INATTENTIVE TYPE: Primary | ICD-10-CM

## 2024-10-15 DIAGNOSIS — L98.9 SKIN LESION OF CHEST WALL: ICD-10-CM

## 2024-10-15 PROCEDURE — 99213 OFFICE O/P EST LOW 20 MIN: CPT | Performed by: FAMILY MEDICINE

## 2024-10-15 RX ORDER — TRIAMCINOLONE ACETONIDE 1 MG/G
1 OINTMENT TOPICAL 2 TIMES DAILY
Qty: 15 G | Refills: 0 | Status: SHIPPED | OUTPATIENT
Start: 2024-10-15

## 2024-10-15 RX ORDER — METOPROLOL SUCCINATE 100 MG/1
100 TABLET, EXTENDED RELEASE ORAL DAILY
Qty: 90 TABLET | Refills: 1 | Status: SHIPPED | OUTPATIENT
Start: 2024-10-15

## 2024-10-15 RX ORDER — LISDEXAMFETAMINE DIMESYLATE 50 MG/1
50 CAPSULE ORAL EVERY MORNING
Qty: 90 CAPSULE | Refills: 0 | Status: SHIPPED | OUTPATIENT
Start: 2024-10-15

## 2024-10-15 NOTE — PROGRESS NOTES
Chief Complaint   Patient presents with    Follow-up      3 month     ADD        Subjective     Shiva Varela  has a past medical history of Callus, Essential hypertension (04/30/2019), and Ulnar neuropathy at elbow of left upper extremity (09/10/2020).    ADD-has since her last visit he has been back on the Vyvanse.  He has been able to get it on a regular basis.  He states he is doing well on it.  He feels that he does better on that than he did on the Adderall.    Rash-he states he has had a rash on his chest for the last couple of weeks.  He states intermittently is itchy.  He is unaware of any new or different products that he uses.        PHQ-2 Depression Screening  Little interest or pleasure in doing things?     Feeling down, depressed, or hopeless?     PHQ-2 Total Score     PHQ-9 Depression Screening  Little interest or pleasure in doing things?     Feeling down, depressed, or hopeless?     Trouble falling or staying asleep, or sleeping too much?     Feeling tired or having little energy?     Poor appetite or overeating?     Feeling bad about yourself - or that you are a failure or have let yourself or your family down?     Trouble concentrating on things, such as reading the newspaper or watching television?     Moving or speaking so slowly that other people could have noticed? Or the opposite - being so fidgety or restless that you have been moving around a lot more than usual?     Thoughts that you would be better off dead, or of hurting yourself in some way?     PHQ-9 Total Score     If you checked off any problems, how difficult have these problems made it for you to do your work, take care of things at home, or get along with other people?       Allergies   Allergen Reactions    Bee Venom Unknown - Low Severity    Codeine Unknown - Low Severity    Latex Unknown - Low Severity     Persistent Contact        Prior to Admission medications    Medication Sig Start Date End Date Taking? Authorizing  Provider   fluticasone (FLONASE) 50 MCG/ACT nasal spray SPRAY 1 SPRAY INTO EACH NOSTRIL EVERY DAY 9/13/22  Yes Ed Myers DO   lisdexamfetamine (Vyvanse) 50 MG capsule Take 1 capsule by mouth Every Morning 6/28/24  Yes Ed Myers DO   metoprolol succinate XL (TOPROL-XL) 100 MG 24 hr tablet TAKE 1 TABLET BY MOUTH EVERY DAY 7/5/24  Yes Ed Myers DO   omeprazole (priLOSEC) 20 MG capsule TAKE 1 CAPSULE BY MOUTH EVERY DAY 7/5/24  Yes Ed Myers DO   primidone (MYSOLINE) 50 MG tablet Titrate as directed and increase every 3 days up to 5 tablets twice a day.  Patient taking differently: Take 1 tablet by mouth Every Night. Titrate as directed and increase every 3 days up to 5 tablets twice a day. 2/12/24  Yes Jeremy Warner MD   sertraline (ZOLOFT) 100 MG tablet TAKE 1 TABLET DAILY 8/21/24  Yes Ed Myers DO   terazosin (HYTRIN) 2 MG capsule Take 1 capsule by mouth 2 (Two) Times a Day. 6/28/24  Yes Ed Myers DO        Patient Active Problem List   Diagnosis    Attention deficit hyperactivity disorder (ADHD)    Essential hypertension    Major depressive disorder, recurrent episode, in full remission    Difficulty urinating    Benign prostatic hyperplasia with weak urinary stream    Spondylolisthesis at L5-S1 level    Abdominal hernia    Depressive disorder    Eczema    Mixed hyperlipidemia    Ulnar neuropathy at elbow of left upper extremity    Vitamin D deficiency    Insect bite of right ankle    Annual physical exam    Essential tremor    Gastroesophageal reflux disease without esophagitis    Skin pustule    Bilateral hip pain    Sensory disorder    Skin lesion of chest wall        Past Surgical History:   Procedure Laterality Date    APPENDECTOMY      HAND SURGERY      HERNIA REPAIR      KNEE ACL RECONSTRUCTION Left 2000    KNEE MENISCECTOMY  2015    lateral    WISDOM TOOTH EXTRACTION         Social History     Socioeconomic  "History    Marital status: Single   Tobacco Use    Smoking status: Never     Passive exposure: Never    Smokeless tobacco: Never   Vaping Use    Vaping status: Never Used   Substance and Sexual Activity    Alcohol use: Never    Drug use: Never    Sexual activity: Defer       Family History   Problem Relation Age of Onset    Heart disease Paternal Grandmother     Heart disease Paternal Grandfather     Cancer Paternal Grandfather     Diabetes Other        Family history, surgical history, past medical history, Allergies and meds reviewed with patient today and updated in [facility] EMR.     ROS:  Review of Systems   Constitutional:  Negative for fatigue.   Skin:  Positive for rash.   Psychiatric/Behavioral:  Negative for decreased concentration.        OBJECTIVE:  Vitals:    10/15/24 0937   BP: 115/74   BP Location: Left arm   Patient Position: Sitting   Pulse: 76   Temp: 97.1 °F (36.2 °C)   SpO2: 98%   Weight: 119 kg (262 lb)   Height: 195.6 cm (77.01\")     No results found.   Body mass index is 31.06 kg/m².  No LMP for male patient.    The 10-year ASCVD risk score (Gopi DK, et al., 2019) is: 1.3%    Values used to calculate the score:      Age: 44 years      Sex: Male      Is Non- : No      Diabetic: No      Tobacco smoker: No      Systolic Blood Pressure: 115 mmHg      Is BP treated: Yes      HDL Cholesterol: 39 mg/dL      Total Cholesterol: 159 mg/dL     Physical Exam  Vitals and nursing note reviewed.   Constitutional:       General: He is not in acute distress.     Appearance: Normal appearance. He is normal weight.   HENT:      Head: Normocephalic.   Cardiovascular:      Rate and Rhythm: Normal rate and regular rhythm.      Heart sounds: Normal heart sounds. No murmur heard.  Pulmonary:      Effort: Pulmonary effort is normal.      Breath sounds: Normal breath sounds. No wheezing or rhonchi.   Skin:         Neurological:      Mental Status: He is alert and oriented to person, place, and " time.   Psychiatric:         Mood and Affect: Mood normal.         Thought Content: Thought content normal.         Judgment: Judgment normal.          Procedures    No visits with results within 30 Day(s) from this visit.   Latest known visit with results is:   Lab on 08/27/2024   Component Date Value Ref Range Status    Total Cholesterol 08/27/2024 159  0 - 200 mg/dL Final    Triglycerides 08/27/2024 97  0 - 150 mg/dL Final    HDL Cholesterol 08/27/2024 39 (L)  40 - 60 mg/dL Final    LDL Cholesterol  08/27/2024 102 (H)  0 - 100 mg/dL Final    VLDL Cholesterol 08/27/2024 18  5 - 40 mg/dL Final    LDL/HDL Ratio 08/27/2024 2.58   Final    Hemoglobin A1C 08/27/2024 5.40  4.80 - 5.60 % Final    Glucose 08/27/2024 101 (H)  65 - 99 mg/dL Final    BUN 08/27/2024 14  6 - 20 mg/dL Final    Creatinine 08/27/2024 1.07  0.76 - 1.27 mg/dL Final    Sodium 08/27/2024 142  136 - 145 mmol/L Final    Potassium 08/27/2024 3.8  3.5 - 5.2 mmol/L Final    Chloride 08/27/2024 105  98 - 107 mmol/L Final    CO2 08/27/2024 28.1  22.0 - 29.0 mmol/L Final    Calcium 08/27/2024 9.0  8.6 - 10.5 mg/dL Final    Total Protein 08/27/2024 6.6  6.0 - 8.5 g/dL Final    Albumin 08/27/2024 4.0  3.5 - 5.2 g/dL Final    ALT (SGPT) 08/27/2024 22  1 - 41 U/L Final    AST (SGOT) 08/27/2024 17  1 - 40 U/L Final    Alkaline Phosphatase 08/27/2024 52  39 - 117 U/L Final    Total Bilirubin 08/27/2024 0.4  0.0 - 1.2 mg/dL Final    Globulin 08/27/2024 2.6  gm/dL Final    A/G Ratio 08/27/2024 1.5  g/dL Final    BUN/Creatinine Ratio 08/27/2024 13.1  7.0 - 25.0 Final    Anion Gap 08/27/2024 8.9  5.0 - 15.0 mmol/L Final    eGFR 08/27/2024 89.4  >60.0 mL/min/1.73 Final       ASSESSMENT/ PLAN:    Diagnoses and all orders for this visit:    1. Attention deficit hyperactivity disorder (ADHD), predominantly inattentive type (Primary)  Assessment & Plan:  He is doing well on his Vyvanse and current dosage.  Will continue that today.    Orders:  -     lisdexamfetamine  (Vyvanse) 50 MG capsule; Take 1 capsule by mouth Every Morning  Dispense: 90 capsule; Refill: 0    2. Skin lesion of chest wall  Assessment & Plan:  I do not think this is fungal in nature.  It may be more atopic dermatitis.  Will treat him with a course of steroid cream.  If it does not seem to get better it may need to be biopsied.      3. Annual physical exam  -     lisdexamfetamine (Vyvanse) 50 MG capsule; Take 1 capsule by mouth Every Morning  Dispense: 90 capsule; Refill: 0    Other orders  -     triamcinolone (KENALOG) 0.1 % ointment; Apply 1 Application topically to the appropriate area as directed 2 (Two) Times a Day.  Dispense: 15 g; Refill: 0  -     omeprazole (priLOSEC) 20 MG capsule; Take 1 capsule by mouth Daily.  Dispense: 90 capsule; Refill: 1  -     metoprolol succinate XL (TOPROL-XL) 100 MG 24 hr tablet; Take 1 tablet by mouth Daily.  Dispense: 90 tablet; Refill: 1               Orders Placed Today:     New Medications Ordered This Visit   Medications    triamcinolone (KENALOG) 0.1 % ointment     Sig: Apply 1 Application topically to the appropriate area as directed 2 (Two) Times a Day.     Dispense:  15 g     Refill:  0    lisdexamfetamine (Vyvanse) 50 MG capsule     Sig: Take 1 capsule by mouth Every Morning     Dispense:  90 capsule     Refill:  0    omeprazole (priLOSEC) 20 MG capsule     Sig: Take 1 capsule by mouth Daily.     Dispense:  90 capsule     Refill:  1    metoprolol succinate XL (TOPROL-XL) 100 MG 24 hr tablet     Sig: Take 1 tablet by mouth Daily.     Dispense:  90 tablet     Refill:  1        Management Plan:     An After Visit Summary was printed and given to the patient at discharge.    Follow-up: Return in about 3 months (around 1/15/2025) for Recheck.    Ed Myers DO 10/15/2024 10:02 EDT  This note was electronically signed.

## 2024-10-15 NOTE — ASSESSMENT & PLAN NOTE
I do not think this is fungal in nature.  It may be more atopic dermatitis.  Will treat him with a course of steroid cream.  If it does not seem to get better it may need to be biopsied.

## 2024-10-17 ENCOUNTER — OFFICE VISIT (OUTPATIENT)
Dept: PSYCHIATRY | Facility: CLINIC | Age: 44
End: 2024-10-17
Payer: COMMERCIAL

## 2024-10-17 VITALS
BODY MASS INDEX: 31.22 KG/M2 | HEART RATE: 79 BPM | DIASTOLIC BLOOD PRESSURE: 69 MMHG | HEIGHT: 77 IN | SYSTOLIC BLOOD PRESSURE: 138 MMHG | WEIGHT: 264.4 LBS

## 2024-10-17 DIAGNOSIS — F90.0 ATTENTION DEFICIT HYPERACTIVITY DISORDER (ADHD), PREDOMINANTLY INATTENTIVE TYPE: Primary | ICD-10-CM

## 2024-10-17 DIAGNOSIS — F84.0 AUTISM SPECTRUM DISORDER: ICD-10-CM

## 2024-10-17 NOTE — PROGRESS NOTES
"[facility]  Follow-up Visit    Chief Complaint:  \"I had a poor work review\"     History of Present Illness: Shiva Varela is an insightful and intelligent 43 y.o. male who presents today for initial evaluation regarding ASD. Shiva presents unaccompanied in no acute distress and engages with me appropriately. Psychotropic regimen with which patient presents is described as sertraline and lisdexamfetamine.      History is positive for signs/symptoms suggestive of trouble concentrating, trouble completing tasks, making errors in routine tasks, issues remembering obligations, trouble with organization, fidgeting, and associated irritability/anxiety with these deficits. Patient concerned he may be on the autism spectrum and is desiring testing and diagnostic clarity. Patient voices: enduring social deficits and related issues with interactions, emotional processing and expression, restricted and intense interests-\"super focus\", proclivity to routine \"eating routines\" and emotional dysregulation with disruption thereto, stereotypies, and general executive barriers to functionality in modern society-' picking up on social cues, and difficulty \"reading people.\" Psychiatric screening is negative for pathognomonic history of TBI, violence, and suicidality.      I have counseled the patient with regard to diagnoses (will need formal neuro psych testing, information given and the recommended treatment regimen as documented below: I will assume prescriptive responsibility for nothing at this time.  Patient acknowledges the diagnoses per my rendered interpretation. Patient demonstrates awareness/understanding of viable alternatives for treatment as well as potential risks, benefits, and side effects associated with this regimen and is amenable to proceed in this fashion.      Recommended lifestyle changes: 30 minutes of activity to increase HR 2-3 days weekly.     Psychiatric " History:  Diagnoses: ADD, depression  Outpatient history: therapy  Inpatient history: none  Medication trials: sertraline and lisdexamfetamine   Other treatment modalities: Psychotherapy  Self harm: No history  Suicide attempts: No  Abuse or neglect: None     Substance Abuse History:   Types/methods/frequency: * none  Transtheoretical stage: none     Social History:  Residence: lives alone  Vocation: yes  Source of income: Employed  Last grade completed: college  Pertinent developmental history: ADHD  Pertinent legal history: No history   Hobbies/interests: reading  Spiritism: N/A  Exercise:n/a.   Dietary habits: no pertinent issues   Sleep hygiene: no pertinent issues   Social habits: desires to be in a relationship  Sunlight: There are no concern for under-exposure.  Caffeine intake: no pertinent issues   Hydration habits: no pertinent issues    history: No            Interval History Shiva is a 44 y.o. male who presents today for follow-up. Shiva presents unaccompanied in no acute distress and engages with me appropriately. He perceives that his medication works well     Current treatment regimen includes:   - lisdexamfetamine 50 mg po daily (PCP scripts)  sertraline 100 mg po daily. (PCP scripts)  Side-effects per given history: none      Today the patient feels overwhelmed and regretful since getting his formal ASD diagnosis. He is regretful because he wishes he knew this information this earlier in life. He is struggling with how to move forward, especially with disclosure at work. He is desiring to look into accomodation for work. Patient asks many questions about cognitive ASD. He was dissatisfied with my answers, and was quick to dismiss recommendation for ASD coaching.   Shiva is keen to talk about AI and the images he created through his own machine.     Thought process and content are devoid of overt aberration suggestive of acute kevin/psychosis. The patient denies SI/HI/AVH. There are not  changes on exam today compared to most recent evaluation.    - sleep: no issues   - appetite: moderately controlled  Encouraged Shiva to reach back out the the diagnostic center to write any accomodation letters, and recommended Shiva reach out to [facility]  in [place] (Solomon Reyes [phone]) which he could see virtually. Shiva declined, Shiva was agreeable to see therapist is area-see below. Shiva agreed to follow up as needed, if mood/condition worsens.   I have counseled the patient with regard to diagnoses and the recommended treatment regimen as documented below. Patient acknowledges the diagnoses per my rendered interpretation. Patient demonstrates understanding of potential risks/benefits/side effects associated with this regimen and is amenable to proceed in this fashion.     Assignment: begin journaling instances of overwhelm, response thereto, ideal response to cultivate insight and begin breaking a pattern of stimulus/response.      Social History     Socioeconomic History   • Marital status: Single   Tobacco Use   • Smoking status: Never     Passive exposure: Never   • Smokeless tobacco: Never   Vaping Use   • Vaping status: Never Used   Substance and Sexual Activity   • Alcohol use: Never   • Drug use: Never   • Sexual activity: Defer       Tobacco use counseling/intervention: patient does not use tobacco.     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    Current Medications:   Current Outpatient Medications   Medication Sig Dispense Refill   • fluticasone (FLONASE) 50 MCG/ACT nasal spray SPRAY 1 SPRAY INTO EACH NOSTRIL EVERY DAY 48 mL 5   • lisdexamfetamine (Vyvanse) 50 MG capsule Take 1 capsule by mouth Every Morning 90 capsule 0   • metoprolol succinate XL (TOPROL-XL) 100 MG 24 hr tablet Take 1 tablet by mouth Daily. 90 tablet 1   • omeprazole (priLOSEC) 20 MG capsule Take 1 capsule by mouth Daily. 90 capsule 1   • primidone (MYSOLINE) 50 MG tablet Titrate as directed and increase every 3 days up to 5 tablets twice a day. (Patient taking differently: Take 1 tablet by mouth Every Night. Titrate as directed and increase every 3 days up to 5 tablets twice a day.) 300 tablet 2   • sertraline (ZOLOFT) 100 MG tablet TAKE 1 TABLET DAILY 90 tablet 1   • terazosin (HYTRIN) 2 MG capsule Take 1 capsule by mouth 2 (Two) Times a Day. 180 capsule 1   • triamcinolone (KENALOG) 0.1 % ointment Apply 1 Application topically to the appropriate area as directed 2 (Two) Times a Day. 15 g 0     No current facility-administered medications for this visit.     Past Medical History:  Past Medical History:   Diagnosis Date   • Autism spectrum disorder    • Callus    • Essential hypertension 04/30/2019   • 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 IF it persists then EMG     Past Surgical History:  Past Surgical History:   Procedure Laterality Date   • APPENDECTOMY     • HAND SURGERY     • HERNIA REPAIR     • KNEE ACL RECONSTRUCTION Left  "2000   • KNEE MENISCECTOMY  2015    lateral   • WISDOM TOOTH EXTRACTION         MENTAL STATUS EXAM   General Appearance:  Cleanly groomed and dressed and well developed  Eye Contact:  Good eye contact  Attitude:  Cooperative, polite and candid  Motor Activity:  Normal gait, posture  Muscle Strength:  Normal  Speech:  Normal rate, tone, volume  Language:  Spontaneous  Mood and affect:  Normal, pleasant  Hopelessness:  Denies  Loneliness: Denies  Thought Process:  Logical and goal-directed  Associations/ Thought Content:  No delusions  Hallucinations:  None  Suicidal Ideations:  Not present  Homicidal Ideation:  Not present  Sensorium:  Alert and clear  Orientation:  Person, place, time and situation  Immediate Recall, Recent, and Remote Memory:  Intact      Vital Signs:   /69   Pulse 79   Ht 195.6 cm (77\")   Wt 120 kg (264 lb 6.4 oz)   BMI 31.35 kg/m²    Lab Results:   Lab on 08/27/2024   Component Date Value Ref Range Status   • Total Cholesterol 08/27/2024 159  0 - 200 mg/dL Final   • Triglycerides 08/27/2024 97  0 - 150 mg/dL Final   • HDL Cholesterol 08/27/2024 39 (L)  40 - 60 mg/dL Final   • LDL Cholesterol  08/27/2024 102 (H)  0 - 100 mg/dL Final   • VLDL Cholesterol 08/27/2024 18  5 - 40 mg/dL Final   • LDL/HDL Ratio 08/27/2024 2.58   Final   • Hemoglobin A1C 08/27/2024 5.40  4.80 - 5.60 % Final   • Glucose 08/27/2024 101 (H)  65 - 99 mg/dL Final   • BUN 08/27/2024 14  6 - 20 mg/dL Final   • Creatinine 08/27/2024 1.07  0.76 - 1.27 mg/dL Final   • Sodium 08/27/2024 142  136 - 145 mmol/L Final   • Potassium 08/27/2024 3.8  3.5 - 5.2 mmol/L Final   • Chloride 08/27/2024 105  98 - 107 mmol/L Final   • CO2 08/27/2024 28.1  22.0 - 29.0 mmol/L Final   • Calcium 08/27/2024 9.0  8.6 - 10.5 mg/dL Final   • Total Protein 08/27/2024 6.6  6.0 - 8.5 g/dL Final   • Albumin 08/27/2024 4.0  3.5 - 5.2 g/dL Final   • ALT (SGPT) 08/27/2024 22  1 - 41 U/L Final   • AST (SGOT) 08/27/2024 17  1 - 40 U/L Final   • Alkaline " Phosphatase 08/27/2024 52  39 - 117 U/L Final   • Total Bilirubin 08/27/2024 0.4  0.0 - 1.2 mg/dL Final   • Globulin 08/27/2024 2.6  gm/dL Final   • A/G Ratio 08/27/2024 1.5  g/dL Final   • BUN/Creatinine Ratio 08/27/2024 13.1  7.0 - 25.0 Final   • Anion Gap 08/27/2024 8.9  5.0 - 15.0 mmol/L Final   • eGFR 08/27/2024 89.4  >60.0 mL/min/1.73 Final       ASSESSMENT AND PLAN:    ICD-10-CM ICD-9-CM   1. Attention deficit hyperactivity disorder (ADHD), predominantly inattentive type  F90.0 314.00   2. Autism spectrum disorder  F84.0 299.00       Shiva is a 44 y.o. male who presents today for follow-up regarding medication management. We have discussed the interval history and the treatment plan below, including potential R/B/SE of the recommended regimen of which the patient demonstrates understanding. Patient is agreeable to call 911 or go to the nearest ER should he become concerned for his own safety and/or the safety of those around him. There are are no overt indices of acute kevin/psychosis on exam today. LUCIANO reviewed and is as expected.    Medication regimen: continue lisdexamfetamine 50 mg po q AM (scripted by PCP), and sertraline 100 mg po q day (scripted by PCP); patient is advised not to misuse prescribed medications or to use them with any exogenous substances that aren't disclosed to this provider as they may interact with the regimen to the patient's detriment.   Risk Assessment: protracted risk is moderate, imminent risk is moderate.  Do note that this is subject to change with the Catholic of new stressors, treatment non-adherence, use of substances, and/or new medical ails.   Monitoring: reviewed labs/imaging as populated above; ordered  Therapy: referred to [facility] recommend ASD  in [place] (Solomon Reyes [phone] [phone])  Follow-up: as needed  Communications: N/A    TREATMENT PLAN/GOALS: challenge patterns of living conducive to symptom burden, implement recommended regimen as  above with augmentative, intermittent supportive psychotherapy to reduce symptom burden. Patient acknowledged and verbally consented to continue treatment. The importance of adherence to the recommended treatment and interval follow-up appointments was again emphasized today: patient has good treatment adherence per given history. Patient was today reminded to limit daily caffeine intake, hydrate appropriately, eat healthy and nutritious foods, engage sleep hygiene measures, engage appropriate exposure to sunlight, engage with hobbies in balance with life necessities, and exercise appropriate to their capacity to do so.         Parts of this note are electronic transcriptions/translations of spoken language to printed text using the Dragon Dictation system.    Electronically signed by SHAAN Cole, 10/17/24

## 2024-10-21 ENCOUNTER — TELEPHONE (OUTPATIENT)
Dept: FAMILY MEDICINE CLINIC | Facility: CLINIC | Age: 44
End: 2024-10-21
Payer: COMMERCIAL

## 2024-10-21 NOTE — PROGRESS NOTES
"Progress Note    Date: 10/22/2024  Time In: 9:00  Time Out: 10:00    Patient Legal Name: Shiva Varela  Patient Age: 44 y.o.  Referring Provider:   Yesica Cancino, Aprmisty  [address]  [address]  [place] [facility] [place] [address]     Mode of visit: Video  Location of provider: [address], [place] [address]  Location of patient: home    Patient is seen remotely using [place]. Patient is being seen via telehealth and patient confirms that they are in a secure environment for this session. The patient's condition being diagnosed/treated is appropriate for telemedicine. The provider identified herself as well as her credentials. The patient gave consent to be seen remotely, and when consent is given they understand that the consent allows for patient identifiable information to be sent to a third party as needed. They may refuse to be seen remotely at any time. The electronic data is encrypted and password protected, and the patient has been advised of the potential risks to privacy not withstanding such measures. Patient consented to the use of video for the purpose of a telehealth session today. The visit included audio and video interaction. No technical issues occurred during this visit.    CHIEF COMPLAINT: ASD    Subjective   History of Present Illness     Shiva is a 44 y.o. male presenting for initial psychotherapy session with this therapist. Patient reported he was recently diagnosed with ASD.  Patient stated he has gotten low performance reviews since working at his current job and it caused concern and compelled him to be be tested for ASD.  Patient shared his concerns about how to proceed at work now that he is diagnosed with ASD. Patient advised he works remotely as a  for an [employer] company.  Patient reported he struggles with \"rigid thinking\" and  has difficulty seeing \"the whole picture\" at times. Patient shared he sometimes has difficulty explaining things to others and managing " social situations. Patient described feeling he has missed out on a lot of opportunities by not being diagnosed sooner. Patient is voluntarily requesting to participate in outpatient therapy at [facility].      History obtained from referring provider's note on 10/17/24:  Psychiatric History:  Diagnoses: ADD, depression  Outpatient history: therapy  Inpatient history: none  Medication trials: sertraline and lisdexamfetamine   Other treatment modalities: Psychotherapy  Self harm: No history  Suicide attempts: No  Abuse or neglect: None      Assessment    Mental Status Exam     Appearance: dressed appropriately for the weather and appeared to have good hygiene  Behavior: calm  Cooperation:  engaged, cooperative, attentive, and friendly  Eye Contact:  fair  Affect:  congruent  Mood: expressive  Speech: talkative  Thought Process:  organized  Thought Content: appropriate  Suicidal: denies  Homicidal:  denies  Hallucinations:  denies  Memory:  intact  Orientation:  person, place, time, and situation  Reliability:  reliable  Insight:  good  Judgment:  good    Clinical Intervention       ICD-10-CM ICD-9-CM   1. Autism spectrum disorder  F84.0 299.00        Individual psychotherapy was provided utilizing person-centered techniques to build rapport, encourage expression of thoughts and feelings, support self-esteem, assess symptoms, and gather history. Allowed patient to freely discuss issues without interruption or judgement with unconditional positive regard, active listening skills, and empathy. Therapist utilized open-ended questions to encourage the development of a positive therapeutic relationship and open communication.  Therapist normalized/validated patient’s thoughts and feelings as appropriate.     Plan   Plan & Goals     Patient is seeking an autism  but may reach back out for services.    Patient acknowledged and verbally consented to continue working toward resolving current treatment  plan goals and was educated on the importance of participation in the therapeutic process.  Patient will remain compliant with medication regimen as prescribed. Discuss any medication side effects, questions or concerns with prescribing provider.  Call 911 or present to the nearest emergency room in an emergency situation.  National Suicide Prevention Lifeline: Call 988. The Lifeline provides 24/7, free and confidential support for people in distress, prevention and crisis resources.  Crisis Text Line  Text HOME To 479290    Return if symptoms worsen or fail to improve.    ____________________  This document has been electronically signed by Cathy Obando LCSW  October 22, 2024 11:26 EDT    Part of this note may be an electronic transcription/translation of spoken language to printed text using the Dragon Dictation System.

## 2024-10-21 NOTE — TELEPHONE ENCOUNTER
Shiva called stating he accidentally took 2 doses of metoprolol, vyvanse, and omeprazole. Went and spoke to dr tran, told me to advise to pt to monitor heart rate and blood pressure within the next 24 hours. Advised per chelsea that if his heart rate goes above 100 and if top number of blood pressure is below 100 to contact our office or seek to ER. Advised to pt if he develops any chest discomfort he should also go to the ED. Patient verbalized understanding via phone.

## 2024-10-22 ENCOUNTER — TELEMEDICINE (OUTPATIENT)
Dept: BEHAVIORAL HEALTH | Facility: CLINIC | Age: 44
End: 2024-10-22
Payer: COMMERCIAL

## 2024-10-22 DIAGNOSIS — F84.0 AUTISM SPECTRUM DISORDER: Primary | ICD-10-CM

## 2024-10-28 ENCOUNTER — TELEPHONE (OUTPATIENT)
Dept: PSYCHIATRY | Facility: CLINIC | Age: 44
End: 2024-10-28
Payer: COMMERCIAL

## 2024-11-27 ENCOUNTER — OFFICE VISIT (OUTPATIENT)
Dept: FAMILY MEDICINE CLINIC | Facility: CLINIC | Age: 44
End: 2024-11-27
Payer: COMMERCIAL

## 2024-11-27 ENCOUNTER — LAB (OUTPATIENT)
Dept: LAB | Facility: HOSPITAL | Age: 44
End: 2024-11-27
Payer: COMMERCIAL

## 2024-11-27 VITALS
OXYGEN SATURATION: 97 % | HEART RATE: 72 BPM | SYSTOLIC BLOOD PRESSURE: 120 MMHG | HEIGHT: 77 IN | TEMPERATURE: 96.3 F | WEIGHT: 260 LBS | BODY MASS INDEX: 30.7 KG/M2 | DIASTOLIC BLOOD PRESSURE: 76 MMHG

## 2024-11-27 DIAGNOSIS — R42 DIZZINESS: Primary | ICD-10-CM

## 2024-11-27 LAB
ANION GAP SERPL CALCULATED.3IONS-SCNC: 5.7 MMOL/L (ref 5–15)
BUN SERPL-MCNC: 16 MG/DL (ref 6–20)
BUN/CREAT SERPL: 14.5 (ref 7–25)
CALCIUM SPEC-SCNC: 9.3 MG/DL (ref 8.6–10.5)
CHLORIDE SERPL-SCNC: 104 MMOL/L (ref 98–107)
CO2 SERPL-SCNC: 28.3 MMOL/L (ref 22–29)
CREAT SERPL-MCNC: 1.1 MG/DL (ref 0.76–1.27)
DEPRECATED RDW RBC AUTO: 38 FL (ref 37–54)
EGFRCR SERPLBLD CKD-EPI 2021: 86 ML/MIN/1.73
ERYTHROCYTE [DISTWIDTH] IN BLOOD BY AUTOMATED COUNT: 11.5 % (ref 12.3–15.4)
GLUCOSE SERPL-MCNC: 76 MG/DL (ref 65–99)
HCT VFR BLD AUTO: 44.6 % (ref 37.5–51)
HGB BLD-MCNC: 14.8 G/DL (ref 13–17.7)
MCH RBC QN AUTO: 30.1 PG (ref 26.6–33)
MCHC RBC AUTO-ENTMCNC: 33.2 G/DL (ref 31.5–35.7)
MCV RBC AUTO: 90.7 FL (ref 79–97)
PLATELET # BLD AUTO: 214 10*3/MM3 (ref 140–450)
PMV BLD AUTO: 9.7 FL (ref 6–12)
POTASSIUM SERPL-SCNC: 4.5 MMOL/L (ref 3.5–5.2)
RBC # BLD AUTO: 4.92 10*6/MM3 (ref 4.14–5.8)
SODIUM SERPL-SCNC: 138 MMOL/L (ref 136–145)
TSH SERPL DL<=0.05 MIU/L-ACNC: 0.85 UIU/ML (ref 0.27–4.2)
WBC NRBC COR # BLD AUTO: 5.82 10*3/MM3 (ref 3.4–10.8)

## 2024-11-27 PROCEDURE — 99213 OFFICE O/P EST LOW 20 MIN: CPT | Performed by: FAMILY MEDICINE

## 2024-11-27 PROCEDURE — 84443 ASSAY THYROID STIM HORMONE: CPT | Performed by: FAMILY MEDICINE

## 2024-11-27 PROCEDURE — 85027 COMPLETE CBC AUTOMATED: CPT | Performed by: FAMILY MEDICINE

## 2024-11-27 PROCEDURE — 80048 BASIC METABOLIC PNL TOTAL CA: CPT | Performed by: FAMILY MEDICINE

## 2024-11-27 NOTE — ASSESSMENT & PLAN NOTE
He recently has some ongoing dizziness or lightheadedness.  This could be due to potentially related to his medication.  His main medicines that could be potentially related to this #1 would be the terazosin and or #2 his Mysoline.  Currently he only takes both of those medicines at bedtime.  He may want to try holding his Hytrin for a week or 2 and see if his symptoms are improved.  In the meantime we will check some additional labs to make sure there is no underlying etiology.

## 2024-11-27 NOTE — PROGRESS NOTES
"Chief Complaint   Patient presents with    Dizziness     Stated is going on for about a month - stated it is a \"sedation dizzy\".         Subjective     Shiva Varela  has a past medical history of Autism spectrum disorder, Callus, Essential hypertension (04/30/2019), and Ulnar neuropathy at elbow of left upper extremity (09/10/2020).    Dizziness-he states he has had these episodes of feeling dizzy now for about a month or so.  He describes it more as feeling lightheaded.  He states they will come on suddenly and lasts for 1 to 2 hours and then resolves spontaneously.  After they resolve he usually feels fine.  He is unaware of any new or different changes in medications or his health.  He has not had any change in his bowel habits without any bloody or melanotic stools.        PHQ-2 Depression Screening  Little interest or pleasure in doing things?     Feeling down, depressed, or hopeless?     PHQ-2 Total Score     PHQ-9 Depression Screening  Little interest or pleasure in doing things?     Feeling down, depressed, or hopeless?     Trouble falling or staying asleep, or sleeping too much?     Feeling tired or having little energy?     Poor appetite or overeating?     Feeling bad about yourself - or that you are a failure or have let yourself or your family down?     Trouble concentrating on things, such as reading the newspaper or watching television?     Moving or speaking so slowly that other people could have noticed? Or the opposite - being so fidgety or restless that you have been moving around a lot more than usual?     Thoughts that you would be better off dead, or of hurting yourself in some way?     PHQ-9 Total Score     If you checked off any problems, how difficult have these problems made it for you to do your work, take care of things at home, or get along with other people?       Allergies   Allergen Reactions    Bee Venom Unknown - Low Severity    Codeine Unknown - Low Severity    Latex Unknown - " Low Severity     Persistent Contact        Prior to Admission medications    Medication Sig Start Date End Date Taking? Authorizing Provider   lisdexamfetamine (Vyvanse) 50 MG capsule Take 1 capsule by mouth Every Morning 10/15/24  Yes Ed Myers DO   metoprolol succinate XL (TOPROL-XL) 100 MG 24 hr tablet Take 1 tablet by mouth Daily. 10/15/24  Yes Ed Myers DO   omeprazole (priLOSEC) 20 MG capsule Take 1 capsule by mouth Daily. 10/15/24  Yes Ed Myers DO   primidone (MYSOLINE) 50 MG tablet Titrate as directed and increase every 3 days up to 5 tablets twice a day.  Patient taking differently: Take 1 tablet by mouth Every Night. Titrate as directed and increase every 3 days up to 5 tablets twice a day. 2/12/24  Yes Jeremy Warner MD   sertraline (ZOLOFT) 100 MG tablet TAKE 1 TABLET DAILY 8/21/24  Yes Ed Myers DO   terazosin (HYTRIN) 2 MG capsule Take 1 capsule by mouth 2 (Two) Times a Day. 6/28/24  Yes Ed Myers DO   triamcinolone (KENALOG) 0.1 % ointment Apply 1 Application topically to the appropriate area as directed 2 (Two) Times a Day. 10/15/24  Yes Ed Myers DO   fluticasone (FLONASE) 50 MCG/ACT nasal spray SPRAY 1 SPRAY INTO EACH NOSTRIL EVERY DAY  Patient not taking: Reported on 11/27/2024 9/13/22   Ed Myers DO        Patient Active Problem List   Diagnosis    Attention deficit hyperactivity disorder (ADHD)    Essential hypertension    Major depressive disorder, recurrent episode, in full remission    Difficulty urinating    Benign prostatic hyperplasia with weak urinary stream    Spondylolisthesis at L5-S1 level    Abdominal hernia    Depressive disorder    Eczema    Mixed hyperlipidemia    Ulnar neuropathy at elbow of left upper extremity    Vitamin D deficiency    Insect bite of right ankle    Annual physical exam    Essential tremor    Gastroesophageal reflux disease without esophagitis     "Skin pustule    Bilateral hip pain    Sensory disorder    Skin lesion of chest wall    Dizziness        Past Surgical History:   Procedure Laterality Date    APPENDECTOMY      HAND SURGERY      HERNIA REPAIR      KNEE ACL RECONSTRUCTION Left 2000    KNEE MENISCECTOMY  2015    lateral    WISDOM TOOTH EXTRACTION         Social History     Socioeconomic History    Marital status: Single   Tobacco Use    Smoking status: Never     Passive exposure: Never    Smokeless tobacco: Never   Vaping Use    Vaping status: Never Used   Substance and Sexual Activity    Alcohol use: Never    Drug use: Never    Sexual activity: Defer       Family History   Problem Relation Age of Onset    Heart disease Paternal Grandmother     Heart disease Paternal Grandfather     Cancer Paternal Grandfather     Diabetes Other        Family history, surgical history, past medical history, Allergies and meds reviewed with patient today and updated in [facility] EMR.     ROS:  Review of Systems   Constitutional:  Negative for fatigue.   HENT:  Negative for congestion and postnasal drip.    Respiratory:  Negative for cough, chest tightness, shortness of breath and wheezing.    Cardiovascular:  Negative for chest pain and palpitations.   Gastrointestinal:  Negative for blood in stool, constipation and diarrhea.   Neurological:  Positive for dizziness and light-headedness. Negative for headache.       OBJECTIVE:  Vitals:    11/27/24 0935   BP: 120/76   BP Location: Left arm   Patient Position: Sitting   Cuff Size: Large Adult   Pulse: 72   Temp: 96.3 °F (35.7 °C)   TempSrc: Temporal   SpO2: 97%   Weight: 118 kg (260 lb)   Height: 195.6 cm (77\")     No results found.   Body mass index is 30.83 kg/m².  No LMP for male patient.    The 10-year ASCVD risk score (Gopi HILLMAN, et al., 2019) is: 1.4%    Values used to calculate the score:      Age: 44 years      Sex: Male      Is Non- : No      Diabetic: No      Tobacco smoker: No      Systolic " Blood Pressure: 120 mmHg      Is BP treated: Yes      HDL Cholesterol: 39 mg/dL      Total Cholesterol: 159 mg/dL     Physical Exam  Vitals and nursing note reviewed.   Constitutional:       General: He is not in acute distress.     Appearance: Normal appearance. He is normal weight.   HENT:      Head: Normocephalic.      Right Ear: Tympanic membrane, ear canal and external ear normal.      Left Ear: Tympanic membrane, ear canal and external ear normal.      Nose: Nose normal.      Mouth/Throat:      Mouth: Mucous membranes are moist.      Pharynx: Oropharynx is clear.   Eyes:      General: No scleral icterus.     Conjunctiva/sclera: Conjunctivae normal.      Pupils: Pupils are equal, round, and reactive to light.   Neck:      Vascular: Normal carotid pulses. No carotid bruit.   Cardiovascular:      Rate and Rhythm: Normal rate and regular rhythm.      Pulses: Normal pulses.      Heart sounds: Normal heart sounds. No murmur heard.  Pulmonary:      Effort: Pulmonary effort is normal.      Breath sounds: Normal breath sounds. No wheezing, rhonchi or rales.   Musculoskeletal:      Cervical back: Neck supple. No rigidity or tenderness.   Lymphadenopathy:      Cervical: No cervical adenopathy.   Skin:     General: Skin is warm and dry.      Coloration: Skin is not jaundiced.      Findings: No rash.   Neurological:      General: No focal deficit present.      Mental Status: He is alert and oriented to person, place, and time.   Psychiatric:         Mood and Affect: Mood normal.         Thought Content: Thought content normal.         Judgment: Judgment normal.         Procedures    No visits with results within 30 Day(s) from this visit.   Latest known visit with results is:   Lab on 08/27/2024   Component Date Value Ref Range Status    Total Cholesterol 08/27/2024 159  0 - 200 mg/dL Final    Triglycerides 08/27/2024 97  0 - 150 mg/dL Final    HDL Cholesterol 08/27/2024 39 (L)  40 - 60 mg/dL Final    LDL Cholesterol   08/27/2024 102 (H)  0 - 100 mg/dL Final    VLDL Cholesterol 08/27/2024 18  5 - 40 mg/dL Final    LDL/HDL Ratio 08/27/2024 2.58   Final    Hemoglobin A1C 08/27/2024 5.40  4.80 - 5.60 % Final    Glucose 08/27/2024 101 (H)  65 - 99 mg/dL Final    BUN 08/27/2024 14  6 - 20 mg/dL Final    Creatinine 08/27/2024 1.07  0.76 - 1.27 mg/dL Final    Sodium 08/27/2024 142  136 - 145 mmol/L Final    Potassium 08/27/2024 3.8  3.5 - 5.2 mmol/L Final    Chloride 08/27/2024 105  98 - 107 mmol/L Final    CO2 08/27/2024 28.1  22.0 - 29.0 mmol/L Final    Calcium 08/27/2024 9.0  8.6 - 10.5 mg/dL Final    Total Protein 08/27/2024 6.6  6.0 - 8.5 g/dL Final    Albumin 08/27/2024 4.0  3.5 - 5.2 g/dL Final    ALT (SGPT) 08/27/2024 22  1 - 41 U/L Final    AST (SGOT) 08/27/2024 17  1 - 40 U/L Final    Alkaline Phosphatase 08/27/2024 52  39 - 117 U/L Final    Total Bilirubin 08/27/2024 0.4  0.0 - 1.2 mg/dL Final    Globulin 08/27/2024 2.6  gm/dL Final    A/G Ratio 08/27/2024 1.5  g/dL Final    BUN/Creatinine Ratio 08/27/2024 13.1  7.0 - 25.0 Final    Anion Gap 08/27/2024 8.9  5.0 - 15.0 mmol/L Final    eGFR 08/27/2024 89.4  >60.0 mL/min/1.73 Final       ASSESSMENT/ PLAN:    Diagnoses and all orders for this visit:    1. Dizziness (Primary)  Assessment & Plan:  He recently has some ongoing dizziness or lightheadedness.  This could be due to potentially related to his medication.  His main medicines that could be potentially related to this #1 would be the terazosin and or #2 his Mysoline.  Currently he only takes both of those medicines at bedtime.  He may want to try holding his Hytrin for a week or 2 and see if his symptoms are improved.  In the meantime we will check some additional labs to make sure there is no underlying etiology.    Orders:  -     CBC (No Diff)  -     Basic Metabolic Panel  -     TSH Rfx On Abnormal To Free T4               Orders Placed Today:     No orders of the defined types were placed in this encounter.        Management Plan:     An After Visit Summary was printed and given to the patient at discharge.    Follow-up: Return in about 4 weeks (around 12/25/2024) for Recheck.    Ed Myers DO 11/27/2024 10:05 EST  This note was electronically signed.

## 2024-12-09 ENCOUNTER — OFFICE VISIT (OUTPATIENT)
Dept: FAMILY MEDICINE CLINIC | Facility: CLINIC | Age: 44
End: 2024-12-09
Payer: COMMERCIAL

## 2024-12-09 VITALS
HEART RATE: 76 BPM | SYSTOLIC BLOOD PRESSURE: 128 MMHG | OXYGEN SATURATION: 99 % | HEIGHT: 77 IN | DIASTOLIC BLOOD PRESSURE: 81 MMHG | BODY MASS INDEX: 30.46 KG/M2 | WEIGHT: 258 LBS

## 2024-12-09 DIAGNOSIS — K64.5 THROMBOSED EXTERNAL HEMORRHOID: ICD-10-CM

## 2024-12-09 DIAGNOSIS — K64.4 EXTERNAL HEMORRHOID: Primary | ICD-10-CM

## 2024-12-09 PROCEDURE — 99214 OFFICE O/P EST MOD 30 MIN: CPT | Performed by: NURSE PRACTITIONER

## 2024-12-09 RX ORDER — HYDROCORTISONE ACETATE PRAMOXINE HCL 2.5; 1 G/100G; G/100G
CREAM TOPICAL 3 TIMES DAILY
Qty: 30 G | Refills: 1 | Status: SHIPPED | OUTPATIENT
Start: 2024-12-09

## 2024-12-09 RX ORDER — TRIAMCINOLONE ACETONIDE 1 MG/G
1 OINTMENT TOPICAL 2 TIMES DAILY
Qty: 15 G | Refills: 0 | Status: SHIPPED | OUTPATIENT
Start: 2024-12-09

## 2024-12-09 NOTE — PROGRESS NOTES
Chief Complaint  Hemorrhoids (Started Thursday/)    Subjective          Shiva Varela is a 44 y.o. male who presents to [facility]    Hemorrhoids    Complains of hemorrhoids being irritated and inflamed since Thursday.  Noticed it last Thursday, had a small amount of blood in stool.  Hemorrhoids on left side.    PHQ-2 Total Score:     PHQ-9 Total Score:         Review of Systems   Gastrointestinal:  Positive for hemorrhoids.          Medical History: has a past medical history of Autism spectrum disorder, Callus, Essential hypertension (04/30/2019), and Ulnar neuropathy at elbow of left upper extremity (09/10/2020).     Surgical History: has a past surgical history that includes Anterior cruciate ligament repair (Left, 2000); Appendectomy; Hand surgery; Hernia repair; Knee Meniscectomy (2015); and [place] tooth extraction.     Family History: family history includes Cancer in his paternal grandfather; Diabetes in an other family member; Heart disease in his paternal grandfather and paternal grandmother.     Social History: reports that he has never smoked. He has never been exposed to tobacco smoke. He has never used smokeless tobacco. He reports that he does not drink alcohol and does not use drugs.    Allergies: Bee venom, Codeine, and Latex      Health Maintenance Due   Topic Date Due    ANNUAL PHYSICAL  07/28/2023            Current Outpatient Medications:     lisdexamfetamine (Vyvanse) 50 MG capsule, Take 1 capsule by mouth Every Morning, Disp: 90 capsule, Rfl: 0    metoprolol succinate XL (TOPROL-XL) 100 MG 24 hr tablet, Take 1 tablet by mouth Daily., Disp: 90 tablet, Rfl: 1    omeprazole (priLOSEC) 20 MG capsule, Take 1 capsule by mouth Daily., Disp: 90 capsule, Rfl: 1    primidone (MYSOLINE) 50 MG tablet, Titrate as directed and increase every 3 days up to 5 tablets twice a day. (Patient taking differently: Take 1 tablet by mouth Every Night. Titrate as directed and  "increase every 3 days up to 5 tablets twice a day.), Disp: 300 tablet, Rfl: 2    sertraline (ZOLOFT) 100 MG tablet, TAKE 1 TABLET DAILY, Disp: 90 tablet, Rfl: 1    triamcinolone (KENALOG) 0.1 % ointment, Apply 1 Application topically to the appropriate area as directed 2 (Two) Times a Day., Disp: 15 g, Rfl: 0    Hydrocort-Pramoxine, Perianal, (ANALPRAM-HC) 2.5-1 % rectal cream, Insert  into the rectum 3 (Three) Times a Day., Disp: 30 g, Rfl: 1      Immunization History   Administered Date(s) Administered    COVID-19 (PFIZER) Purple Cap Monovalent 06/02/2021, 06/23/2021    Flublok 18+yrs 11/13/2022    Fluzone (or Fluarix & Flulaval for VFC) >6mos 11/01/2017    Fluzone Quad >6mos (Multi-dose) 10/01/2020    Influenza Injectable Mdck Pf Quad 09/25/2020    Influenza, Unspecified 09/25/2020         Objective       Vitals:    12/09/24 1358   BP: 128/81   Pulse: 76   SpO2: 99%   Weight: 117 kg (258 lb)   Height: 195.6 cm (77\")      Body mass index is 30.59 kg/m².   Wt Readings from Last 3 Encounters:   12/09/24 117 kg (258 lb)   11/27/24 118 kg (260 lb)   10/17/24 120 kg (264 lb 6.4 oz)      BP Readings from Last 3 Encounters:   12/09/24 128/81   11/27/24 120/76   10/17/24 138/69                Physical Exam  Constitutional:       Appearance: Normal appearance. He is normal weight.   HENT:      Head: Normocephalic and atraumatic.   Genitourinary:     Comments: Soft flat hemorrohid at 5 oclock, 11 o'clock purple colored tender small marble size hemorrhoid  Skin:     General: Skin is warm and dry.   Neurological:      General: No focal deficit present.      Mental Status: He is alert and oriented to person, place, and time.   Psychiatric:         Mood and Affect: Mood normal.         Behavior: Behavior normal.         Thought Content: Thought content normal.         Judgment: Judgment normal.       Result Review :       Common labs          6/28/2024    10:32 8/27/2024    09:24 11/27/2024    10:35   Common Labs   Glucose 65  " 101  76    BUN 16  14  16    Creatinine 1.06  1.07  1.10    Sodium 139  142  138    Potassium 4.1  3.8  4.5    Chloride 102  105  104    Calcium 9.1  9.0  9.3    Albumin 4.2  4.0     Total Bilirubin 0.3  0.4     Alkaline Phosphatase 54  52     AST (SGOT) 21  17     ALT (SGPT) 17  22     WBC   5.82    Hemoglobin   14.8    Hematocrit   44.6    Platelets   214    Total Cholesterol 160  159     Triglycerides 119  97     HDL Cholesterol 44  39     LDL Cholesterol  95  102     Hemoglobin A1C  5.40               Assessment and Plan        Diagnoses and all orders for this visit:    1. External hemorrhoid (Primary)  -     Hydrocort-Pramoxine, Perianal, (ANALPRAM-HC) 2.5-1 % rectal cream; Insert  into the rectum 3 (Three) Times a Day.  Dispense: 30 g; Refill: 1    2. Thrombosed external hemorrhoid  -     Ambulatory Referral to General Surgery          Follow Up     Return for Follow up with Dr Myers.    Patient was given instructions and counseling regarding his condition or for health maintenance advice. Please see specific information pulled into the AVS if appropriate. Patient understands the importance of having any ordered tests to be performed in a timely fashion.      I spent 16 minutes caring for Shiva on this date of service. This time includes time spent by me in the following activities: preparing for the visit, reviewing tests, performing a medically appropriate examination and/or evaluation, counseling and educating the patient/family/caregiver, referring and communicating with other health care professionals, documenting information in the medical record, independently interpreting results and communicating that information with the patient/family/caregiver, care coordination, ordering medications, and ordering test(s)      SHAAN Rose

## 2024-12-10 ENCOUNTER — OFFICE VISIT (OUTPATIENT)
Dept: SURGERY | Facility: CLINIC | Age: 44
End: 2024-12-10
Payer: COMMERCIAL

## 2024-12-10 VITALS — HEIGHT: 77 IN | BODY MASS INDEX: 30.34 KG/M2 | RESPIRATION RATE: 20 BRPM | WEIGHT: 257 LBS

## 2024-12-10 DIAGNOSIS — K64.8 OTHER HEMORRHOIDS: Primary | ICD-10-CM

## 2024-12-10 PROCEDURE — 99204 OFFICE O/P NEW MOD 45 MIN: CPT | Performed by: SURGERY

## 2024-12-10 NOTE — PROGRESS NOTES
General Surgery/Colorectal Surgery Note    Patient Name:  Shiva Varela  YOB: 1980  [id number]    Referring Provider: Scarlet Mike APRN      Patient Care Team:  Ed Myers DO as PCP - General (Family Medicine)    Chief complaint hemorrhoids    Subjective .     History of present illness:    12-year history of hemorrhoid symptoms.  He has a hemorrhoid skin tag which makes it difficult to clean.  He says this is slowly increased in size.  He also has some swelling to the outside of his anus with minimal improvement with over-the-counter and prescription hemorrhoid cream.  1 episode of painless hematochezia in the past.  Occasional burning after bowel movements.  Denies prolapse.  He is on and off the toilet quickly.  No over-the-counter fiber use.  No blood thinner use.  No previous colonoscopy.  No family history of colorectal cancer.      History:  Past Medical History:   Diagnosis Date    Autism spectrum disorder     Callus     Essential hypertension 04/30/2019    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 IF it persists then EMG       Past Surgical History:   Procedure Laterality Date    APPENDECTOMY      HAND SURGERY      HERNIA REPAIR      KNEE ACL RECONSTRUCTION Left 2000    KNEE MENISCECTOMY  2015    lateral    WISDOM TOOTH EXTRACTION         Family History   Problem Relation Age of Onset    Heart disease Paternal Grandmother     Heart disease Paternal Grandfather     Cancer Paternal Grandfather     Diabetes Other        Social History     Tobacco Use    Smoking status: Never     Passive exposure: Never    Smokeless tobacco: Never   Vaping Use    Vaping status: Never Used   Substance Use Topics    Alcohol use: Never    Drug use: Never       Review of Systems  All systems were reviewed and negative except for:   Review of Systems   Constitutional: Negative for chills, fever and unexpected  weight loss.   HENT: Negative for congestion, nosebleeds and voice change.    Eyes: Negative for blurred vision, double vision and discharge.   Respiratory: Negative for apnea, chest tightness and shortness of breath.    Cardiovascular: Negative for chest pain and leg swelling.   Gastrointestinal:        See HPI   Endocrine: Negative for cold intolerance and heat intolerance.   Genitourinary: Negative for dysuria, hematuria and urgency.   Musculoskeletal: Negative for back pain, joint swelling and neck pain.   Skin: Negative for color change and dry skin.   Neurological: Negative for dizziness and confusion.   Hematological: Negative for adenopathy.   Psychiatric/Behavioral: Negative for agitation and behavioral problems.     MEDS:  Prior to Admission medications    Medication Sig Start Date End Date Taking? Authorizing Provider   Hydrocort-Pramoxine, Perianal, (ANALPRAM-HC) 2.5-1 % rectal cream Insert  into the rectum 3 (Three) Times a Day. 12/9/24  Yes Scarlet Mike APRN   lisdexamfetamine (Vyvanse) 50 MG capsule Take 1 capsule by mouth Every Morning 10/15/24  Yes Ed Myers DO   metoprolol succinate XL (TOPROL-XL) 100 MG 24 hr tablet Take 1 tablet by mouth Daily. 10/15/24  Yes Ed Myers DO   omeprazole (priLOSEC) 20 MG capsule Take 1 capsule by mouth Daily. 10/15/24  Yes Ed Myers DO   primidone (MYSOLINE) 50 MG tablet Titrate as directed and increase every 3 days up to 5 tablets twice a day.  Patient taking differently: Take 1 tablet by mouth Every Night. Titrate as directed and increase every 3 days up to 5 tablets twice a day. 2/12/24  Yes Jeremy Warner MD   sertraline (ZOLOFT) 100 MG tablet TAKE 1 TABLET DAILY 8/21/24  Yes Ed Myers DO   triamcinolone (KENALOG) 0.1 % ointment APPLY TOPICALLY TO THE APPROPRIATE AREA TWICE A DAY AS DIRECTED 12/9/24  Yes Ed Myers DO        Allergies:  Bee venom, Codeine, and  "Latex    Objective     Vital Signs   Resp:  [20] 20    Physical Exam:     General Appearance:    Alert, cooperative, in no acute distress   Head:    Normocephalic, without obvious abnormality, atraumatic   Eyes:          Conjunctivae and sclerae normal, no icterus,     Ears:    Ears appear intact with no abnormalities noted   Throat:   No oral lesions, no thrush, oral mucosa moist   Neck:   No adenopathy, supple, trachea midline, no thyromegaly   Back:     No kyphosis present, no scoliosis present, no skin lesions,      erythema or scars, no tenderness to percussion or                   palpation,   range of motion normal   Lungs:     Clear to auscultation,respirations regular, even and                  unlabored    Heart:    Regular rhythm and normal rate, normal S1 and S2, no            murmur, no gallop, no rub, no click   Chest Wall:    No abnormalities observed   Abdomen:     Normal bowel sounds, no masses, no organomegaly, soft        non-tender, non-distended, no guarding, no rebound                tenderness   Rectal:   Large hemorrhoid skin tag on the right side, noninflamed left external hemorrhoid   Extremities:   Moves all extremities well, no edema, no cyanosis, no             redness   Pulses:   Pulses palpable and equal bilaterally   Skin:   No bleeding, bruising or rash   Lymph nodes:   No palpable adenopathy   Neurologic:   A/o x 4 with no deficits       Results Review:   {Results Review:75168::\"I reviewed the patient's new clinical results.\"    LABS/IMAGING:  Results for orders placed or performed in visit on 11/27/24   CBC (No Diff)    Collection Time: 11/27/24 10:35 AM    Specimen: Blood   Result Value Ref Range    WBC 5.82 3.40 - 10.80 10*3/mm3    RBC 4.92 4.14 - 5.80 10*6/mm3    Hemoglobin 14.8 13.0 - 17.7 g/dL    Hematocrit 44.6 37.5 - 51.0 %    MCV 90.7 79.0 - 97.0 fL    MCH 30.1 26.6 - 33.0 pg    MCHC 33.2 31.5 - 35.7 g/dL    RDW 11.5 (L) 12.3 - 15.4 %    RDW-SD 38.0 37.0 - 54.0 fl    MPV " 9.7 6.0 - 12.0 fL    Platelets 214 140 - 450 10*3/mm3   Basic Metabolic Panel    Collection Time: 11/27/24 10:35 AM    Specimen: Blood   Result Value Ref Range    Glucose 76 65 - 99 mg/dL    BUN 16 6 - 20 mg/dL    Creatinine 1.10 0.76 - 1.27 mg/dL    Sodium 138 136 - 145 mmol/L    Potassium 4.5 3.5 - 5.2 mmol/L    Chloride 104 98 - 107 mmol/L    CO2 28.3 22.0 - 29.0 mmol/L    Calcium 9.3 8.6 - 10.5 mg/dL    BUN/Creatinine Ratio 14.5 7.0 - 25.0    Anion Gap 5.7 5.0 - 15.0 mmol/L    eGFR 86.0 >60.0 mL/min/1.73   TSH Rfx On Abnormal To Free T4    Collection Time: 11/27/24 10:35 AM    Specimen: Blood   Result Value Ref Range    TSH 0.851 0.270 - 4.200 uIU/mL        Result Review : Labs  Result Review  Imaging  Med Tab  Media     Assessment & Plan     Symptomatic hemorrhoids    Long discussion with the patient.  A significant amount of time was spent discussing the patient's concerns and answering his questions.  I recommended excisional hemorrhoidectomy of the patient's large hemorrhoid skin tag on the left side with suture ligation of his remaining hemorrhoids to try to help limit the pain but help the symptoms as well.  Procedure and recovery discussed.  Benefits and alternatives discussed.  Risk procedure including risk of anesthesia, bleeding, infection, recurrence, pain, heart attack, stroke, blood clot, pneumonia.  All questions answered.  He will think about it.  He was instructed to increase fiber in his diet.  I explained him he could send me questions in the portal and I could try to answer them versus have another follow-up appointment.  Thank you for the consult.              This document has been electronically signed by Shiva Rolon MD  December 10, 2024 14:27 EST

## 2024-12-10 NOTE — LETTER
December 10, 2024     SHAAN Rose  [address] [facility] [address]    Patient: Shiva Varela   YOB: 1980   Date of Visit: 12/10/2024     Dear SHAAN Rose:       Thank you for referring Shiva Varela to me for evaluation. Below are the relevant portions of my assessment and plan of care.    If you have questions, please do not hesitate to call me. I look forward to following Shiva along with you.         Sincerely,        Shiva Rolon MD        CC: DO Ольга Hale Matthew Bruce, MD  12/10/24 1501  Sign when Signing Visit  General Surgery/Colorectal Surgery Note    Patient Name:  Shiva Varela  YOB: 1980  [id number]    Referring Provider: Scarlet Mike APRN      Patient Care Team:  Ed Myers DO as PCP - General (Family Medicine)    Chief complaint hemorrhoids    Subjective.     History of present illness:    12-year history of hemorrhoid symptoms.  He has a hemorrhoid skin tag which makes it difficult to clean.  He says this is slowly increased in size.  He also has some swelling to the outside of his anus with minimal improvement with over-the-counter and prescription hemorrhoid cream.  1 episode of painless hematochezia in the past.  Occasional burning after bowel movements.  Denies prolapse.  He is on and off the toilet quickly.  No over-the-counter fiber use.  No blood thinner use.  No previous colonoscopy.  No family history of colorectal cancer.      History:  Past Medical History:   Diagnosis Date   • Autism spectrum disorder    • Callus    • Essential hypertension 04/30/2019   • 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 IF it persists then EMG       Past Surgical History:   Procedure Laterality Date   • APPENDECTOMY     • HAND SURGERY     • HERNIA REPAIR     • KNEE ACL RECONSTRUCTION Left 2000   •  KNEE MENISCECTOMY  2015    lateral   • WISDOM TOOTH EXTRACTION         Family History   Problem Relation Age of Onset   • Heart disease Paternal Grandmother    • Heart disease Paternal Grandfather    • Cancer Paternal Grandfather    • Diabetes Other        Social History     Tobacco Use   • Smoking status: Never     Passive exposure: Never   • Smokeless tobacco: Never   Vaping Use   • Vaping status: Never Used   Substance Use Topics   • Alcohol use: Never   • Drug use: Never       Review of Systems  All systems were reviewed and negative except for:   Review of Systems   Constitutional: Negative for chills, fever and unexpected weight loss.   HENT: Negative for congestion, nosebleeds and voice change.    Eyes: Negative for blurred vision, double vision and discharge.   Respiratory: Negative for apnea, chest tightness and shortness of breath.    Cardiovascular: Negative for chest pain and leg swelling.   Gastrointestinal:        See HPI   Endocrine: Negative for cold intolerance and heat intolerance.   Genitourinary: Negative for dysuria, hematuria and urgency.   Musculoskeletal: Negative for back pain, joint swelling and neck pain.   Skin: Negative for color change and dry skin.   Neurological: Negative for dizziness and confusion.   Hematological: Negative for adenopathy.   Psychiatric/Behavioral: Negative for agitation and behavioral problems.     MEDS:  Prior to Admission medications    Medication Sig Start Date End Date Taking? Authorizing Provider   Hydrocort-Pramoxine, Perianal, (ANALPRAM-HC) 2.5-1 % rectal cream Insert  into the rectum 3 (Three) Times a Day. 12/9/24  Yes Scarlet Mike APRN   lisdexamfetamine (Vyvanse) 50 MG capsule Take 1 capsule by mouth Every Morning 10/15/24  Yes Ed Myers, DO   metoprolol succinate XL (TOPROL-XL) 100 MG 24 hr tablet Take 1 tablet by mouth Daily. 10/15/24  Yes Ed Myers, DO   omeprazole (priLOSEC) 20 MG capsule Take 1 capsule by mouth  Daily. 10/15/24  Yes Ed Myers DO   primidone (MYSOLINE) 50 MG tablet Titrate as directed and increase every 3 days up to 5 tablets twice a day.  Patient taking differently: Take 1 tablet by mouth Every Night. Titrate as directed and increase every 3 days up to 5 tablets twice a day. 2/12/24  Yes Jeremy Warner MD   sertraline (ZOLOFT) 100 MG tablet TAKE 1 TABLET DAILY 8/21/24  Yes Ed Myers DO   triamcinolone (KENALOG) 0.1 % ointment APPLY TOPICALLY TO THE APPROPRIATE AREA TWICE A DAY AS DIRECTED 12/9/24  Yes Ed Myers DO        Allergies:  Bee venom, Codeine, and Latex    Objective    Vital Signs   Resp:  [20] 20    Physical Exam:     General Appearance:    Alert, cooperative, in no acute distress   Head:    Normocephalic, without obvious abnormality, atraumatic   Eyes:          Conjunctivae and sclerae normal, no icterus,     Ears:    Ears appear intact with no abnormalities noted   Throat:   No oral lesions, no thrush, oral mucosa moist   Neck:   No adenopathy, supple, trachea midline, no thyromegaly   Back:     No kyphosis present, no scoliosis present, no skin lesions,      erythema or scars, no tenderness to percussion or                   palpation,   range of motion normal   Lungs:     Clear to auscultation,respirations regular, even and                  unlabored    Heart:    Regular rhythm and normal rate, normal S1 and S2, no            murmur, no gallop, no rub, no click   Chest Wall:    No abnormalities observed   Abdomen:     Normal bowel sounds, no masses, no organomegaly, soft        non-tender, non-distended, no guarding, no rebound                tenderness   Rectal:   Large hemorrhoid skin tag on the right side, noninflamed left external hemorrhoid   Extremities:   Moves all extremities well, no edema, no cyanosis, no             redness   Pulses:   Pulses palpable and equal bilaterally   Skin:   No bleeding, bruising or rash   Lymph nodes:    "No palpable adenopathy   Neurologic:   A/o x 4 with no deficits       Results Review:   {Results Review:93797::\"I reviewed the patient's new clinical results.\"    LABS/IMAGING:  Results for orders placed or performed in visit on 11/27/24   CBC (No Diff)    Collection Time: 11/27/24 10:35 AM    Specimen: Blood   Result Value Ref Range    WBC 5.82 3.40 - 10.80 10*3/mm3    RBC 4.92 4.14 - 5.80 10*6/mm3    Hemoglobin 14.8 13.0 - 17.7 g/dL    Hematocrit 44.6 37.5 - 51.0 %    MCV 90.7 79.0 - 97.0 fL    MCH 30.1 26.6 - 33.0 pg    MCHC 33.2 31.5 - 35.7 g/dL    RDW 11.5 (L) 12.3 - 15.4 %    RDW-SD 38.0 37.0 - 54.0 fl    MPV 9.7 6.0 - 12.0 fL    Platelets 214 140 - 450 10*3/mm3   Basic Metabolic Panel    Collection Time: 11/27/24 10:35 AM    Specimen: Blood   Result Value Ref Range    Glucose 76 65 - 99 mg/dL    BUN 16 6 - 20 mg/dL    Creatinine 1.10 0.76 - 1.27 mg/dL    Sodium 138 136 - 145 mmol/L    Potassium 4.5 3.5 - 5.2 mmol/L    Chloride 104 98 - 107 mmol/L    CO2 28.3 22.0 - 29.0 mmol/L    Calcium 9.3 8.6 - 10.5 mg/dL    BUN/Creatinine Ratio 14.5 7.0 - 25.0    Anion Gap 5.7 5.0 - 15.0 mmol/L    eGFR 86.0 >60.0 mL/min/1.73   TSH Rfx On Abnormal To Free T4    Collection Time: 11/27/24 10:35 AM    Specimen: Blood   Result Value Ref Range    TSH 0.851 0.270 - 4.200 uIU/mL        Result Review: Labs  Result Review  Imaging  Med Tab  Media     Assessment & Plan    Symptomatic hemorrhoids    Long discussion with the patient.  A significant amount of time was spent discussing the patient's concerns and answering his questions.  I recommended excisional hemorrhoidectomy of the patient's large hemorrhoid skin tag on the left side with suture ligation of his remaining hemorrhoids to try to help limit the pain but help the symptoms as well.  Procedure and recovery discussed.  Benefits and alternatives discussed.  Risk procedure including risk of anesthesia, bleeding, infection, recurrence, pain, heart attack, stroke, blood " clot, pneumonia.  All questions answered.  He will think about it.  He was instructed to increase fiber in his diet.  I explained him he could send me questions in the portal and I could try to answer them versus have another follow-up appointment.  Thank you for the consult.              This document has been electronically signed by Shiva Rolon MD  December 10, 2024 14:27 EST

## 2024-12-12 ENCOUNTER — TELEPHONE (OUTPATIENT)
Dept: FAMILY MEDICINE CLINIC | Facility: CLINIC | Age: 44
End: 2024-12-12

## 2024-12-12 NOTE — TELEPHONE ENCOUNTER
Caller: Shiva Varela    Relationship: Self    Best call back number: [phone]     What is the medical concern/diagnosis: HEMORRHOID     What specialty or service is being requested: PROCTOLOGIST    What is the provider, practice or medical service name: N/A    What is the office location: [place] IF POSSIBLE    What is the office phone number: N/A    Any additional details: PATIENT CALLED STATING THAT HE WOULD LIKE TO GET A SECOND OPINION.    PATIENT WOULD LIKE A CALL WHEN THE REFERRAL IS PLACED.

## 2024-12-16 ENCOUNTER — TELEPHONE (OUTPATIENT)
Dept: SURGERY | Facility: CLINIC | Age: 44
End: 2024-12-16
Payer: COMMERCIAL

## 2024-12-16 NOTE — TELEPHONE ENCOUNTER
PT WAS SEEN IN THE OFFICE AND HEMORRHOID SURGERY OPTIONS DISCUSSED. THE PATIENT WOULD LIKE TO KNOW WHAT THE PAIN MANAGEMENT OPTIONS ARE IF HE DOES THE HEMORRHOIDECTOMY. NOT SURE WHAT THE BEST OPTION FOR HIM WOULD BE. ALSO HE NOTICED A HARD SPOT AT THE 6:00 AREA OF THE RECTUM AND DID NOT KNOW IF THAT WAS A THROMBOSED HEMORRHOID OR SOMETHING IRRELEVANT, HE IS NOT SURE IF YOU NOTICED THAT.

## 2024-12-31 ENCOUNTER — TELEPHONE (OUTPATIENT)
Dept: SURGERY | Facility: CLINIC | Age: 44
End: 2024-12-31

## 2024-12-31 NOTE — TELEPHONE ENCOUNTER
CALLED PT TO OFFER R/S OF CX'D APPT 12/31 W/ [facility].    SPOKE W/ PATIENT WHO STATED THAT THINGS HAVE IMPROVED AND DECLINED TO R/S AT THIS TIME.    ANYTHING ELSE TO DO?

## 2025-01-07 RX ORDER — METOPROLOL SUCCINATE 100 MG/1
100 TABLET, EXTENDED RELEASE ORAL DAILY
Qty: 90 TABLET | Refills: 1 | Status: SHIPPED | OUTPATIENT
Start: 2025-01-07

## 2025-01-15 DIAGNOSIS — F90.0 ATTENTION DEFICIT HYPERACTIVITY DISORDER (ADHD), PREDOMINANTLY INATTENTIVE TYPE: ICD-10-CM

## 2025-01-15 DIAGNOSIS — Z00.00 ANNUAL PHYSICAL EXAM: ICD-10-CM

## 2025-01-15 NOTE — TELEPHONE ENCOUNTER
Caller: Shiva Varela    Relationship: Self    Best call back number: [phone]     Requested Prescriptions:   Requested Prescriptions     Pending Prescriptions Disp Refills    lisdexamfetamine (Vyvanse) 50 MG capsule 90 capsule 0     Sig: Take 1 capsule by mouth Every Morning        Pharmacy where request should be sent: [facility] - [place], [place] - [address] [facility] [address] - [phone]  - [phone] FX     Last office visit with prescribing clinician: 11/27/2024   Last telemedicine visit with prescribing clinician: Visit date not found   Next office visit with prescribing clinician: 1/28/2025     Does the patient have less than a 3 day supply:  [] Yes  [] No    Would you like a call back once the refill request has been completed: [] Yes [] No    If the office needs to give you a call back, can they leave a voicemail: [] Yes [] No    Demetria Elizondo Rep   01/15/25 11:11 EST

## 2025-01-16 RX ORDER — LISDEXAMFETAMINE DIMESYLATE 50 MG/1
50 CAPSULE ORAL EVERY MORNING
Qty: 90 CAPSULE | Refills: 0 | Status: SHIPPED | OUTPATIENT
Start: 2025-01-16

## 2025-01-20 ENCOUNTER — TELEPHONE (OUTPATIENT)
Dept: FAMILY MEDICINE CLINIC | Facility: CLINIC | Age: 45
End: 2025-01-20

## 2025-01-20 DIAGNOSIS — Z00.00 ANNUAL PHYSICAL EXAM: ICD-10-CM

## 2025-01-20 DIAGNOSIS — F90.0 ATTENTION DEFICIT HYPERACTIVITY DISORDER (ADHD), PREDOMINANTLY INATTENTIVE TYPE: ICD-10-CM

## 2025-01-20 RX ORDER — LISDEXAMFETAMINE DIMESYLATE 50 MG/1
50 CAPSULE ORAL EVERY MORNING
Qty: 90 CAPSULE | Refills: 0 | Status: CANCELLED | OUTPATIENT
Start: 2025-01-20

## 2025-01-20 NOTE — TELEPHONE ENCOUNTER
Caller: Shiva Varela    Relationship: Self    Best call back number: [phone]     What is the best time to reach you: ANY    Who are you requesting to speak with (clinical staff, provider,  specific staff member): CLINICAL STAFF    What was the call regarding: PATIENT CALLED STATING THAT HIS INSURANCE WILL NO LONGER COVER HIS OMEPRAZOLE AND HE WOULD LIKE TO KNOW WHAT DOSAGE HE SHOULD TAKE FOR AN OVER THE COUNTER MEDICATION.

## 2025-01-20 NOTE — TELEPHONE ENCOUNTER
Caller: Shiva Varela    Relationship: Self    Best call back number: [phone]     Requested Prescriptions:   Requested Prescriptions     Pending Prescriptions Disp Refills    lisdexamfetamine (Vyvanse) 50 MG capsule 90 capsule 0     Sig: Take 1 capsule by mouth Every Morning        Pharmacy where request should be sent: [facility] - [place], [place] - [address] [facility] [address] - [phone]  - [phone] FX     Last office visit with prescribing clinician: 11/27/2024   Last telemedicine visit with prescribing clinician: Visit date not found   Next office visit with prescribing clinician: 1/28/2025     Does the patient have less than a 3 day supply:  [] Yes  [] No    Would you like a call back once the refill request has been completed: [] Yes [] No    If the office needs to give you a call back, can they leave a voicemail: [] Yes [] No    Demetria Elizondo Rep   01/20/25 15:15 EST

## 2025-01-21 RX ORDER — SERTRALINE HYDROCHLORIDE 100 MG/1
100 TABLET, FILM COATED ORAL DAILY
Qty: 90 TABLET | Refills: 1 | Status: SHIPPED | OUTPATIENT
Start: 2025-01-21

## 2025-01-28 ENCOUNTER — OFFICE VISIT (OUTPATIENT)
Dept: FAMILY MEDICINE CLINIC | Facility: CLINIC | Age: 45
End: 2025-01-28
Payer: COMMERCIAL

## 2025-01-28 VITALS
BODY MASS INDEX: 29.16 KG/M2 | SYSTOLIC BLOOD PRESSURE: 118 MMHG | HEART RATE: 71 BPM | OXYGEN SATURATION: 97 % | WEIGHT: 247 LBS | HEIGHT: 77 IN | DIASTOLIC BLOOD PRESSURE: 77 MMHG | TEMPERATURE: 97 F

## 2025-01-28 DIAGNOSIS — N40.1 BENIGN PROSTATIC HYPERPLASIA WITH WEAK URINARY STREAM: ICD-10-CM

## 2025-01-28 DIAGNOSIS — R39.12 BENIGN PROSTATIC HYPERPLASIA WITH WEAK URINARY STREAM: ICD-10-CM

## 2025-01-28 DIAGNOSIS — F90.0 ATTENTION DEFICIT HYPERACTIVITY DISORDER (ADHD), PREDOMINANTLY INATTENTIVE TYPE: Primary | ICD-10-CM

## 2025-01-28 DIAGNOSIS — Z00.00 ANNUAL PHYSICAL EXAM: ICD-10-CM

## 2025-01-28 LAB
BILIRUB BLD-MCNC: NEGATIVE MG/DL
CLARITY, POC: CLEAR
COLOR UR: NORMAL
EXPIRATION DATE: NORMAL
GLUCOSE UR STRIP-MCNC: NEGATIVE MG/DL
KETONES UR QL: NEGATIVE
LEUKOCYTE EST, POC: NEGATIVE
Lab: NORMAL
NITRITE UR-MCNC: NEGATIVE MG/ML
PH UR: 5.5 [PH] (ref 5–8)
PROT UR STRIP-MCNC: NEGATIVE MG/DL
RBC # UR STRIP: NEGATIVE /UL
SP GR UR: 1.03 (ref 1–1.03)
UROBILINOGEN UR QL: NORMAL

## 2025-01-28 RX ORDER — LISDEXAMFETAMINE DIMESYLATE 50 MG/1
50 CAPSULE ORAL EVERY MORNING
Qty: 90 CAPSULE | Refills: 0 | Status: SHIPPED | OUTPATIENT
Start: 2025-01-28 | End: 2025-02-03 | Stop reason: SDUPTHER

## 2025-01-28 NOTE — ASSESSMENT & PLAN NOTE
He did not tolerate the tamsulosin or Hytrin.  Will recheck his urine today and have him see urology.

## 2025-01-28 NOTE — PROGRESS NOTES
"Chief Complaint   Patient presents with    Follow-up     3 mo f/u  ADHD    Medication Problem     Stated he was taken off of the terasozin due to dizziness and that has improved but now having trouble urinating. Stated it feels he has to \"strain\"         Subjective     Shiva Varela  has a past medical history of Autism spectrum disorder, Callus, Essential hypertension (04/30/2019), and Ulnar neuropathy at elbow of left upper extremity (09/10/2020).    Difficulty urinating-he states when he took the terazosin he felt that his urine stream was better and felt that he emptied his bladder better.  Unfortunately with taking it he felt dizzy.  Upon discontinuing it his dizziness resolved but his urinary symptoms returned.    ADD-he states he takes his Vyvanse on a daily basis.  He states his attention focus concentration distractibility are improved.        PHQ-2 Depression Screening  Little interest or pleasure in doing things?     Feeling down, depressed, or hopeless?     PHQ-2 Total Score     PHQ-9 Depression Screening  Little interest or pleasure in doing things?     Feeling down, depressed, or hopeless?     Trouble falling or staying asleep, or sleeping too much?     Feeling tired or having little energy?     Poor appetite or overeating?     Feeling bad about yourself - or that you are a failure or have let yourself or your family down?     Trouble concentrating on things, such as reading the newspaper or watching television?     Moving or speaking so slowly that other people could have noticed? Or the opposite - being so fidgety or restless that you have been moving around a lot more than usual?     Thoughts that you would be better off dead, or of hurting yourself in some way?     PHQ-9 Total Score     If you checked off any problems, how difficult have these problems made it for you to do your work, take care of things at home, or get along with other people?       Allergies   Allergen Reactions    Bee Venom " Unknown - Low Severity    Codeine Unknown - Low Severity    Latex Unknown - Low Severity     Persistent Contact        Prior to Admission medications    Medication Sig Start Date End Date Taking? Authorizing Provider   Hydrocort-Pramoxine, Perianal, (ANALPRAM-HC) 2.5-1 % rectal cream Insert  into the rectum 3 (Three) Times a Day. 12/9/24  Yes Scarlet Mike APRN   lisdexamfetamine (Vyvanse) 50 MG capsule Take 1 capsule by mouth Every Morning 1/16/25  Yes Ed Myers DO   metoprolol succinate XL (TOPROL-XL) 100 MG 24 hr tablet TAKE 1 TABLET BY MOUTH EVERY DAY 1/7/25  Yes Ed Myers DO   omeprazole (priLOSEC) 20 MG capsule TAKE 1 CAPSULE BY MOUTH EVERY DAY 1/7/25  Yes Ed Myers DO   primidone (MYSOLINE) 50 MG tablet Titrate as directed and increase every 3 days up to 5 tablets twice a day.  Patient taking differently: Take 1 tablet by mouth Every Night. Titrate as directed and increase every 3 days up to 5 tablets twice a day. 2/12/24  Yes Jeremy Warner MD   sertraline (ZOLOFT) 100 MG tablet Take 1 tablet by mouth Daily. 1/21/25  Yes Ed Myers DO   triamcinolone (KENALOG) 0.1 % ointment APPLY TOPICALLY TO THE APPROPRIATE AREA TWICE A DAY AS DIRECTED 12/9/24  Yes Ed Myers DO        Patient Active Problem List   Diagnosis    Attention deficit hyperactivity disorder (ADHD)    Essential hypertension    Major depressive disorder, recurrent episode, in full remission    Difficulty urinating    Benign prostatic hyperplasia with weak urinary stream    Spondylolisthesis at L5-S1 level    Abdominal hernia    Eczema    Mixed hyperlipidemia    Ulnar neuropathy at elbow of left upper extremity    Vitamin D deficiency    Insect bite of right ankle    Annual physical exam    Essential tremor    Gastroesophageal reflux disease without esophagitis    Skin pustule    Bilateral hip pain    Sensory disorder    Skin lesion of chest wall    Dizziness  "       Past Surgical History:   Procedure Laterality Date    APPENDECTOMY      HAND SURGERY      HERNIA REPAIR      KNEE ACL RECONSTRUCTION Left 2000    KNEE MENISCECTOMY  2015    lateral    WISDOM TOOTH EXTRACTION         Social History     Socioeconomic History    Marital status: Single   Tobacco Use    Smoking status: Never     Passive exposure: Never    Smokeless tobacco: Never   Vaping Use    Vaping status: Never Used   Substance and Sexual Activity    Alcohol use: Never    Drug use: Never    Sexual activity: Defer       Family History   Problem Relation Age of Onset    Heart disease Paternal Grandmother     Heart disease Paternal Grandfather     Cancer Paternal Grandfather     Diabetes Other        Family history, surgical history, past medical history, Allergies and meds reviewed with patient today and updated in [employer] EMR.     ROS:  Review of Systems   Constitutional:  Negative for fatigue.   Genitourinary:  Positive for difficulty urinating, dysuria, frequency, nocturia and urgency. Negative for hematuria.   Psychiatric/Behavioral:  Negative for decreased concentration.        OBJECTIVE:  Vitals:    01/28/25 0839   BP: 118/77   BP Location: Right arm   Patient Position: Sitting   Cuff Size: Large Adult   Pulse: 71   Temp: 97 °F (36.1 °C)   TempSrc: Temporal   SpO2: 97%   Weight: 112 kg (247 lb)   Height: 195.6 cm (77\")     No results found.   Body mass index is 29.29 kg/m².  No LMP for male patient.    The 10-year ASCVD risk score (Gopi DK, et al., 2019) is: 1.3%    Values used to calculate the score:      Age: 44 years      Sex: Male      Is Non- : No      Diabetic: No      Tobacco smoker: No      Systolic Blood Pressure: 118 mmHg      Is BP treated: Yes      HDL Cholesterol: 39 mg/dL      Total Cholesterol: 159 mg/dL     Physical Exam  Vitals and nursing note reviewed.   Constitutional:       General: He is not in acute distress.     Appearance: Normal appearance. He is normal " weight.   HENT:      Head: Normocephalic.   Cardiovascular:      Rate and Rhythm: Normal rate and regular rhythm.      Heart sounds: Normal heart sounds. No murmur heard.  Pulmonary:      Effort: Pulmonary effort is normal.      Breath sounds: No wheezing or rhonchi.   Neurological:      Mental Status: He is alert.         Procedures    No visits with results within 30 Day(s) from this visit.   Latest known visit with results is:   Office Visit on 11/27/2024   Component Date Value Ref Range Status    WBC 11/27/2024 5.82  3.40 - 10.80 10*3/mm3 Final    RBC 11/27/2024 4.92  4.14 - 5.80 10*6/mm3 Final    Hemoglobin 11/27/2024 14.8  13.0 - 17.7 g/dL Final    Hematocrit 11/27/2024 44.6  37.5 - 51.0 % Final    MCV 11/27/2024 90.7  79.0 - 97.0 fL Final    MCH 11/27/2024 30.1  26.6 - 33.0 pg Final    MCHC 11/27/2024 33.2  31.5 - 35.7 g/dL Final    RDW 11/27/2024 11.5 (L)  12.3 - 15.4 % Final    RDW-SD 11/27/2024 38.0  37.0 - 54.0 fl Final    MPV 11/27/2024 9.7  6.0 - 12.0 fL Final    Platelets 11/27/2024 214  140 - 450 10*3/mm3 Final    Glucose 11/27/2024 76  65 - 99 mg/dL Final    BUN 11/27/2024 16  6 - 20 mg/dL Final    Creatinine 11/27/2024 1.10  0.76 - 1.27 mg/dL Final    Sodium 11/27/2024 138  136 - 145 mmol/L Final    Potassium 11/27/2024 4.5  3.5 - 5.2 mmol/L Final    Chloride 11/27/2024 104  98 - 107 mmol/L Final    CO2 11/27/2024 28.3  22.0 - 29.0 mmol/L Final    Calcium 11/27/2024 9.3  8.6 - 10.5 mg/dL Final    BUN/Creatinine Ratio 11/27/2024 14.5  7.0 - 25.0 Final    Anion Gap 11/27/2024 5.7  5.0 - 15.0 mmol/L Final    eGFR 11/27/2024 86.0  >60.0 mL/min/1.73 Final    TSH 11/27/2024 0.851  0.270 - 4.200 uIU/mL Final       ASSESSMENT/ PLAN:    Diagnoses and all orders for this visit:    1. Attention deficit hyperactivity disorder (ADHD), predominantly inattentive type (Primary)  Assessment & Plan:  He states his symptoms are improved with his current dose of medication.  Will continue that.    Orders:  -      lisdexamfetamine (Vyvanse) 50 MG capsule; Take 1 capsule by mouth Every Morning  Dispense: 90 capsule; Refill: 0    2. Benign prostatic hyperplasia with weak urinary stream  Assessment & Plan:  He did not tolerate the tamsulosin or Hytrin.  Will recheck his urine today and have him see urology.    Orders:  -     POCT urinalysis dipstick, automated  -     Ambulatory Referral to Urology    3. Annual physical exam  -     lisdexamfetamine (Vyvanse) 50 MG capsule; Take 1 capsule by mouth Every Morning  Dispense: 90 capsule; Refill: 0               Orders Placed Today:     New Medications Ordered This Visit   Medications    lisdexamfetamine (Vyvanse) 50 MG capsule     Sig: Take 1 capsule by mouth Every Morning     Dispense:  90 capsule     Refill:  0        Management Plan:     An After Visit Summary was printed and given to the patient at discharge.    Follow-up: Return in about 3 months (around 4/28/2025) for Recheck.    Ed Myers,  1/28/2025 08:57 EST  This note was electronically signed.

## 2025-02-03 ENCOUNTER — TELEPHONE (OUTPATIENT)
Dept: FAMILY MEDICINE CLINIC | Facility: CLINIC | Age: 45
End: 2025-02-03
Payer: COMMERCIAL

## 2025-02-03 DIAGNOSIS — Z00.00 ANNUAL PHYSICAL EXAM: ICD-10-CM

## 2025-02-03 DIAGNOSIS — F90.0 ATTENTION DEFICIT HYPERACTIVITY DISORDER (ADHD), PREDOMINANTLY INATTENTIVE TYPE: ICD-10-CM

## 2025-02-03 RX ORDER — LISDEXAMFETAMINE DIMESYLATE 50 MG/1
50 CAPSULE ORAL EVERY MORNING
Qty: 30 CAPSULE | Refills: 0 | Status: SHIPPED | OUTPATIENT
Start: 2025-02-03 | End: 2025-02-04 | Stop reason: SDUPTHER

## 2025-02-03 RX ORDER — LISDEXAMFETAMINE DIMESYLATE 50 MG/1
50 CAPSULE ORAL EVERY MORNING
Qty: 90 CAPSULE | Refills: 0 | Status: SHIPPED | OUTPATIENT
Start: 2025-02-03 | End: 2025-02-03 | Stop reason: SDUPTHER

## 2025-02-03 NOTE — TELEPHONE ENCOUNTER
Patient called to let us know that he did not receive his Vyvanse refill from his LOV 01/28/25. It looks like it failed sending to [facility]. Could we please try and send this again? Thank you    Vyvanse 50 MG 1qd am

## 2025-02-03 NOTE — TELEPHONE ENCOUNTER
Spoke with pharmacist , she stated they do not fill controlled medications . Pt notified and would like it sent to [facility] .

## 2025-02-04 DIAGNOSIS — F90.0 ATTENTION DEFICIT HYPERACTIVITY DISORDER (ADHD), PREDOMINANTLY INATTENTIVE TYPE: ICD-10-CM

## 2025-02-04 DIAGNOSIS — Z00.00 ANNUAL PHYSICAL EXAM: ICD-10-CM

## 2025-02-04 RX ORDER — LISDEXAMFETAMINE DIMESYLATE 50 MG/1
50 CAPSULE ORAL EVERY MORNING
Qty: 30 CAPSULE | Refills: 0 | Status: SHIPPED | OUTPATIENT
Start: 2025-02-04

## 2025-02-07 ENCOUNTER — TELEPHONE (OUTPATIENT)
Dept: FAMILY MEDICINE CLINIC | Facility: CLINIC | Age: 45
End: 2025-02-07
Payer: COMMERCIAL

## 2025-02-07 NOTE — TELEPHONE ENCOUNTER
Caller: Shiva Varela    Relationship: Self    Best call back number:     [phone]       What is the medical concern/diagnosis:     What specialty or service is being requested: UROLOGIST    What is the provider, practice or medical service name:     What is the office location:     What is the office phone number:     Any additional details: PATIENT WOULD LIKE TO HAVE A MALE UROLOGIST, NOT FEMALE

## 2025-02-10 ENCOUNTER — TELEPHONE (OUTPATIENT)
Dept: NEUROLOGY | Facility: CLINIC | Age: 45
End: 2025-02-10
Payer: COMMERCIAL

## 2025-02-10 NOTE — TELEPHONE ENCOUNTER
Relay     Patient can just call the urology office back and let them know he only wants to see a male provider his appointment may be further out.  Their phone number is [phone].  My chart message sent to patient also

## 2025-02-10 NOTE — TELEPHONE ENCOUNTER
Medication requested (name and dose):      primidone (MYSOLINE) 50 MG tablet   Patient taking differently: 50 mg Oral Nightly, Titrate as directed and increase every 3 days up to 5 tablets twice a day., Reported on 1/28/2025     Pharmacy where request should be sent:      [address] [facility] [phone] [facility] [phone]      Additional details provided by patient:      Best call back number:  [phone]    Does the patient have less than a 3 day supply:  [] Yes  [x] No    Demetria Elizalde Rep  02/10/25, 16:26 EST

## 2025-02-11 RX ORDER — PRIMIDONE 50 MG/1
TABLET ORAL
Qty: 90 TABLET | Refills: 2 | Status: SHIPPED | OUTPATIENT
Start: 2025-02-11

## 2025-02-11 NOTE — TELEPHONE ENCOUNTER
Provider: DR ZAPATA    Caller: Shiva Varela    Relationship to Patient: Self    Pharmacy: [id number] - [place], [place] - [address] AT [facility] [phone] [facility] [phone] FX      Phone Number: [phone]     Reason for Call: PATIENT IS CHECKING ON THE STATUS OF HIS RX REFILL REQUEST FOR THE PRIMIDONE. HE STATED HE WANTS THE RX TO BE SENT TO A DIFFERENT PHARMACY. NOT THE [facility].     PLEASE REVIEW  THANK YOU

## 2025-02-18 ENCOUNTER — TELEPHONE (OUTPATIENT)
Dept: UROLOGY | Age: 45
End: 2025-02-18
Payer: COMMERCIAL

## 2025-02-18 NOTE — TELEPHONE ENCOUNTER
PATIENT CALLED.  HE IS SCHEDULED WITH [facility] FOR NEW PATIENT APPOINTMENT ON 03/07/25 FOR BPH WITH WEAK URINARY STREAM.    HE IS REQUESTING A MALE UROLOGIST.    PLEASE SEE PHONE ENCOUNTER ON 02/07/25 WITH DR. DELORIS SAENZ.    #[phone]

## 2025-02-24 ENCOUNTER — TELEPHONE (OUTPATIENT)
Dept: FAMILY MEDICINE CLINIC | Facility: CLINIC | Age: 45
End: 2025-02-24
Payer: COMMERCIAL

## 2025-02-24 DIAGNOSIS — F90.0 ATTENTION DEFICIT HYPERACTIVITY DISORDER (ADHD), PREDOMINANTLY INATTENTIVE TYPE: ICD-10-CM

## 2025-02-24 DIAGNOSIS — Z00.00 ANNUAL PHYSICAL EXAM: ICD-10-CM

## 2025-02-24 RX ORDER — LISDEXAMFETAMINE DIMESYLATE 40 MG/1
40 CAPSULE ORAL EVERY MORNING
Qty: 30 CAPSULE | Refills: 0 | Status: SHIPPED | OUTPATIENT
Start: 2025-02-24

## 2025-02-24 NOTE — TELEPHONE ENCOUNTER
Numbness and tingling in hands, arms and into back.    Took temp of his hands and they are physically cold to touch.     He is wondering if he should adjust his medications?

## 2025-03-06 ENCOUNTER — OFFICE VISIT (OUTPATIENT)
Dept: FAMILY MEDICINE CLINIC | Facility: CLINIC | Age: 45
End: 2025-03-06
Payer: COMMERCIAL

## 2025-03-06 VITALS
OXYGEN SATURATION: 99 % | TEMPERATURE: 96.3 F | DIASTOLIC BLOOD PRESSURE: 75 MMHG | HEIGHT: 77 IN | SYSTOLIC BLOOD PRESSURE: 119 MMHG | HEART RATE: 63 BPM | BODY MASS INDEX: 30.23 KG/M2 | WEIGHT: 256 LBS

## 2025-03-06 DIAGNOSIS — R20.0 NUMBNESS AND TINGLING IN BOTH HANDS: Primary | ICD-10-CM

## 2025-03-06 DIAGNOSIS — R20.2 NUMBNESS AND TINGLING IN BOTH HANDS: Primary | ICD-10-CM

## 2025-03-06 PROCEDURE — 99213 OFFICE O/P EST LOW 20 MIN: CPT | Performed by: FAMILY MEDICINE

## 2025-03-06 NOTE — ASSESSMENT & PLAN NOTE
This is in his fingertips for the most part.  It is somewhat symmetrical.  Some of his findings are concerning for carpal tunnel syndrome.  Since it somewhat symmetrical could also be due to spinal stenosis.  Will get an EMG to evaluate further.

## 2025-03-06 NOTE — PROGRESS NOTES
Chief Complaint   Patient presents with    fingers tingling         Subjective     Shiva Varela  has a past medical history of Autism spectrum disorder, Callus, Essential hypertension (04/30/2019), and Ulnar neuropathy at elbow of left upper extremity (09/10/2020).    Tingling fingers-he has noticed tingling in his fingertips in both hands for the last couple months.  He states over this period of time it really has not gotten any worse.  It seems to happen more often at static positions.  Typically if just sitting in a chair or while holding his mouse from his computer.  Will last an hour or 2 and then resolves spontaneously.  He has not noticed any obvious weakness.        PHQ-2 Depression Screening  Little interest or pleasure in doing things?     Feeling down, depressed, or hopeless?     PHQ-2 Total Score     PHQ-9 Depression Screening  Little interest or pleasure in doing things?     Feeling down, depressed, or hopeless?     Trouble falling or staying asleep, or sleeping too much?     Feeling tired or having little energy?     Poor appetite or overeating?     Feeling bad about yourself - or that you are a failure or have let yourself or your family down?     Trouble concentrating on things, such as reading the newspaper or watching television?     Moving or speaking so slowly that other people could have noticed? Or the opposite - being so fidgety or restless that you have been moving around a lot more than usual?     Thoughts that you would be better off dead, or of hurting yourself in some way?     PHQ-9 Total Score     If you checked off any problems, how difficult have these problems made it for you to do your work, take care of things at home, or get along with other people?       Allergies   Allergen Reactions    Bee Venom Unknown - Low Severity    Codeine Unknown - Low Severity    Latex Unknown - Low Severity     Persistent Contact        Prior to Admission medications    Medication Sig Start Date End  Date Taking? Authorizing Provider   Hydrocort-Pramoxine, Perianal, (ANALPRAM-HC) 2.5-1 % rectal cream Insert  into the rectum 3 (Three) Times a Day. 12/9/24  Yes Scarlet Mike APRN   lisdexamfetamine (Vyvanse) 40 MG capsule Take 1 capsule by mouth Every Morning 2/24/25  Yes Ed Myers DO   metoprolol succinate XL (TOPROL-XL) 100 MG 24 hr tablet TAKE 1 TABLET BY MOUTH EVERY DAY 1/7/25  Yes Ed Myers DO   omeprazole (priLOSEC) 20 MG capsule TAKE 1 CAPSULE BY MOUTH EVERY DAY 1/7/25  Yes Ed Myers DO   primidone (MYSOLINE) 50 MG tablet Take 1 tablet nightly 2/11/25  Yes Jeremy Warner MD   sertraline (ZOLOFT) 100 MG tablet Take 1 tablet by mouth Daily. 1/21/25  Yes Ed Myers DO   triamcinolone (KENALOG) 0.1 % ointment APPLY TOPICALLY TO THE APPROPRIATE AREA TWICE A DAY AS DIRECTED 12/9/24  Yes Ed Myers DO        Patient Active Problem List   Diagnosis    Attention deficit hyperactivity disorder (ADHD)    Essential hypertension    Major depressive disorder, recurrent episode, in full remission    Difficulty urinating    Benign prostatic hyperplasia with weak urinary stream    Spondylolisthesis at L5-S1 level    Abdominal hernia    Eczema    Mixed hyperlipidemia    Ulnar neuropathy at elbow of left upper extremity    Vitamin D deficiency    Insect bite of right ankle    Annual physical exam    Essential tremor    Gastroesophageal reflux disease without esophagitis    Skin pustule    Bilateral hip pain    Sensory disorder    Skin lesion of chest wall    Dizziness    Numbness and tingling in both hands        Past Surgical History:   Procedure Laterality Date    APPENDECTOMY      HAND SURGERY      HERNIA REPAIR      KNEE ACL RECONSTRUCTION Left 2000    KNEE MENISCECTOMY  2015    lateral    WISDOM TOOTH EXTRACTION         Social History     Socioeconomic History    Marital status: Single   Tobacco Use    Smoking status: Never      "Passive exposure: Never    Smokeless tobacco: Never   Vaping Use    Vaping status: Never Used   Substance and Sexual Activity    Alcohol use: Never    Drug use: Never    Sexual activity: Defer       Family History   Problem Relation Age of Onset    Heart disease Paternal Grandmother     Heart disease Paternal Grandfather     Cancer Paternal Grandfather     Diabetes Other        Family history, surgical history, past medical history, Allergies and meds reviewed with patient today and updated in [email or web address] EMR.     ROS:  Review of Systems   Neurological:  Positive for numbness. Negative for weakness.       OBJECTIVE:  Vitals:    03/06/25 0930   BP: 119/75   BP Location: Left arm   Patient Position: Sitting   Pulse: 63   Temp: 96.3 °F (35.7 °C)   SpO2: 99%   Weight: 116 kg (256 lb)   Height: 195.6 cm (77\")     No results found.   Body mass index is 30.36 kg/m².  No LMP for male patient.    The 10-year ASCVD risk score (Gopi HILLMAN, et al., 2019) is: 1.3%    Values used to calculate the score:      Age: 44 years      Sex: Male      Is Non- : No      Diabetic: No      Tobacco smoker: No      Systolic Blood Pressure: 119 mmHg      Is BP treated: Yes      HDL Cholesterol: 39 mg/dL      Total Cholesterol: 159 mg/dL     Physical Exam  Vitals and nursing note reviewed.   Constitutional:       General: He is not in acute distress.     Appearance: Normal appearance. He is obese.   HENT:      Head: Normocephalic.   Cardiovascular:      Pulses: Normal pulses.   Neurological:      Mental Status: He is alert.      Sensory: Sensation is intact.      Motor: Tremor present.      Comments: (+) tinnel's and Phalens. (-) compression test.  strength equal         Procedures    No visits with results within 30 Day(s) from this visit.   Latest known visit with results is:   Office Visit on 01/28/2025   Component Date Value Ref Range Status    Color 01/28/2025 Dark Yellow  Yellow, Straw, Dark Yellow, Claudia Final    " Clarity, UA 01/28/2025 Clear  Clear Final    Specific Gravity  01/28/2025 1.030  1.005 - 1.030 Final    pH, Urine 01/28/2025 5.5  5.0 - 8.0 Final    Leukocytes 01/28/2025 Negative  Negative Final    Nitrite, UA 01/28/2025 Negative  Negative Final    Protein, POC 01/28/2025 Negative  Negative mg/dL Final    Glucose, UA 01/28/2025 Negative  Negative mg/dL Final    Ketones, UA 01/28/2025 Negative  Negative Final    Urobilinogen, UA 01/28/2025 0.2 E.U./dL  Normal, 0.2 E.U./dL Final    Bilirubin 01/28/2025 Negative  Negative Final    Blood, UA 01/28/2025 Negative  Negative Final    Lot Number 01/28/2025 [id number]   Final    Expiration Date 01/28/2025 4/31/2025   Final       ASSESSMENT/ PLAN:    Diagnoses and all orders for this visit:    1. Numbness and tingling in both hands (Primary)  Assessment & Plan:  This is in his fingertips for the most part.  It is somewhat symmetrical.  Some of his findings are concerning for carpal tunnel syndrome.  Since it somewhat symmetrical could also be due to spinal stenosis.  Will get an EMG to evaluate further.    Orders:  -     EMG & Nerve Conduction Test; Future               Orders Placed Today:     No orders of the defined types were placed in this encounter.       Management Plan:     An After Visit Summary was printed and given to the patient at discharge.    Follow-up: Return for after EMG.    Ed Myers,  3/6/2025 09:53 EST  This note was electronically signed.

## 2025-03-24 ENCOUNTER — TELEPHONE (OUTPATIENT)
Dept: UROLOGY | Age: 45
End: 2025-03-24
Payer: COMMERCIAL

## 2025-03-24 NOTE — TELEPHONE ENCOUNTER
Caller: KATHERINE BIRMINGHAM    Relationship: SELF    Best call back number: [phone] (home)      What is the best time to reach you: ANY    Who are you requesting to speak with (clinical staff, provider,  specific staff member): DR. VARGAS    Do you know the name of the person who called: PATIENT    What was the call regarding: PT CALLED STATING HE HAS BEEN HAVING BAD URINARY PAIN AND TROUBLE URINATING, HE SAID IT GETS REALLY BAD SOME NIGHTS AND IS REALLY PAINFUL. PT HAS AN APPT IN MAY BUT WANTS TO BE SEEN SOONER DUE TO THESE SYMPTOMS. NOTHING AVAILABLE WITH [facility] UNTIL MAY. PLEASE CALL PT TO ADVISE ON SOONER APPT FOR PAINFUL URINARY SYMPTOMS IF POSSIBLE. THANK YOU.

## 2025-03-24 NOTE — TELEPHONE ENCOUNTER
SPOKE W/ PATIENT AND INFORMED HIM DR. CARTER IS THE ONLY MALE UROLOGY PROVIDER WE HAVE AND HE DOES NOT HAVE ANY SOONER APPOINTMENTS.    INFORMED PATIENT THAT SHAAN MOON COULD WORK HIM INTO HER SCHEDULE FOR THIS FRIDAY. PATIENT WAS HESITANT TO BOOK W/ FEMALE PROVIDER, BUT THEN WAS AGREEABLE AFTER REMINDING PT DR. CARTER DID NOT HAVE ANYTHING SOONER.     SCHEDULED FOR 3/28 @ 11:30

## 2025-03-25 NOTE — PROGRESS NOTES
"Chief Complaint: weak strream (Not emptying well)        Subjective         History of Present Illness  Shiva Varela is a 44 y.o. male presents to [facility] to be seen for dysuria and BPH with weak urinary stream.    The patient reports that he has noticed that he has had increasing issues with a weak urinary stream since the age of 20.  He reports that his urinary stream does seem slow and he does occasionally have dribbling of urine.  He reports that he did have an episode about 2 weeks ago where he felt like he was \"stopped up\" and like he could not empty his bladder, but once he was able to relax himself he could empty his bladder better.  He reports that sometimes he has to do certain activities that seem to help him relax so that he can relax his bladder to urinate normally.  He reports that those activities could be something like touching something cold or touching his lips.  He reports that he has also noticed abnormal passage of semen after urination (with him stripping the penis/urethra to make sure that he is completely empty after urinating he will sometimes pass a small amount of semen) and will also sometimes leak semen when having a bowel movement.  He reports that sometimes he will pass semen randomly like when he is making breakfast.  He reports that the abnormal passage of semen has been an issue for a couple of years.  He has also noticed occasional burning with ejaculation a couple of times in the last year or so.  He denies any dysuria or regular penile discharge.  He denies any concern for STI.  He reports that when he ejaculates with sexual intercourse or masturbation that sometimes it seems like his semen comes out more in spurts and not all at the same time.  He reports that his primary care provider has previously tried him on Flomax, but that seemed to make him super dizzy and groggy, and also Hytrin, but that made him quite lightheaded.  He really was not " "able to tolerate either of these medications.  He reports that both of these medicines did help with the issues with his urinary stream somewhat, but it did not seem to affect the abnormal passage of semen.  When questioning the patient regarding his urinary symptoms and history, the patient does report that he does have pain and a \"warm feeling\" to the perineal area.  He reports that a couple of months ago he was having sharper pain in the perineal area, but that has eased up.  He does report that he occasionally has issues with urinary frequency, but this is not every time for every day.  He also reports that he usually gets up about 2 or 3 times per night on average to urinate.  He has never been diagnosed or treated for a prostatitis.        Urinary symptoms/history:  Frequency-sometimes    Urgency-denies     Incontinence-denies     Nocturia-admits, 2-3 X on average     Dysuria-denies     Perineal pain-admits, describes this as a \"warm feeling\" to the perineal area, had sharper pain to the perineal area a couple of months ago    Stream-weak, slow, sometimes dribbles    GH-denies     History of stones-denies      surgeries-denies     Family history of  malignancy-denies     Cardiopulmonary-HTN, ADAD, autism     Anticoagulants-none     Smoker-denies     PSA  12/4/2023 0.534    Objective     Past Medical History:   Diagnosis Date    Autism spectrum disorder     Callus     Essential hypertension 04/30/2019    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 IF it persists then EMG       Past Surgical History:   Procedure Laterality Date    APPENDECTOMY      HAND SURGERY      HERNIA REPAIR      KNEE ACL RECONSTRUCTION Left 2000    KNEE MENISCECTOMY  2015    lateral    WISDOM TOOTH EXTRACTION           Current Outpatient Medications:     lisdexamfetamine (Vyvanse) 40 MG capsule, Take 1 capsule by mouth Every Morning, Disp: 30 capsule, " "Rfl: 0    metoprolol succinate XL (TOPROL-XL) 100 MG 24 hr tablet, TAKE 1 TABLET BY MOUTH EVERY DAY, Disp: 90 tablet, Rfl: 1    omeprazole (priLOSEC) 20 MG capsule, TAKE 1 CAPSULE BY MOUTH EVERY DAY, Disp: 90 capsule, Rfl: 1    primidone (MYSOLINE) 50 MG tablet, Take 1 tablet nightly, Disp: 90 tablet, Rfl: 2    sertraline (ZOLOFT) 100 MG tablet, Take 1 tablet by mouth Daily., Disp: 90 tablet, Rfl: 1    triamcinolone (KENALOG) 0.1 % ointment, APPLY TOPICALLY TO THE APPROPRIATE AREA TWICE A DAY AS DIRECTED, Disp: 15 g, Rfl: 0    doxycycline (VIBRAMYCIN) 100 MG capsule, Take 1 capsule by mouth 2 (Two) Times a Day for 28 days., Disp: 56 capsule, Rfl: 0    Hydrocort-Pramoxine, Perianal, (ANALPRAM-HC) 2.5-1 % rectal cream, Insert  into the rectum 3 (Three) Times a Day., Disp: 30 g, Rfl: 1    Lactobacillus (Florajen Acidophilus) capsule, Take 1 capsule by mouth Daily., Disp: 28 capsule, Rfl: 0    Allergies   Allergen Reactions    Bee Venom Unknown - Low Severity    Codeine Unknown - Low Severity    Latex Unknown - Low Severity     Persistent Contact         Family History   Problem Relation Age of Onset    Heart disease Paternal Grandmother     Heart disease Paternal Grandfather     Cancer Paternal Grandfather     Diabetes Other        Social History     Socioeconomic History    Marital status: Single   Tobacco Use    Smoking status: Never     Passive exposure: Never    Smokeless tobacco: Never   Vaping Use    Vaping status: Never Used   Substance and Sexual Activity    Alcohol use: Never    Drug use: Never    Sexual activity: Defer       Vital Signs:   Resp 18   Ht 195.6 cm (77\")   Wt 116 kg (256 lb)   BMI 30.36 kg/m²      Physical Exam  Vitals and nursing note reviewed.   Constitutional:       General: He is not in acute distress.     Appearance: Normal appearance. He is not toxic-appearing.   Pulmonary:      Effort: Pulmonary effort is normal. No respiratory distress.   Neurological:      General: No focal deficit " present.      Mental Status: He is alert and oriented to person, place, and time.          Result Review :   The following data was reviewed by: SHAAN Valentin on 03/28/2025:  Results for orders placed or performed in visit on 03/28/25   Bladder Scan    Collection Time: 03/28/25 11:29 AM   Result Value Ref Range    Volume 0 ML       Bladder Scan interpretation 03/28/2025    Estimation of residual urine via BVI 3000 [address] Bladder Scan  MA/nurse performing: CONNIE Martell  Residual Urine: 0 ml  Indication: Prostatitis, unspecified prostatitis type    Weak urinary stream    Abnormal ejaculation    Painful ejaculation    Urinary frequency    Nocturia   Position: Supine  Examination: Incremental scanning of the suprapubic area using 2.0 MHz transducer using copious amounts of acoustic gel.   Findings: An anechoic area was demonstrated which represented the bladder, with measurement of residual urine as noted. I inspected this myself. In that the residual urine was stable or insignificant, refer to plan for treatment and plan necessary at this time.                 Procedures        Assessment and Plan    Diagnoses and all orders for this visit:    1. Prostatitis, unspecified prostatitis type (Primary)  -     Bladder Scan  -     doxycycline (VIBRAMYCIN) 100 MG capsule; Take 1 capsule by mouth 2 (Two) Times a Day for 28 days.  Dispense: 56 capsule; Refill: 0  -     Lactobacillus (Florajen Acidophilus) capsule; Take 1 capsule by mouth Daily.  Dispense: 28 capsule; Refill: 0    2. Weak urinary stream    3. Abnormal ejaculation    4. Painful ejaculation    5. Urinary frequency    6. Nocturia    Given the patient's symptoms, I think it is reasonable to consider treating him for a suspected prostatitis.  We will treat him with a 4-week course of doxycycline to see if that is helpful with his symptoms.  We will also start him on a probiotic since he will be on a long course of antibiotics.  We did discuss the possibility of  trying the patient on another medication for BPH, such as alfuzosin, but he has not been able to tolerate alpha blockers well in the past so we will hold off on this for now.  We are hopeful that treatment with antibiotics can help with his urinary symptoms, but did discuss that if he is still having a lot of issues when he is seen to follow-up that we may need to consider cystoscopy.  Encouraged behavioral modifications including fluid management, avoiding bladder irritants, limiting fluids before bedtime, elevating legs before bedtime, voiding right before bed, etc. to see if that can help with his urinary frequency and nocturia.    I will plan to see the patient back in the office in 3 months or sooner if needed for any worsening symptoms or new concerns.        Follow Up   Return in about 3 months (around 6/28/2025).  Patient was given instructions and counseling regarding his condition or for health maintenance advice. Please see specific information pulled into the AVS if appropriate.         This document has been electronically signed by SHAAN Valentin  March 28, 2025 13:06 EDT

## 2025-03-28 ENCOUNTER — OFFICE VISIT (OUTPATIENT)
Dept: UROLOGY | Age: 45
End: 2025-03-28
Payer: COMMERCIAL

## 2025-03-28 VITALS — BODY MASS INDEX: 30.23 KG/M2 | WEIGHT: 256 LBS | HEIGHT: 77 IN | RESPIRATION RATE: 18 BRPM

## 2025-03-28 DIAGNOSIS — N53.19 ABNORMAL EJACULATION: ICD-10-CM

## 2025-03-28 DIAGNOSIS — R35.1 NOCTURIA: ICD-10-CM

## 2025-03-28 DIAGNOSIS — N53.12 PAINFUL EJACULATION: ICD-10-CM

## 2025-03-28 DIAGNOSIS — N41.9 PROSTATITIS, UNSPECIFIED PROSTATITIS TYPE: Primary | ICD-10-CM

## 2025-03-28 DIAGNOSIS — R39.12 WEAK URINARY STREAM: ICD-10-CM

## 2025-03-28 DIAGNOSIS — R35.0 URINARY FREQUENCY: ICD-10-CM

## 2025-03-28 LAB — SPECIMEN VOL 24H UR: NORMAL L

## 2025-03-28 PROCEDURE — 99213 OFFICE O/P EST LOW 20 MIN: CPT | Performed by: NURSE PRACTITIONER

## 2025-03-28 RX ORDER — LACTOBACILLUS ACIDOPHILUS 20B CELL
1 CAPSULE ORAL DAILY
Qty: 28 CAPSULE | Refills: 0 | Status: SHIPPED | OUTPATIENT
Start: 2025-03-28

## 2025-03-28 RX ORDER — DOXYCYCLINE 100 MG/1
100 CAPSULE ORAL 2 TIMES DAILY
Qty: 56 CAPSULE | Refills: 0 | Status: SHIPPED | OUTPATIENT
Start: 2025-03-28 | End: 2025-04-25

## 2025-03-31 ENCOUNTER — OFFICE VISIT (OUTPATIENT)
Dept: FAMILY MEDICINE CLINIC | Facility: CLINIC | Age: 45
End: 2025-03-31
Payer: COMMERCIAL

## 2025-03-31 VITALS
WEIGHT: 253 LBS | HEART RATE: 67 BPM | OXYGEN SATURATION: 98 % | DIASTOLIC BLOOD PRESSURE: 75 MMHG | SYSTOLIC BLOOD PRESSURE: 117 MMHG | HEIGHT: 77 IN | TEMPERATURE: 97 F | BODY MASS INDEX: 29.87 KG/M2

## 2025-03-31 DIAGNOSIS — Z00.00 ANNUAL PHYSICAL EXAM: ICD-10-CM

## 2025-03-31 DIAGNOSIS — F90.0 ATTENTION DEFICIT HYPERACTIVITY DISORDER (ADHD), PREDOMINANTLY INATTENTIVE TYPE: ICD-10-CM

## 2025-03-31 DIAGNOSIS — H69.91 DYSFUNCTION OF RIGHT EUSTACHIAN TUBE: Primary | ICD-10-CM

## 2025-03-31 RX ORDER — LISDEXAMFETAMINE DIMESYLATE 40 MG/1
40 CAPSULE ORAL EVERY MORNING
Qty: 30 CAPSULE | Refills: 0 | Status: SHIPPED | OUTPATIENT
Start: 2025-03-31

## 2025-03-31 RX ORDER — FLUTICASONE PROPIONATE 50 MCG
2 SPRAY, SUSPENSION (ML) NASAL DAILY
Qty: 16 G | Refills: 2 | Status: SHIPPED | OUTPATIENT
Start: 2025-03-31

## 2025-03-31 NOTE — ASSESSMENT & PLAN NOTE
His ear otherwise looks fine other than his tympanic membrane bulging and dull.  Will add on some Flonase on a daily basis.

## 2025-03-31 NOTE — PROGRESS NOTES
Chief Complaint   Patient presents with    Earache     Right ear         Subjective     Shiva Varela  has a past medical history of Autism spectrum disorder, Callus, Essential hypertension (04/30/2019), and Ulnar neuropathy at elbow of left upper extremity (09/10/2020).    Earache   There is pain in the right ear. This is a new problem. The current episode started yesterday. The problem occurs constantly. The problem has been unchanged. There has been no fever. Pertinent negatives include no coughing, drainage, headaches, rhinorrhea or sore throat. He has tried antibiotics for the symptoms. The treatment provided no relief.       PHQ-2 Depression Screening  Little interest or pleasure in doing things?     Feeling down, depressed, or hopeless?     PHQ-2 Total Score     PHQ-9 Depression Screening  Little interest or pleasure in doing things?     Feeling down, depressed, or hopeless?     Trouble falling or staying asleep, or sleeping too much?     Feeling tired or having little energy?     Poor appetite or overeating?     Feeling bad about yourself - or that you are a failure or have let yourself or your family down?     Trouble concentrating on things, such as reading the newspaper or watching television?     Moving or speaking so slowly that other people could have noticed? Or the opposite - being so fidgety or restless that you have been moving around a lot more than usual?     Thoughts that you would be better off dead, or of hurting yourself in some way?     PHQ-9 Total Score     If you checked off any problems, how difficult have these problems made it for you to do your work, take care of things at home, or get along with other people?       Allergies   Allergen Reactions    Bee Venom Unknown - Low Severity    Codeine Unknown - Low Severity    Latex Unknown - Low Severity     Persistent Contact        Prior to Admission medications    Medication Sig Start Date End Date Taking? Authorizing Provider    doxycycline (VIBRAMYCIN) 100 MG capsule Take 1 capsule by mouth 2 (Two) Times a Day for 28 days. 3/28/25 4/25/25 Yes Mery Ramirez APRN   Lactobacillus (Florajen Acidophilus) capsule Take 1 capsule by mouth Daily. 3/28/25  Yes Mery Ramirez APRN   lisdexamfetamine (Vyvanse) 40 MG capsule Take 1 capsule by mouth Every Morning 2/24/25  Yes Ed Myers DO   metoprolol succinate XL (TOPROL-XL) 100 MG 24 hr tablet TAKE 1 TABLET BY MOUTH EVERY DAY 1/7/25  Yes Ed Myers DO   omeprazole (priLOSEC) 20 MG capsule TAKE 1 CAPSULE BY MOUTH EVERY DAY 1/7/25  Yes Ed Myers DO   primidone (MYSOLINE) 50 MG tablet Take 1 tablet nightly 2/11/25  Yes Jeremy Warner MD   sertraline (ZOLOFT) 100 MG tablet Take 1 tablet by mouth Daily. 1/21/25  Yes Ed Myers DO   triamcinolone (KENALOG) 0.1 % ointment APPLY TOPICALLY TO THE APPROPRIATE AREA TWICE A DAY AS DIRECTED 12/9/24  Yes Ed Myers DO        Patient Active Problem List   Diagnosis    Attention deficit hyperactivity disorder (ADHD)    Essential hypertension    Major depressive disorder, recurrent episode, in full remission    Difficulty urinating    Benign prostatic hyperplasia with weak urinary stream    Spondylolisthesis at L5-S1 level    Abdominal hernia    Eczema    Mixed hyperlipidemia    Ulnar neuropathy at elbow of left upper extremity    Vitamin D deficiency    Insect bite of right ankle    Annual physical exam    Essential tremor    Gastroesophageal reflux disease without esophagitis    Skin pustule    Bilateral hip pain    Sensory disorder    Skin lesion of chest wall    Dizziness    Numbness and tingling in both hands    Dysfunction of right eustachian tube        Past Surgical History:   Procedure Laterality Date    APPENDECTOMY      HAND SURGERY      HERNIA REPAIR      KNEE ACL RECONSTRUCTION Left 2000    KNEE MENISCECTOMY  2015    lateral    WISDOM TOOTH EXTRACTION    "      Social History     Socioeconomic History    Marital status: Single   Tobacco Use    Smoking status: Never     Passive exposure: Never    Smokeless tobacco: Never   Vaping Use    Vaping status: Never Used   Substance and Sexual Activity    Alcohol use: Never    Drug use: Never    Sexual activity: Defer       Family History   Problem Relation Age of Onset    Heart disease Paternal Grandmother     Heart disease Paternal Grandfather     Cancer Paternal Grandfather     Diabetes Other        Family history, surgical history, past medical history, Allergies and meds reviewed with patient today and updated in [facility] EMR.     ROS:  Review of Systems   Constitutional:  Negative for chills and fever.   HENT:  Positive for ear pain. Negative for congestion, rhinorrhea and sore throat.    Respiratory:  Negative for cough.        OBJECTIVE:  Vitals:    03/31/25 1200   BP: 117/75   BP Location: Left arm   Patient Position: Sitting   Pulse: 67   Temp: 97 °F (36.1 °C)   SpO2: 98%   Weight: 115 kg (253 lb)   Height: 195.6 cm (77\")     No results found.   Body mass index is 30 kg/m².  No LMP for male patient.    The 10-year ASCVD risk score (Homer DK, et al., 2019) is: 1.3%    Values used to calculate the score:      Age: 44 years      Sex: Male      Is Non- : No      Diabetic: No      Tobacco smoker: No      Systolic Blood Pressure: 117 mmHg      Is BP treated: Yes      HDL Cholesterol: 39 mg/dL      Total Cholesterol: 159 mg/dL     Physical Exam  Vitals and nursing note reviewed.   Constitutional:       General: He is not in acute distress.     Appearance: Normal appearance. He is obese.   HENT:      Head: Normocephalic.      Right Ear: Tympanic membrane, ear canal and external ear normal. A middle ear effusion is present. Tympanic membrane is bulging.      Left Ear: Tympanic membrane, ear canal and external ear normal.      Nose: Nose normal.      Mouth/Throat:      Mouth: Mucous membranes are moist.    "   Pharynx: Oropharynx is clear.   Eyes:      General: No scleral icterus.     Conjunctiva/sclera: Conjunctivae normal.      Pupils: Pupils are equal, round, and reactive to light.   Cardiovascular:      Rate and Rhythm: Normal rate and regular rhythm.      Pulses: Normal pulses.      Heart sounds: Normal heart sounds. No murmur heard.  Pulmonary:      Effort: Pulmonary effort is normal.      Breath sounds: Normal breath sounds. No wheezing, rhonchi or rales.   Musculoskeletal:      Cervical back: Neck supple. No rigidity or tenderness.   Lymphadenopathy:      Cervical: No cervical adenopathy.   Skin:     General: Skin is warm and dry.      Coloration: Skin is not jaundiced.      Findings: No rash.   Neurological:      General: No focal deficit present.      Mental Status: He is alert and oriented to person, place, and time.   Psychiatric:         Mood and Affect: Mood normal.         Thought Content: Thought content normal.         Judgment: Judgment normal.         Procedures    Office Visit on 03/28/2025   Component Date Value Ref Range Status    Volume 03/28/2025 0 ML   Final       ASSESSMENT/ PLAN:    Diagnoses and all orders for this visit:    1. Dysfunction of right eustachian tube (Primary)  Assessment & Plan:  His ear otherwise looks fine other than his tympanic membrane bulging and dull.  Will add on some Flonase on a daily basis.      2. Attention deficit hyperactivity disorder (ADHD), predominantly inattentive type  -     lisdexamfetamine (Vyvanse) 40 MG capsule; Take 1 capsule by mouth Every Morning  Dispense: 30 capsule; Refill: 0    3. Annual physical exam  -     lisdexamfetamine (Vyvanse) 40 MG capsule; Take 1 capsule by mouth Every Morning  Dispense: 30 capsule; Refill: 0    Other orders  -     fluticasone (FLONASE) 50 MCG/ACT nasal spray; Administer 2 sprays into the nostril(s) as directed by provider Daily.  Dispense: 16 g; Refill: 2               Orders Placed Today:     New Medications Ordered  This Visit   Medications    fluticasone (FLONASE) 50 MCG/ACT nasal spray     Sig: Administer 2 sprays into the nostril(s) as directed by provider Daily.     Dispense:  16 g     Refill:  2    lisdexamfetamine (Vyvanse) 40 MG capsule     Sig: Take 1 capsule by mouth Every Morning     Dispense:  30 capsule     Refill:  0        Management Plan:     An After Visit Summary was printed and given to the patient at discharge.    Follow-up: Return if symptoms worsen or fail to improve.    Ed Myers DO 3/31/2025 12:42 EDT  This note was electronically signed.

## 2025-04-10 ENCOUNTER — TREATMENT (OUTPATIENT)
Dept: PHYSICAL THERAPY | Facility: CLINIC | Age: 45
End: 2025-04-10
Payer: COMMERCIAL

## 2025-04-10 DIAGNOSIS — M53.3 SACRAL PAIN: Primary | ICD-10-CM

## 2025-04-10 DIAGNOSIS — R29.898 WEAKNESS OF LEFT HIP: ICD-10-CM

## 2025-04-10 DIAGNOSIS — M62.81 MUSCLE WEAKNESS OF PROXIMAL EXTREMITY: ICD-10-CM

## 2025-04-10 NOTE — PROGRESS NOTES
Physical Therapy Initial Evaluation and Plan of Care      [facility]: [address] [place] [address]      Patient: Shiva Varela   : 1980  Diagnosis/ICD-10 Code:  Sacral pain [M53.3]  Referring practitioner: Ed Myers,*  Date of Initial Visit: 4/10/2025  Encounter Date:  4/10/2025  Patient seen for 1 sessions           Subjective Questionnaire: LEFS: 58/80      Subjective   Shiva Varela reports to physical therapy w/ complaints of chronic B hip pain.  Sep of 2023 they experienced a fall where they fractured their tailbone. Pt does have a swelling that does continue to persist in that area. Pt has had some imaging performed for their B hips and this area. Pt reports they do use an elliptical, but has since had to dial back the resistance and frequency due to some increased weakness. Pt reports if they do take a break, they can return to performing these things. Pt does note their L side is more painful and does swell more than their R.        Pt occupation: computer work    Current Pain: 1/10  Best Pain: 0/10  Worst Pain: 5/10  Quality of pain: swelling pressure, throbbing    Aggravating Factors: sit to stands, bending over, longer elliptical work  Easing Factors: tailbone cushion, ice, ibuprofen at times     Past Medical Hx:   Past Medical History:   Diagnosis Date    Autism spectrum disorder     Callus     Essential hypertension 2019    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 IF it persists then EMG       Past Surgical History:   Procedure Laterality Date    APPENDECTOMY      HAND SURGERY      HERNIA REPAIR      KNEE ACL RECONSTRUCTION Left 2000    KNEE MENISCECTOMY  2015    lateral    WISDOM TOOTH EXTRACTION           Objective          Tenderness     Left Hip   No tenderness in the PSIS and sacroiliac joint.     Right Hip   No tenderness in the PSIS and sacroiliac joint.      Additional Tenderness Details  Pt indicates pain at coccyx.     Active Range of Motion   Left Hip   External rotation (90/90): 40 degrees   Internal rotation (90/90): 33 degrees     Right Hip   External rotation (90/90): 50 degrees   Internal rotation (90/90): 35 degrees     Strength/Myotome Testing     Left Hip   Planes of Motion   Flexion: 4+  Abduction: 4  External rotation: 5  Internal rotation: 4+    Right Hip   Planes of Motion   Flexion: 5  Abduction: 4  External rotation: 5  Internal rotation: 5    Left Knee   Extension: 5    Right Knee   Extension: 5    Functional Assessment     Single Leg Stance - Eyes Open   Left  Trial 1: 1 seconds    Right  Trial 1: 8 seconds    Comments  W/ L SLS, pt has a tendency to drop their R hip to offload pressure, decreasing use of L glute med.       See Exercise, Manual, and Modality Logs for complete treatment.       Assessment & Plan       Assessment  Impairments: abnormal coordination, abnormal gait, abnormal or restricted ROM, activity intolerance, impaired balance, impaired physical strength and pain with function   Functional limitations: carrying objects, lifting, sleeping, walking, pulling, pushing, uncomfortable because of pain, moving in bed, standing, stooping and unable to perform repetitive tasks   Assessment details: Pt reports to therapy w/ complaints w/ B hip pain and sacral pain. Pt presents w/ decreased strength, decreased hip stability, and decreased balance. Pt has increased perceived deficits described by LEFS. Pt has been educated on their HEP. Pt has currently been limited in performing ambulatory tasks, sit to stand tasks, and prolonged standing due recurring sacral pain. Pt will benefit from skilled physical therapy, to address their current impairments and limitations, in order to improve upon their ability to perform ADLs and ambulatory tasks safely.        Prognosis: good    Goals  Plan Goals: HIP PROBLEMS    1. The patient complains of L>R hip  pain.   LTG 1: 12 weeks:  The patient will report a pain rating of 0/10 or better in order to improve  tolerance to activities of daily living and improve sleep quality.    STATUS:  New   STG 1a: 6 weeks:  The patient will report a pain rating of 1/10 or better.    STATUS:  New   TREATMENT:  Therapeutic exercises, manual therapy, aquatic therapy, home exercise   instruction, and modalities as needed for pain to include:  electrical stimulation, moist heat, ice,   ultrasound, and diathermy.    2. The patient demonstrates weakness of the L hip.   LTG 2: 12 weeks:  The patient will demonstrate 5/5 strength for L hip flexion, abduction,  and extension in order to improve hip stability.    STATUS:  New   STG 2a: 6 weeks:  The patient will demonstrate 4+/5 strength for L hip flexion, abduction,  and extension.    STATUS:  New   TREATMENT: Therapeutic exercises, manual therapy, aquatic therapy, home exercise instruction,  and modalities as needed for pain to include:  electrical stimulation, moist heat, ice, and ultrasound     3. The patient has poor balance and gait impairment.   LTG 3: 12 weeks: The patient will demonstrate the ability to sustain L single leg stance with hips level for 10 seconds for improved gait mechanics and balance.     STATUS:  New   STG 3a: 6 weeks: The patient will demonstrate the ability to sustain L single leg stance with hips level for 5 seconds    STATUS:  New    TREATMENT: Manual therapy, therapeutic exercise, home exercise instruction, aquatic therapy, and modalities as needed to include:  moist heat, electrical stimulation, ultrasound, and ice.    4. Mobility: Walking/Moving Around Functional Limitation     LTG 4: 12 weeks:  The patient will demonstrate 12.5% limitation by achieving a score of 70/80 on the LEFS.    STATUS:  New   STG 4 a: 6 weeks:  The patient will demonstrate 25% limitation by achieving a score of 60/80 on the LEFS.      STATUS:  New   TREATMENT:  Manual therapy,  therapeutic exercise, home exercise instruction, and modalities as needed to include: moist heat, electrical stimulation, and ultrasound.              PLAN:  Therapy options: will receive skilled therapy services  Planned modality interventions: Cryotherapy and Heat  Planned therapy interventions:balance/weight-bearing training, ADL retraining, soft tissue mobilization, strengthening, stretching, therapeutic activities, manual therapy, joint mobilization, home exercise program/patient education, gait training, functional ROM exercises, flexibility, body mechanics training, postural training, and neuromuscular re-education  Frequency: 2x per week  Duration in weeks: 12  Treatment plan discussed with: patient      Visit Diagnoses:    ICD-10-CM ICD-9-CM   1. Sacral pain  M53.3 724.6   2. Weakness of left hip  R29.898 729.89   3. Muscle weakness of proximal extremity  M62.81 728.87       History # of Personal Factors and/or Comorbidities: MODERATE (1-2)  Examination of Body System(s): # of elements: LOW (1-2)  Clinical Presentation: STABLE   Clinical Decision Making: LOW       Timed:         Manual Therapy:    0     mins  09305;     Therapeutic Exercise:    25     mins  01187;     Neuromuscular Karan:    0    mins  33139;    Therapeutic Activity:     0     mins  57210;     Gait Trainin     mins  73100;     Ultrasound:     0     mins  22864;    Ionto                               0    mins   39238  Self Care                       0     mins   58742  Canalith Repos    0     mins 42144      Un-Timed:  Electrical Stimulation:    0     mins  70758 ( );  Dry Needling     0     mins self-pay  Traction     0     mins 35641  Low Eval     20     Mins  46999  Mod Eval     0     Mins  85794  High Eval                       0     Mins  84928  Re-Eval                           0    mins  70384    Timed Treatment:   25   mins   Total Treatment:     45   mins    PT SIGNATURE: Noman Benz PT, DPT    Electronically signed   4/10/2025    [place] License: PT - [id number]    Initial Certification  Certification Period: 4/10/2025 thru 7/8/2025  I certify that the therapy services are furnished while this patient is under my care.  The services outlined above are required by this patient, and will be reviewed every 90 days.     PHYSICIAN: Ed Myers DO  NPI:   [id number]    DATE:     Please sign and return via fax to [phone]. Thank you, [facility].

## 2025-04-21 ENCOUNTER — TREATMENT (OUTPATIENT)
Dept: PHYSICAL THERAPY | Facility: CLINIC | Age: 45
End: 2025-04-21
Payer: COMMERCIAL

## 2025-04-21 DIAGNOSIS — M62.81 MUSCLE WEAKNESS OF PROXIMAL EXTREMITY: ICD-10-CM

## 2025-04-21 DIAGNOSIS — R29.898 WEAKNESS OF LEFT HIP: ICD-10-CM

## 2025-04-21 DIAGNOSIS — M53.3 SACRAL PAIN: Primary | ICD-10-CM

## 2025-04-21 PROCEDURE — 97530 THERAPEUTIC ACTIVITIES: CPT

## 2025-04-21 PROCEDURE — 97110 THERAPEUTIC EXERCISES: CPT

## 2025-04-21 NOTE — PROGRESS NOTES
Physical Therapy Daily Treatment Note      Patient: Shiva Varela   : 1980  Referring practitioner: Self Referring  Date of Initial Visit: Type: THERAPY  Noted: 4/10/2025  Today's Date: 2025  Patient seen for 2 sessions           Subjective Questionnaire:       Subjective Evaluation    History of Present Illness    Subjective comment: Pt reports 3/10 tail bone soreness.       Objective   See Exercise, Manual, and Modality Logs for complete treatment.       Assessment & Plan       Assessment  Assessment details: Pt reports his pain with side lying abduction with some extension.  Pt tolerated strengthening and stretching well.  Continue with POC.        Visit Diagnoses:    ICD-10-CM ICD-9-CM   1. Sacral pain  M53.3 724.6   2. Weakness of left hip  R29.898 729.89   3. Muscle weakness of proximal extremity  M62.81 728.87       Progress per Plan of Care and Progress strengthening /stabilization /functional activity           Timed:  Manual Therapy:         mins  81066;  Therapeutic Exercise:    19     mins  19391;     Neuromuscular Karan:        mins  32699;    Therapeutic Activity:      8    mins  80821;     Gait Training:           mins  44411;     Ultrasound:          mins  19586;    Electrical Stimulation:         mins  32677 ( );  Aquatic Therapy          mins  00727    Untimed:  Electrical Stimulation:         mins  40387 ( );  Mechanical Traction:         mins  07902;     Timed Treatment:   27   mins   Total Treatment:     27   mins    Electronically signed    Margarita Torres PTA  Physical Therapist Assistant    [employer] license: [id number]

## 2025-04-23 ENCOUNTER — TREATMENT (OUTPATIENT)
Dept: PHYSICAL THERAPY | Facility: CLINIC | Age: 45
End: 2025-04-23
Payer: COMMERCIAL

## 2025-04-23 DIAGNOSIS — M62.81 MUSCLE WEAKNESS OF PROXIMAL EXTREMITY: ICD-10-CM

## 2025-04-23 DIAGNOSIS — M53.3 SACRAL PAIN: Primary | ICD-10-CM

## 2025-04-23 DIAGNOSIS — R29.898 WEAKNESS OF LEFT HIP: ICD-10-CM

## 2025-04-23 NOTE — PROGRESS NOTES
Physical Therapy Daily Treatment Note      Patient: Shiva Varela   : 1980  Referring practitioner: Self Referring  Date of Initial Visit: Type: THERAPY  Noted: 4/10/2025  Today's Date: 2025  Patient seen for 3 sessions           Subjective Questionnaire:       Subjective Evaluation    History of Present Illness    Subjective comment: Pt reported having no pain today.  Pt reports he hasn't had any pain since last session.       Objective   See Exercise, Manual, and Modality Logs for complete treatment.       Assessment & Plan       Assessment  Assessment details: Pt exhibits progress per report of not having any pain since last session.  Pt reported fatigue with step ups and bridges.  Pt reported balance was off starting sit to stand. Then corrected.  Pt will benefit from continued PT.        Visit Diagnoses:    ICD-10-CM ICD-9-CM   1. Sacral pain  M53.3 724.6   2. Weakness of left hip  R29.898 729.89   3. Muscle weakness of proximal extremity  M62.81 728.87       Progress per Plan of Care and Progress strengthening /stabilization /functional activity           Timed:  Manual Therapy:         mins  39948;  Therapeutic Exercise:    23     mins  31119;     Neuromuscular Karan:        mins  23404;    Therapeutic Activity:     9     mins  31230;     Gait Training:           mins  32475;     Ultrasound:          mins  42902;    Electrical Stimulation:         mins  57646 ( );  Aquatic Therapy          mins  70921    Untimed:  Electrical Stimulation:         mins  75052 ( );  Mechanical Traction:         mins  94474;     Timed Treatment:   32   mins   Total Treatment:     32   mins    Electronically signed    Margarita Torres PTA  Physical Therapist Assistant    [employer] license: [id number]

## 2025-04-28 ENCOUNTER — TREATMENT (OUTPATIENT)
Dept: PHYSICAL THERAPY | Facility: CLINIC | Age: 45
End: 2025-04-28
Payer: COMMERCIAL

## 2025-04-28 DIAGNOSIS — M62.81 MUSCLE WEAKNESS OF PROXIMAL EXTREMITY: ICD-10-CM

## 2025-04-28 DIAGNOSIS — R29.898 WEAKNESS OF LEFT HIP: ICD-10-CM

## 2025-04-28 DIAGNOSIS — M53.3 SACRAL PAIN: Primary | ICD-10-CM

## 2025-04-28 PROCEDURE — 97530 THERAPEUTIC ACTIVITIES: CPT

## 2025-04-28 PROCEDURE — 97110 THERAPEUTIC EXERCISES: CPT

## 2025-04-28 NOTE — PROGRESS NOTES
Physical Therapy Daily Treatment Note      Patient: Shiva Varela   : 1980  Referring practitioner: Self Referring  Date of Initial Visit: No linked episodes  Today's Date: 2025  Patient seen for Visit count could not be calculated. Make sure you are using a visit which is associated with an episode. sessions           Subjective Questionnaire:       Subjective Evaluation    History of Present Illness    Subjective comment: Pt reports having sacral pain only with pressure to it and includes sitting directly on sacrum.       Objective   See Exercise, Manual, and Modality Logs for complete treatment.       Assessment & Plan       Assessment  Assessment details: Pt reports intermittent sacral pain and states it is about the same.  Pt tolerated progressed strengthening well.  Continue with POC.        Visit Diagnoses:    ICD-10-CM ICD-9-CM   1. Sacral pain  M53.3 724.6   2. Weakness of left hip  R29.898 729.89   3. Muscle weakness of proximal extremity  M62.81 728.87       Progress per Plan of Care and Progress strengthening /stabilization /functional activity           Timed:  Manual Therapy:         mins  04623;  Therapeutic Exercise:    23     mins  86388;     Neuromuscular Karan:        mins  58221;    Therapeutic Activity:     8     mins  55611;     Gait Training:           mins  06659;     Ultrasound:          mins  58060;    Electrical Stimulation:         mins  68677 ( );  Aquatic Therapy          mins  33532    Untimed:  Electrical Stimulation:         mins  76524 ( );  Mechanical Traction:         mins  21619;     Timed Treatment:   31   mins   Total Treatment:     31   mins    Electronically signed    Margarita Torres PTA  Physical Therapist Assistant    [employer] license: [id number]

## 2025-04-29 ENCOUNTER — TELEPHONE (OUTPATIENT)
Dept: FAMILY MEDICINE CLINIC | Facility: CLINIC | Age: 45
End: 2025-04-29

## 2025-04-30 ENCOUNTER — TREATMENT (OUTPATIENT)
Dept: PHYSICAL THERAPY | Facility: CLINIC | Age: 45
End: 2025-04-30
Payer: COMMERCIAL

## 2025-04-30 DIAGNOSIS — M62.81 MUSCLE WEAKNESS OF PROXIMAL EXTREMITY: ICD-10-CM

## 2025-04-30 DIAGNOSIS — R29.898 WEAKNESS OF LEFT HIP: ICD-10-CM

## 2025-04-30 DIAGNOSIS — M53.3 SACRAL PAIN: Primary | ICD-10-CM

## 2025-04-30 NOTE — PROGRESS NOTES
Physical Therapy Daily Treatment Note      Patient: Shiva Varela   : 1980  Referring practitioner: Self Referring  Date of Initial Visit: Type: THERAPY  Noted: 4/10/2025  Today's Date: 2025  Patient seen for 4 sessions           Subjective Questionnaire:       Subjective Evaluation    History of Present Illness    Subjective comment: Pt  reports he feels his sacrum area is sronger and pain has not changed.       Objective   See Exercise, Manual, and Modality Logs for complete treatment.       Assessment & Plan       Assessment  Assessment details: Pt reports no pain currently having pain when he sits on sacrum applying pressure it hurts but then releases.  Pt reports pain to touch.  Pt reported fatigue with first lap of side stepping and bridges.  Pt tolerated other strengthening well.         Visit Diagnoses:    ICD-10-CM ICD-9-CM   1. Sacral pain  M53.3 724.6   2. Weakness of left hip  R29.898 729.89   3. Muscle weakness of proximal extremity  M62.81 728.87       Progress per Plan of Care and Progress strengthening /stabilization /functional activity           Timed:  Manual Therapy:         mins  24615;  Therapeutic Exercise:    23     mins  73157;     Neuromuscular Karan:        mins  31473;    Therapeutic Activity:     9     mins  63843;     Gait Training:           mins  64259;     Ultrasound:          mins  63167;    Electrical Stimulation:         mins  41102 ( );  Aquatic Therapy          mins  76964    Untimed:  Electrical Stimulation:         mins  08281 ( );  Mechanical Traction:         mins  97554;     Timed Treatment:   32   mins   Total Treatment:     32   mins    Electronically signed    Margarita Torres PTA  Physical Therapist Assistant    [employer] license: [id number]

## 2025-05-05 ENCOUNTER — OFFICE VISIT (OUTPATIENT)
Dept: FAMILY MEDICINE CLINIC | Facility: CLINIC | Age: 45
End: 2025-05-05
Payer: COMMERCIAL

## 2025-05-05 ENCOUNTER — TREATMENT (OUTPATIENT)
Dept: PHYSICAL THERAPY | Facility: CLINIC | Age: 45
End: 2025-05-05
Payer: COMMERCIAL

## 2025-05-05 VITALS
OXYGEN SATURATION: 98 % | SYSTOLIC BLOOD PRESSURE: 115 MMHG | TEMPERATURE: 98 F | DIASTOLIC BLOOD PRESSURE: 75 MMHG | BODY MASS INDEX: 29.64 KG/M2 | HEART RATE: 60 BPM | WEIGHT: 251 LBS | HEIGHT: 77 IN

## 2025-05-05 DIAGNOSIS — R29.898 WEAKNESS OF LEFT HIP: ICD-10-CM

## 2025-05-05 DIAGNOSIS — M53.3 SACRAL PAIN: Primary | ICD-10-CM

## 2025-05-05 DIAGNOSIS — F90.0 ATTENTION DEFICIT HYPERACTIVITY DISORDER (ADHD), PREDOMINANTLY INATTENTIVE TYPE: Primary | ICD-10-CM

## 2025-05-05 DIAGNOSIS — K59.01 SLOW TRANSIT CONSTIPATION: ICD-10-CM

## 2025-05-05 DIAGNOSIS — Z00.00 ANNUAL PHYSICAL EXAM: ICD-10-CM

## 2025-05-05 DIAGNOSIS — M62.81 MUSCLE WEAKNESS OF PROXIMAL EXTREMITY: ICD-10-CM

## 2025-05-05 PROCEDURE — 97530 THERAPEUTIC ACTIVITIES: CPT

## 2025-05-05 PROCEDURE — 97110 THERAPEUTIC EXERCISES: CPT

## 2025-05-05 RX ORDER — LISDEXAMFETAMINE DIMESYLATE 40 MG/1
40 CAPSULE ORAL EVERY MORNING
Qty: 30 CAPSULE | Refills: 0 | Status: SHIPPED | OUTPATIENT
Start: 2025-05-05

## 2025-05-05 NOTE — PROGRESS NOTES
Chief Complaint   Patient presents with    Follow-up     3 month ADHD  Requesting refill on vyvanse  C/O nauseous         Subjective     Shiva Varela  has a past medical history of Autism spectrum disorder, Callus, Essential hypertension (04/30/2019), and Ulnar neuropathy at elbow of left upper extremity (09/10/2020).    Constipation-he has changed his diet.  He is doing more of a high-protein diet as of late.  He states with that he has become more constipated.  He has been doing a stool softener and some fiber capsules every day without much benefit.    ADD-he states that his attention and focus and concentration is pretty good.  He takes his Vyvanse on a daily basis.        PHQ-2 Depression Screening  Little interest or pleasure in doing things?     Feeling down, depressed, or hopeless?     PHQ-2 Total Score     PHQ-9 Depression Screening  Little interest or pleasure in doing things?     Feeling down, depressed, or hopeless?     Trouble falling or staying asleep, or sleeping too much?     Feeling tired or having little energy?     Poor appetite or overeating?     Feeling bad about yourself - or that you are a failure or have let yourself or your family down?     Trouble concentrating on things, such as reading the newspaper or watching television?     Moving or speaking so slowly that other people could have noticed? Or the opposite - being so fidgety or restless that you have been moving around a lot more than usual?     Thoughts that you would be better off dead, or of hurting yourself in some way?     PHQ-9 Total Score     If you checked off any problems, how difficult have these problems made it for you to do your work, take care of things at home, or get along with other people?       Allergies   Allergen Reactions    Bee Venom Unknown - Low Severity    Codeine Unknown - Low Severity    Latex Unknown - Low Severity     Persistent Contact        Prior to Admission medications    Medication Sig Start Date  End Date Taking? Authorizing Provider   fluticasone (FLONASE) 50 MCG/ACT nasal spray Administer 2 sprays into the nostril(s) as directed by provider Daily. 3/31/25  Yes Ed Myers DO   Lactobacillus (Florajen Acidophilus) capsule Take 1 capsule by mouth Daily. 3/28/25  Yes Mery Ramirez APRN   lisdexamfetamine (Vyvanse) 40 MG capsule Take 1 capsule by mouth Every Morning 3/31/25  Yes Ed Myers DO   metoprolol succinate XL (TOPROL-XL) 100 MG 24 hr tablet TAKE 1 TABLET BY MOUTH EVERY DAY 1/7/25  Yes Ed Myers DO   primidone (MYSOLINE) 50 MG tablet Take 1 tablet nightly 2/11/25  Yes Jeremy Warner MD   sertraline (ZOLOFT) 100 MG tablet Take 1 tablet by mouth Daily. 1/21/25  Yes Ed Myers DO   triamcinolone (KENALOG) 0.1 % ointment APPLY TOPICALLY TO THE APPROPRIATE AREA TWICE A DAY AS DIRECTED 12/9/24 5/5/25 Yes Ed Myers DO   omeprazole (priLOSEC) 20 MG capsule TAKE 1 CAPSULE BY MOUTH EVERY DAY  Patient not taking: Reported on 5/5/2025 1/7/25 5/5/25  Ed Myers DO        Patient Active Problem List   Diagnosis    Attention deficit hyperactivity disorder (ADHD)    Essential hypertension    Major depressive disorder, recurrent episode, in full remission    Difficulty urinating    Benign prostatic hyperplasia with weak urinary stream    Spondylolisthesis at L5-S1 level    Abdominal hernia    Eczema    Mixed hyperlipidemia    Ulnar neuropathy at elbow of left upper extremity    Vitamin D deficiency    Insect bite of right ankle    Annual physical exam    Essential tremor    Gastroesophageal reflux disease without esophagitis    Skin pustule    Bilateral hip pain    Sensory disorder    Skin lesion of chest wall    Dizziness    Numbness and tingling in both hands    Dysfunction of right eustachian tube    Slow transit constipation        Past Surgical History:   Procedure Laterality Date    APPENDECTOMY      HAND  "SURGERY      HERNIA REPAIR      KNEE ACL RECONSTRUCTION Left 2000    KNEE MENISCECTOMY  2015    lateral    WISDOM TOOTH EXTRACTION         Social History     Socioeconomic History    Marital status: Single   Tobacco Use    Smoking status: Never     Passive exposure: Never    Smokeless tobacco: Never   Vaping Use    Vaping status: Never Used   Substance and Sexual Activity    Alcohol use: Never    Drug use: Never    Sexual activity: Defer       Family History   Problem Relation Age of Onset    Heart disease Paternal Grandmother     Heart disease Paternal Grandfather     Cancer Paternal Grandfather     Diabetes Other        Family history, surgical history, past medical history, Allergies and meds reviewed with patient today and updated in [facility] EMR.     ROS:  Review of Systems   Constitutional:  Negative for fatigue.   Gastrointestinal:  Positive for abdominal pain and constipation. Negative for diarrhea.   Psychiatric/Behavioral:  Negative for decreased concentration.        OBJECTIVE:  Vitals:    05/05/25 1533   BP: 115/75   BP Location: Left arm   Patient Position: Sitting   Pulse: 60   Temp: 98 °F (36.7 °C)   SpO2: 98%   Weight: 114 kg (251 lb)   Height: 195.6 cm (77\")     No results found.   Body mass index is 29.76 kg/m².  No LMP for male patient.    The 10-year ASCVD risk score (Gopi DK, et al., 2019) is: 1.3%    Values used to calculate the score:      Age: 44 years      Sex: Male      Is Non- : No      Diabetic: No      Tobacco smoker: No      Systolic Blood Pressure: 115 mmHg      Is BP treated: Yes      HDL Cholesterol: 39 mg/dL      Total Cholesterol: 159 mg/dL     Physical Exam  Vitals and nursing note reviewed.   Constitutional:       General: He is not in acute distress.     Appearance: Normal appearance. He is normal weight.   HENT:      Head: Normocephalic.   Cardiovascular:      Rate and Rhythm: Normal rate and regular rhythm.      Heart sounds: Normal heart sounds. No " murmur heard.  Pulmonary:      Effort: Pulmonary effort is normal.      Breath sounds: Normal breath sounds. No wheezing or rhonchi.   Neurological:      Mental Status: He is alert and oriented to person, place, and time.   Psychiatric:         Mood and Affect: Mood normal.         Thought Content: Thought content normal.         Judgment: Judgment normal.         Procedures    No visits with results within 30 Day(s) from this visit.   Latest known visit with results is:   Office Visit on 03/28/2025   Component Date Value Ref Range Status    Volume 03/28/2025 0 ML   Final       ASSESSMENT/ PLAN:    Diagnoses and all orders for this visit:    1. Attention deficit hyperactivity disorder (ADHD), predominantly inattentive type (Primary)  Assessment & Plan:  He is doing well at this time with his current medication and dosage.    Orders:  -     lisdexamfetamine (Vyvanse) 40 MG capsule; Take 1 capsule by mouth Every Morning  Dispense: 30 capsule; Refill: 0    2. Slow transit constipation  Assessment & Plan:  He has had some constipation more so related to his change in diet.  He needs to make sure he is drinking plenty of fluids and he will add some MiraLAX once or twice a day.      3. Annual physical exam  -     lisdexamfetamine (Vyvanse) 40 MG capsule; Take 1 capsule by mouth Every Morning  Dispense: 30 capsule; Refill: 0               Orders Placed Today:     New Medications Ordered This Visit   Medications    lisdexamfetamine (Vyvanse) 40 MG capsule     Sig: Take 1 capsule by mouth Every Morning     Dispense:  30 capsule     Refill:  0        Management Plan:     An After Visit Summary was printed and given to the patient at discharge.    Follow-up: Return in about 3 months (around 8/5/2025) for Recheck.    Ed Myers DO 5/5/2025 15:55 EDT  This note was electronically signed.

## 2025-05-05 NOTE — ASSESSMENT & PLAN NOTE
He has had some constipation more so related to his change in diet.  He needs to make sure he is drinking plenty of fluids and he will add some MiraLAX once or twice a day.

## 2025-05-05 NOTE — PROGRESS NOTES
Physical Therapy Daily Treatment Note      Patient: Shiva Varela   : 1980  Referring practitioner: Self Referring  Date of Initial Visit: Type: THERAPY  Noted: 4/10/2025  Today's Date: 2025  Patient seen for 5 sessions           Subjective Questionnaire:       Subjective Evaluation    History of Present Illness    Subjective comment: Pt reports he feels real good today with no report of pain.       Objective   See Exercise, Manual, and Modality Logs for complete treatment.       Assessment & Plan       Assessment  Assessment details: Pt reports he fells good today exhibiting progress..  Pt reported he did skip his HEP a couple of days.  Pt tolerated strengthening well being fatigued with standing exercise as inside step and monster walk and standing hip exercise with wts.  Pt is progressing and will benefit from continued PT.        Visit Diagnoses:    ICD-10-CM ICD-9-CM   1. Sacral pain  M53.3 724.6   2. Weakness of left hip  R29.898 729.89   3. Muscle weakness of proximal extremity  M62.81 728.87       Progress per Plan of Care and Progress strengthening /stabilization /functional activity           Timed:  Manual Therapy:         mins  90856;  Therapeutic Exercise:    20     mins  71433;     Neuromuscular Karan:        mins  03322;    Therapeutic Activity:     8     mins  83907;     Gait Training:           mins  61118;     Ultrasound:          mins  65211;    Electrical Stimulation:         mins  77349 ( );  Aquatic Therapy          mins  69655    Untimed:  Electrical Stimulation:         mins  46928 ( );  Mechanical Traction:         mins  37098;     Timed Treatment:   28   mins   Total Treatment:     28   mins    Electronically signed    Margarita Torres PTA  Physical Therapist Assistant    [place] license: [id number]

## 2025-05-07 ENCOUNTER — TREATMENT (OUTPATIENT)
Dept: PHYSICAL THERAPY | Facility: CLINIC | Age: 45
End: 2025-05-07
Payer: COMMERCIAL

## 2025-05-07 DIAGNOSIS — R29.898 WEAKNESS OF LEFT HIP: ICD-10-CM

## 2025-05-07 DIAGNOSIS — M53.3 SACRAL PAIN: Primary | ICD-10-CM

## 2025-05-07 DIAGNOSIS — M62.81 MUSCLE WEAKNESS OF PROXIMAL EXTREMITY: ICD-10-CM

## 2025-05-07 NOTE — PROGRESS NOTES
Progress Note / Discharge Summary   [facility]: [address] [facility]   [place] [id number]      Patient: Shiva Varela   : 1980  Diagnosis/ICD-10 Code:  Sacral pain [M53.3]  Referring practitioner: Self Referring  Date of Initial Visit: Type: THERAPY  Noted: 4/10/2025  Encounter Date:  2025  Patient seen for 6 sessions      Subjective:   Subjective Questionnaire: LEFS: 71/80  Clinical Progress: improved  Home Program Compliance: Yes  Treatment has included: balance/weight-bearing training, ADL retraining, soft tissue mobilization, strengthening, stretching, therapeutic activities, manual therapy, joint mobilization, home exercise program/patient education, gait training, functional ROM exercises, flexibility, body mechanics training, postural training, and neuromuscular re-education    Subjective   Shiva Varela reports they are doing much better. Pt reports they can have some symptoms at times, though not as frequently as they were coming on. Pt notes they have been working on their HEP at home in conjunction w/ other exs.       Objective   Active Range of Motion   Left Hip   External rotation (90/90): 40 degrees   Internal rotation (90/90): 33 degrees      Right Hip   External rotation (90/90): 50 degrees   Internal rotation (90/90): 35 degrees      Strength/Myotome Testing      Left Hip   Planes of Motion   Flexion: 5  Abduction: 5  External rotation: 5  Internal rotation: 5     Right Hip   Planes of Motion   Flexion: 5  Abduction: 5  External rotation: 5  Internal rotation: 5     Left Knee   Extension: 5     Right Knee   Extension: 5     Functional Assessment      Single Leg Stance - Eyes Open   Left  Trial 1: 30 seconds     Right  Trial 1: 30 seconds       See Exercise, Manual, and Modality Logs for complete treatment.     Assessment/Plan  Pt reported to therapy w/ complaints w/ B hip pain and sacral pain. Pt has performed well in therapy and has met all therapeutic goals. Discussed HEP w/  pt, as well as progression to current exs. Pt was encouraged to continue working on strengthening their hips and legs, for continued management of symptoms. Pt is discharged at this time w/ their HEP.       Goals  Plan Goals: HIP PROBLEMS     1. The patient complains of L>R hip pain.              LTG 1: 12 weeks:  The patient will report a pain rating of 0/10 or better in order to improve  tolerance to activities of daily living and improve sleep quality.                          STATUS:  met              STG 1a: 6 weeks:  The patient will report a pain rating of 1/10 or better.                          STATUS:  met              TREATMENT:  Therapeutic exercises, manual therapy, aquatic therapy, home exercise   instruction, and modalities as needed for pain to include:  electrical stimulation, moist heat, ice,   ultrasound, and diathermy.     2. The patient demonstrates weakness of the L hip.              LTG 2: 12 weeks:  The patient will demonstrate 5/5 strength for L hip flexion, abduction,  and extension in order to improve hip stability.                          STATUS:  met              STG 2a: 6 weeks:  The patient will demonstrate 4+/5 strength for L hip flexion, abduction,  and extension.                          STATUS:  met              TREATMENT: Therapeutic exercises, manual therapy, aquatic therapy, home exercise instruction,  and modalities as needed for pain to include:  electrical stimulation, moist heat, ice, and ultrasound                3. The patient has poor balance and gait impairment.   LTG 3: 12 weeks: The patient will demonstrate the ability to sustain L single leg stance with hips level for 10 seconds for improved gait mechanics and balance.                           STATUS:  met              STG 3a: 6 weeks: The patient will demonstrate the ability to sustain L single leg stance with hips level for 5 seconds                          STATUS:  met               TREATMENT: Manual therapy,  therapeutic exercise, home exercise instruction, aquatic therapy, and modalities as needed to include:  moist heat, electrical stimulation, ultrasound, and ice.     4. Mobility: Walking/Moving Around Functional Limitation                               LTG 4: 12 weeks:  The patient will demonstrate 12.5% limitation by achieving a score of 70/80 on the LEFS.                          STATUS:  met              STG 4 a: 6 weeks:  The patient will demonstrate 25% limitation by achieving a score of 60/80 on the LEFS.                            STATUS:  met              TREATMENT:  Manual therapy, therapeutic exercise, home exercise instruction, and modalities as needed to include: moist heat, electrical stimulation, and ultrasound.     Progress toward previous goals: All Met      Recommendations: Discharge    PT Signature: Noman Benz PT, DPT    Electronically signed 2025    [place] License: PT - [id number]       Timed:  Manual Therapy:    0     mins  35225;  Therapeutic Exercise:    10     mins  47953;     Neuromuscular Karan:    0    mins  57140;    Therapeutic Activity:     0     mins  98850;     Gait Trainin     mins  33892;     Aquatics                         0      mins  71968  Self Care                        0      mins  38761    Un-timed:  Mechanical Traction      0     mins  64410  Dry Needling     0     mins self-pay  Electrical Stimulation:    0     mins  83242 ( );  Re-Eval:         10     mins  96354    Timed Treatment:   10   mins   Total Treatment:     20   mins

## 2025-05-07 NOTE — PROGRESS NOTES
Chief Complaint: Prostatitis, unspecified prostatitis type (3mo. F/u)    Patient or patient representative verbalized consent for the use of Ambient Listening during the visit with  SHAAN Valentin for chart documentation. 5/8/2025  08:16 EDT    Subjective         History of Present Illness  Shiva Varela is a 44 y.o. male presents to [facility] to be seen for follow-up.    The patient was previously seen the office on 3/28/2025 for prostatitis, weak urinary stream, abnormal ejaculation/painful ejaculation, urinary frequency and nocturia.  At that visit, the patient was started on a 4-week course of doxycycline.  The patient had already tried tamsulosin, but could not tolerate that medication due to dizziness and grogginess.  He had also tried Hytrin which made him lightheaded.  We did discuss the possibility of trying alfuzosin, but decided to hold off on that at that time.  We did discuss that we could consider cystoscopy if his symptoms did not improve with treatment with antibiotics.  The patient is here today to follow-up.    The patient reports that the trial of doxycycline for prostatitis was initiated but did not yield any noticeable improvement in his symptoms. He continues to experience difficulty with urination, particularly at night, and occasional sharp pains when sitting, which cause reflexive contraction of his PC muscle. He also reports a change in bowel movements from well-formed stools to constipation following a dietary change that included the elimination of oatmeal. Despite this, he feels better overall and no longer experiences gut pain. He has not observed any semen leakage during bowel movements since the dietary change and initiation of doxycycline. However, he does report morning semen leakage if he does not urinate immediately upon waking. He experiences pain during urination when the stream is strong. He does not report any hematuria. He also reports pain in  the area above and behind his scrotum, which he finds difficult to describe. He does not experience burning with urination but does report a burning sensation immediately after urination. He notes that his urinary stream was particularly strong for one night, two days after completing his antibiotic course, but it has since returned to its usual fluctuating pattern. He reports no pain with ejaculation but does experience frequent urination and nocturia. He has been experiencing these symptoms since he was 20 years old, with a noted worsening over the past 5 years. He reports normal ejaculation during intercourse or masturbation but does experience morning semen leakage if he does not urinate immediately upon waking. He has not noticed any semen leakage with urination or bowel movements recently. He is open to trying pelvic floor physical therapy and a second course of antibiotics but is hesitant to try another medication in the same family as tamsulosin and terazosin due to previous adverse reactions. He is curious about the potential long-term consequences of a cystoscopy and whether it could identify any muscle issues. He also inquires about the cost of the procedure. He describes his current symptoms as feeling like he is urinating through a straw, requiring him to build up pressure to initiate urination, which then trickles down and stops for 10 to 15 seconds before he can build up pressure again.    MEDICATIONS  Past: Tamsulosin, terazosin, doxycycline      Previous visit 3/28/2025:  Shiva Varela is a 44 y.o. male presents to [facility] to be seen for dysuria and BPH with weak urinary stream.     The patient reports that he has noticed that he has had increasing issues with a weak urinary stream since the age of 20.  He reports that his urinary stream does seem slow and he does occasionally have dribbling of urine.  He reports that he did have an episode about 2 weeks ago where he  "felt like he was \"stopped up\" and like he could not empty his bladder, but once he was able to relax himself he could empty his bladder better.  He reports that sometimes he has to do certain activities that seem to help him relax so that he can relax his bladder to urinate normally.  He reports that those activities could be something like touching something cold or touching his lips.  He reports that he has also noticed abnormal passage of semen after urination (with him stripping the penis/urethra to make sure that he is completely empty after urinating he will sometimes pass a small amount of semen) and will also sometimes leak semen when having a bowel movement.  He reports that sometimes he will pass semen randomly like when he is making breakfast.  He reports that the abnormal passage of semen has been an issue for a couple of years.  He has also noticed occasional burning with ejaculation a couple of times in the last year or so.  He denies any dysuria or regular penile discharge.  He denies any concern for STI.  He reports that when he ejaculates with sexual intercourse or masturbation that sometimes it seems like his semen comes out more in spurts and not all at the same time.  He reports that his primary care provider has previously tried him on Flomax, but that seemed to make him super dizzy and groggy, and also Hytrin, but that made him quite lightheaded.  He really was not able to tolerate either of these medications.  He reports that both of these medicines did help with the issues with his urinary stream somewhat, but it did not seem to affect the abnormal passage of semen.  When questioning the patient regarding his urinary symptoms and history, the patient does report that he does have pain and a \"warm feeling\" to the perineal area.  He reports that a couple of months ago he was having sharper pain in the perineal area, but that has eased up.  He does report that he occasionally has issues with " "urinary frequency, but this is not every time for every day.  He also reports that he usually gets up about 2 or 3 times per night on average to urinate.  He has never been diagnosed or treated for a prostatitis.           Urinary symptoms/history:  Frequency-sometimes     Urgency-denies     Incontinence-denies     Nocturia-admits, 2-3 X on average      Dysuria-denies      Perineal pain-admits, describes this as a \"warm feeling\" to the perineal area, had sharper pain to the perineal area a couple of months ago     Stream-weak, slow, sometimes dribbles     GH-denies      History of stones-denies       surgeries-denies      Family history of  malignancy-denies      Cardiopulmonary-HTN, ADAD, autism      Anticoagulants-none      Smoker-denies      PSA  12/4/2023 0.534     Objective     Past Medical History:   Diagnosis Date    Autism spectrum disorder     Callus     Essential hypertension 04/30/2019    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 IF it persists then EMG       Past Surgical History:   Procedure Laterality Date    APPENDECTOMY      HAND SURGERY      HERNIA REPAIR      KNEE ACL RECONSTRUCTION Left 2000    KNEE MENISCECTOMY  2015    lateral    WISDOM TOOTH EXTRACTION           Current Outpatient Medications:     fluticasone (FLONASE) 50 MCG/ACT nasal spray, Administer 2 sprays into the nostril(s) as directed by provider Daily., Disp: 16 g, Rfl: 2    Lactobacillus (Florajen Acidophilus) capsule, Take 1 capsule by mouth Daily., Disp: 28 capsule, Rfl: 0    lisdexamfetamine (Vyvanse) 40 MG capsule, Take 1 capsule by mouth Every Morning, Disp: 30 capsule, Rfl: 0    metoprolol succinate XL (TOPROL-XL) 100 MG 24 hr tablet, TAKE 1 TABLET BY MOUTH EVERY DAY, Disp: 90 tablet, Rfl: 1    primidone (MYSOLINE) 50 MG tablet, Take 1 tablet nightly, Disp: 90 tablet, Rfl: 2    sertraline (ZOLOFT) 100 MG tablet, Take 1 tablet by mouth " "Daily., Disp: 90 tablet, Rfl: 1    sulfamethoxazole-trimethoprim (Bactrim DS) 800-160 MG per tablet, Take 1 tablet by mouth 2 (Two) Times a Day for 14 days., Disp: 28 tablet, Rfl: 0    Allergies   Allergen Reactions    Bee Venom Unknown - Low Severity    Codeine Unknown - Low Severity    Latex Unknown - Low Severity     Persistent Contact         Family History   Problem Relation Age of Onset    Heart disease Paternal Grandmother     Heart disease Paternal Grandfather     Cancer Paternal Grandfather     Diabetes Other        Social History     Socioeconomic History    Marital status: Single   Tobacco Use    Smoking status: Never     Passive exposure: Never    Smokeless tobacco: Never   Vaping Use    Vaping status: Never Used   Substance and Sexual Activity    Alcohol use: Never    Drug use: Never    Sexual activity: Defer       Vital Signs:   Resp 12   Ht 195.6 cm (77\")   Wt 114 kg (251 lb)   BMI 29.76 kg/m²      Physical Exam  Vitals and nursing note reviewed.   Constitutional:       General: He is not in acute distress.     Appearance: Normal appearance. He is not toxic-appearing.   Pulmonary:      Effort: Pulmonary effort is normal. No respiratory distress.   Neurological:      General: No focal deficit present.      Mental Status: He is alert and oriented to person, place, and time.          Result Review :   The following data was reviewed by: SHAAN Valentin on 05/08/2025:  Results for orders placed or performed in visit on 05/08/25   Bladder Scan    Collection Time: 05/08/25  8:11 AM   Result Value Ref Range    Urine Volume 0       Bladder Scan interpretation 05/08/2025    Estimation of residual urine via BVI 3000 [address] Bladder Scan  MA/nurse performing: CHAKA Bowman  Residual Urine: 0 ml  Indication: Chronic prostatitis    Weak urinary stream    Urinary frequency    Nocturia    Leaking semen    Dysuria    Prostate cancer screening   Position: Supine  Examination: Incremental scanning of the " suprapubic area using 2.0 MHz transducer using copious amounts of acoustic gel.   Findings: An anechoic area was demonstrated which represented the bladder, with measurement of residual urine as noted. I inspected this myself. In that the residual urine was stable or insignificant, refer to plan for treatment and plan necessary at this time.         Results          Procedures        Assessment and Plan    Diagnoses and all orders for this visit:    1. Chronic prostatitis (Primary)  -     Bladder Scan  -     sulfamethoxazole-trimethoprim (Bactrim DS) 800-160 MG per tablet; Take 1 tablet by mouth 2 (Two) Times a Day for 14 days.  Dispense: 28 tablet; Refill: 0  -     Ambulatory Referral to Physical Therapy for Evaluation & Treatment  -     Cystoscopy; Future    2. Weak urinary stream  -     Cystoscopy; Future    3. Urinary frequency    4. Nocturia    5. Leaking semen    6. Dysuria  -     Urine Culture - Urine, Urine, Clean Catch; Future  -     Urine Culture - Urine, Urine, Clean Catch    7. Prostate cancer screening  -     PSA Screen; Future        Assessment & Plan    Symptoms suggest possible chronic prostatitis.  The patient reports intermittent pain and discomfort post-urination. The previous treatment with doxycycline was ineffective, and he has experienced adverse reactions to other medications for an enlarged prostate, including tamsulosin and terazosin. A second course of antibiotics, specifically Bactrim, will be initiated for a duration of 2 weeks. A referral for pelvic floor physical therapy has been made to address the discomfort and pain. A cystoscopy will be scheduled with Dr. Interiano for further evaluation of the patient's symptoms. A urine culture will be sent to the lab to rule out any infection. A PSA test will be conducted today to screen for prostate cancer.     Will schedule the patient for cystoscopy with Dr. Interiano.  I will plan to follow-up with the patient as directed by Dr. Interiano  following the patient's cystoscopy.      Follow Up   Return for Cystoscopy with Dr. Interiano .  Patient was given instructions and counseling regarding his condition or for health maintenance advice. Please see specific information pulled into the AVS if appropriate.         This document has been electronically signed by SHAAN Valentin  May 8, 2025 13:54 EDT

## 2025-05-08 ENCOUNTER — LAB (OUTPATIENT)
Facility: HOSPITAL | Age: 45
End: 2025-05-08
Payer: COMMERCIAL

## 2025-05-08 ENCOUNTER — OFFICE VISIT (OUTPATIENT)
Dept: UROLOGY | Age: 45
End: 2025-05-08
Payer: COMMERCIAL

## 2025-05-08 VITALS — BODY MASS INDEX: 29.64 KG/M2 | RESPIRATION RATE: 12 BRPM | HEIGHT: 77 IN | WEIGHT: 251 LBS

## 2025-05-08 DIAGNOSIS — R30.0 DYSURIA: ICD-10-CM

## 2025-05-08 DIAGNOSIS — R35.0 URINARY FREQUENCY: ICD-10-CM

## 2025-05-08 DIAGNOSIS — R35.1 NOCTURIA: ICD-10-CM

## 2025-05-08 DIAGNOSIS — N50.89: ICD-10-CM

## 2025-05-08 DIAGNOSIS — Z12.5 PROSTATE CANCER SCREENING: ICD-10-CM

## 2025-05-08 DIAGNOSIS — N41.1 CHRONIC PROSTATITIS: Primary | ICD-10-CM

## 2025-05-08 DIAGNOSIS — R39.12 WEAK URINARY STREAM: ICD-10-CM

## 2025-05-08 LAB
PSA SERPL-MCNC: 0.56 NG/ML (ref 0–4)
URINE VOLUME: 0

## 2025-05-08 PROCEDURE — 36415 COLL VENOUS BLD VENIPUNCTURE: CPT

## 2025-05-08 PROCEDURE — 99213 OFFICE O/P EST LOW 20 MIN: CPT | Performed by: NURSE PRACTITIONER

## 2025-05-08 PROCEDURE — 87086 URINE CULTURE/COLONY COUNT: CPT | Performed by: NURSE PRACTITIONER

## 2025-05-08 PROCEDURE — G0103 PSA SCREENING: HCPCS

## 2025-05-08 RX ORDER — SULFAMETHOXAZOLE AND TRIMETHOPRIM 800; 160 MG/1; MG/1
1 TABLET ORAL 2 TIMES DAILY
Qty: 28 TABLET | Refills: 0 | Status: SHIPPED | OUTPATIENT
Start: 2025-05-08 | End: 2025-05-22

## 2025-05-09 LAB — BACTERIA SPEC AEROBE CULT: NO GROWTH

## 2025-06-02 DIAGNOSIS — F90.0 ATTENTION DEFICIT HYPERACTIVITY DISORDER (ADHD), PREDOMINANTLY INATTENTIVE TYPE: ICD-10-CM

## 2025-06-02 DIAGNOSIS — Z00.00 ANNUAL PHYSICAL EXAM: ICD-10-CM

## 2025-06-02 RX ORDER — LISDEXAMFETAMINE DIMESYLATE 40 MG/1
40 CAPSULE ORAL EVERY MORNING
Qty: 30 CAPSULE | Refills: 0 | Status: SHIPPED | OUTPATIENT
Start: 2025-06-02

## 2025-06-02 NOTE — TELEPHONE ENCOUNTER
Caller: Shiva Varela    Relationship: Self    Best call back number: [phone]     Requested Prescriptions:   Requested Prescriptions     Pending Prescriptions Disp Refills    lisdexamfetamine (Vyvanse) 40 MG capsule 30 capsule 0     Sig: Take 1 capsule by mouth Every Morning        Pharmacy where request should be sent: [facility] #[id number] [address] [phone] [place] [phone]      Last office visit with prescribing clinician: 5/5/2025   Last telemedicine visit with prescribing clinician: Visit date not found   Next office visit with prescribing clinician: 8/5/2025     Does the patient have less than a 3 day supply:  [x] Yes  [] No    Would you like a call back once the refill request has been completed: [] Yes [] No    If the office needs to give you a call back, can they leave a voicemail: [] Yes [] No    Demetria Elizondo Rep   06/02/25 11:26 EDT

## 2025-06-06 ENCOUNTER — TELEPHONE (OUTPATIENT)
Dept: FAMILY MEDICINE CLINIC | Facility: CLINIC | Age: 45
End: 2025-06-06

## 2025-06-06 NOTE — TELEPHONE ENCOUNTER
Caller: Shiva Varela    Relationship: Self    Best call back number: [phone]     What medication are you requesting: VYVANSE LOWER DOSE         If a prescription is needed, what is your preferred pharmacy and phone number: [facility] #[id number] [address] & [facility] - [phone]  - [phone]      Additional notes:PATIENT HAS NOT PICKED UP THE ORDER FOR THE 40 MG DOSE. PATIENT IS ASKING FOR THE DOSE TO BE LOWERED TO SLOWLY TAPER OFF THE MEDICATION IF POSSIBLE    PATIENT ASKS THAT THE 40 MG BE CANCELLED     PATIENT WOULD LIKE TO TAKE THIS LOWER DOSE FOR SEVERAL BEFORE TRYING TO LOWER IT AGAIN

## 2025-06-06 NOTE — TELEPHONE ENCOUNTER
Caller: Shiva Varela    Relationship to patient: Self    Best call back number: [phone] CALLER STATED THAT HE IS NEEDING TO BE CONTACTED WHEN SENT TO THE PHARMACY      What medication are you requesting: VYVANSE LOWER DOSE       If a prescription is needed, what is your preferred pharmacy and phone number: [facility] #[id number] [phone]  - [phone]       Additional notes:PATIENT HAS NOT PICKED UP THE ORDER FOR THE 40 MG DOSE. PATIENT IS ASKING FOR THE DOSE TO BE LOWERED TO SLOWLY TAPER OFF THE MEDICATION IF POSSIBLE     PATIENT ASKS THAT THE 40 MG BE CANCELLED  - AND HE IS ASKING SPECIFICALLY FOR 30 MG VYVANSE      PATIENT WOULD LIKE TO TAKE THIS LOWER DOSE FOR SEVERAL BEFORE TRYING TO LOWER IT AGAIN

## 2025-06-09 ENCOUNTER — TELEPHONE (OUTPATIENT)
Dept: UROLOGY | Age: 45
End: 2025-06-09
Payer: COMMERCIAL

## 2025-06-09 DIAGNOSIS — R39.12 WEAK URINARY STREAM: ICD-10-CM

## 2025-06-09 DIAGNOSIS — N53.19 ABNORMAL EJACULATION: Primary | ICD-10-CM

## 2025-06-09 NOTE — TELEPHONE ENCOUNTER
PATIENT WAS CALLED AND ADVISED THAT [facility] DOES NOT HAVE MALE PELVIC FLOOR THERAPY.  PER PATIENT'S REQUEST ORDER WAS FAXED TO [facility] IN [place].

## 2025-06-09 NOTE — TELEPHONE ENCOUNTER
Patient stated he thinks he needs to be reevaluated if this medication is even needed, does not like being on a stimulant. Patient noticed when the dosage was lowered the symptoms of shakiness he was having stopped occurring. Patient has appt on the 13th to further discuss this with you as well.

## 2025-06-09 NOTE — TELEPHONE ENCOUNTER
PHYSICAL THERAPY TRANSFERRED PT PHONE CALL TO US STATING THEY DO NOT DO PELVIC FLOOR THERAPY ON [facility].  PT WOULD LIKE TO KNOW WHAT HIS OPTIONS ARE AND WHAT HE SHOULD DO NEXT.  PLEASE CALL BACK TO [facility].    CALL BACK NUMBER:  [phone]

## 2025-06-17 ENCOUNTER — OFFICE VISIT (OUTPATIENT)
Dept: NEUROLOGY | Facility: CLINIC | Age: 45
End: 2025-06-17
Payer: COMMERCIAL

## 2025-06-17 VITALS
HEART RATE: 75 BPM | DIASTOLIC BLOOD PRESSURE: 81 MMHG | WEIGHT: 248.89 LBS | HEIGHT: 77 IN | BODY MASS INDEX: 29.39 KG/M2 | SYSTOLIC BLOOD PRESSURE: 125 MMHG

## 2025-06-17 DIAGNOSIS — G25.0 ESSENTIAL TREMOR: Primary | ICD-10-CM

## 2025-06-17 PROCEDURE — 99214 OFFICE O/P EST MOD 30 MIN: CPT | Performed by: PSYCHIATRY & NEUROLOGY

## 2025-06-17 RX ORDER — PRIMIDONE 50 MG/1
TABLET ORAL
Qty: 180 TABLET | Refills: 3 | Status: SHIPPED | OUTPATIENT
Start: 2025-06-17

## 2025-06-17 NOTE — PROGRESS NOTES
Chief Complaint  Follow-up (Med check-primidone.)    Subjective          Shiva Varela is a 44 y.o. male who presents to [facility]  History of Present Illness      History of Present Illness  The patient presents for evaluation of tremors.    He reports experiencing tremors in his hands, which are particularly bothersome during the late morning hours, typically between 10:00 AM and 12:00 PM. These tremors are not constant but occur on several days, often while typing at the keyboard. He also notes that the tremors tend to worsen after physical exertion, such as weightlifting, but improve after a few hours of rest. A slight improvement in tremors was observed following a reduction in Vyvanse dosage from 50 mg to 40 mg. Additionally, he mentions that his lip twitches approximately once or twice a week. He is currently on a nightly regimen of primidone 100 mg, which he believes was more effective when he started a new refill.    He takes Vyvanse at around 7:30 AM for ADHD. Recently, he was diagnosed with autism spectrum disorder by the [facility] [place] after taking some tests due to difficulties explaining certain things at work. Conversations with friends at [employer], including a former  counselor, suggested he check if he is on the spectrum. He ranks high in cognitive autism. His primary care physician is Dr. Ed Ferreira, who checks his blood work periodically.    INTERVAL: Since last visit in 01/2024, he has experienced a death in the family and canceled an appointment.    SOCIAL HISTORY:    Occupations: Computer programming, works from home    Coffee/Tea/Caffeine-containing Drinks: Consumes coffee, noted that it worsens tremors    FAMILY HISTORY  - Father: Essential tremor, more severe than the patient's, age 78.    MEDICATIONS  CURRENT MEDS:  Primidone 100 mg Oral Once daily  Vyvanse 40 mg Oral Once daily  PREVIOUS MEDS:  Vyvanse 50 mg Oral Once daily   "Reason for Discontinuation: Decreased dosage to reduce tremor      Objective   Vital Signs:   /81 (BP Location: Right arm, Patient Position: Sitting, Cuff Size: Adult)   Pulse 75   Ht 195.6 cm (77.01\")   Wt 113 kg (248 lb 14.2 oz)   BMI 29.51 kg/m²     Physical Exam   Alert, fluent, phasic, follows commands well.  There is no resting tremor.  There is a mild tremor noted on finger-to-nose testing as he reaches the target.  There is no tremor noted on Archimedes spiral.  There is a mild tremor noted transferring water from 1 cup to the next on his right hand but not the left hand.  There is no tremor noted pretending to drink out of a cup on either hand.  Station gait is unremarkable.  Heart is regular rhythm normal in rate.     Results  Reviewed recent labs         Assessment and Plan  Diagnoses and all orders for this visit:    1. Essential tremor (Primary)         Assessment & Plan  1. Essential tremor.  The patient's symptoms are consistent with essential tremor, which is exacerbated by factors such as caffeine intake, sleep deprivation, and physical activity. The possibility of a physiologic tremor overlaying the essential tremor was discussed. He was advised to continue his current medication regimen, with the option to either take primidone 50 mg twice daily or 100 mg once in the morning. A prescription for primidone 50 mg, sufficient for a 3-month supply with three refills, was provided and sent to [facility].    2. Autism spectrum disorder.  The patient reported a recent diagnosis of autism spectrum disorder, confirmed by the [facility] [place]. He mentioned difficulties with social interactions and understanding social cues. No new treatment was initiated during this visit.    3. Attention deficit hyperactivity disorder (ADHD).  The patient is currently taking Vyvanse 40 mg daily for ADHD, which he takes at 7:30 AM. He reported a reduction in jitteriness after decreasing the dose from " 50 mg to 40 mg. The potential side effects of Vyvanse, including jitteriness, were discussed.    Follow-up  The patient will follow up in 1 year.      Total time spent with the patient and coordinating patient care was 30 minutes.    Follow Up  No follow-ups on file.  Patient was given instructions and counseling regarding his condition or for health maintenance advice. Please see specific information pulled into the AVS if appropriate.     Patient or patient representative verbalized consent for the use of Ambient Listening during the visit with  Jeremy Warner MD for chart documentation. 6/17/2025  09:26 EDT

## 2025-06-24 ENCOUNTER — OFFICE VISIT (OUTPATIENT)
Dept: FAMILY MEDICINE CLINIC | Facility: CLINIC | Age: 45
End: 2025-06-24
Payer: COMMERCIAL

## 2025-06-24 VITALS
SYSTOLIC BLOOD PRESSURE: 116 MMHG | TEMPERATURE: 98 F | DIASTOLIC BLOOD PRESSURE: 73 MMHG | BODY MASS INDEX: 29.16 KG/M2 | WEIGHT: 247 LBS | HEIGHT: 77 IN | HEART RATE: 78 BPM | OXYGEN SATURATION: 96 %

## 2025-06-24 DIAGNOSIS — F90.0 ATTENTION DEFICIT HYPERACTIVITY DISORDER (ADHD), PREDOMINANTLY INATTENTIVE TYPE: Primary | ICD-10-CM

## 2025-06-24 PROCEDURE — 99213 OFFICE O/P EST LOW 20 MIN: CPT | Performed by: FAMILY MEDICINE

## 2025-06-24 RX ORDER — ATOMOXETINE 80 MG/1
80 CAPSULE ORAL DAILY
Qty: 30 CAPSULE | Refills: 1 | Status: SHIPPED | OUTPATIENT
Start: 2025-06-24

## 2025-06-24 NOTE — PROGRESS NOTES
Chief Complaint   Patient presents with    Medication Problem     Requesting an decrease on vyvanse or replacement         Subjective     Shiva Varela  has a past medical history of Autism spectrum disorder, Callus, Essential hypertension (04/30/2019), and Ulnar neuropathy at elbow of left upper extremity (09/10/2020).    ADD-he has been on both Adderall XR and currently Vyvanse.  He feels that the Vyvanse makes his tremors worse and oftentimes has cold hands.  He would like to see about doing a nonstimulant form of medication.        PHQ-2 Depression Screening  Little interest or pleasure in doing things?     Feeling down, depressed, or hopeless?     PHQ-2 Total Score     PHQ-9 Depression Screening  Little interest or pleasure in doing things?     Feeling down, depressed, or hopeless?     Trouble falling or staying asleep, or sleeping too much?     Feeling tired or having little energy?     Poor appetite or overeating?     Feeling bad about yourself - or that you are a failure or have let yourself or your family down?     Trouble concentrating on things, such as reading the newspaper or watching television?     Moving or speaking so slowly that other people could have noticed? Or the opposite - being so fidgety or restless that you have been moving around a lot more than usual?     Thoughts that you would be better off dead, or of hurting yourself in some way?     PHQ-9 Total Score     If you checked off any problems, how difficult have these problems made it for you to do your work, take care of things at home, or get along with other people?       Allergies   Allergen Reactions    Bee Venom Unknown - Low Severity    Codeine Unknown - Low Severity    Latex Unknown - Low Severity     Persistent Contact        Prior to Admission medications    Medication Sig Start Date End Date Taking? Authorizing Provider   fluticasone (FLONASE) 50 MCG/ACT nasal spray Administer 2 sprays into the nostril(s) as directed by  provider Daily. 3/31/25  Yes Ed Myers DO   Lactobacillus (Florajen Acidophilus) capsule Take 1 capsule by mouth Daily. 3/28/25  Yes Mery Ramirez APRN   lisdexamfetamine (Vyvanse) 40 MG capsule Take 1 capsule by mouth Every Morning 6/2/25  Yes Ed Myers DO   metoprolol succinate XL (TOPROL-XL) 100 MG 24 hr tablet TAKE 1 TABLET BY MOUTH EVERY DAY 1/7/25  Yes Ed Myers DO   primidone (MYSOLINE) 50 MG tablet Take 2 tablets daily 6/17/25  Yes Jeremy Warner MD   sertraline (ZOLOFT) 100 MG tablet Take 1 tablet by mouth Daily. 1/21/25  Yes Ed Myers DO        Patient Active Problem List   Diagnosis    Attention deficit hyperactivity disorder (ADHD)    Essential hypertension    Major depressive disorder, recurrent episode, in full remission    Difficulty urinating    Benign prostatic hyperplasia with weak urinary stream    Spondylolisthesis at L5-S1 level    Abdominal hernia    Eczema    Mixed hyperlipidemia    Ulnar neuropathy at elbow of left upper extremity    Vitamin D deficiency    Insect bite of right ankle    Annual physical exam    Essential tremor    Gastroesophageal reflux disease without esophagitis    Skin pustule    Bilateral hip pain    Sensory disorder    Skin lesion of chest wall    Dizziness    Numbness and tingling in both hands    Dysfunction of right eustachian tube    Slow transit constipation        Past Surgical History:   Procedure Laterality Date    APPENDECTOMY      HAND SURGERY      HERNIA REPAIR      KNEE ACL RECONSTRUCTION Left 2000    KNEE MENISCECTOMY  2015    lateral    WISDOM TOOTH EXTRACTION         Social History     Socioeconomic History    Marital status: Single   Tobacco Use    Smoking status: Never     Passive exposure: Never    Smokeless tobacco: Never   Vaping Use    Vaping status: Never Used   Substance and Sexual Activity    Alcohol use: Never    Drug use: Never    Sexual activity: Defer       Family  "History   Problem Relation Age of Onset    Heart disease Paternal Grandmother     Heart disease Paternal Grandfather     Cancer Paternal Grandfather     Diabetes Other        Family history, surgical history, past medical history, Allergies and meds reviewed with patient today and updated in [facility] EMR.     ROS:  Review of Systems   Constitutional:  Negative for appetite change and unexpected weight loss.   Cardiovascular:  Negative for palpitations.   Neurological:  Positive for tremors.   Psychiatric/Behavioral:  Positive for decreased concentration.        OBJECTIVE:  Vitals:    06/24/25 0825   BP: 116/73   BP Location: Left arm   Patient Position: Sitting   Pulse: 78   Temp: 98 °F (36.7 °C)   SpO2: 96%   Weight: 112 kg (247 lb)   Height: 195.6 cm (77.01\")     No results found.   Body mass index is 29.28 kg/m².  No LMP for male patient.    The 10-year ASCVD risk score (Gopi HILLMAN, et al., 2019) is: 1.3%    Values used to calculate the score:      Age: 44 years      Sex: Male      Is Non- : No      Diabetic: No      Tobacco smoker: No      Systolic Blood Pressure: 116 mmHg      Is BP treated: Yes      HDL Cholesterol: 39 mg/dL      Total Cholesterol: 159 mg/dL     Physical Exam  Vitals and nursing note reviewed.   Constitutional:       General: He is not in acute distress.     Appearance: Normal appearance. He is normal weight.   HENT:      Head: Normocephalic.   Cardiovascular:      Rate and Rhythm: Normal rate and regular rhythm.      Heart sounds: Normal heart sounds. No murmur heard.  Pulmonary:      Effort: Pulmonary effort is normal.      Breath sounds: Normal breath sounds. No wheezing or rhonchi.   Neurological:      Mental Status: He is alert and oriented to person, place, and time.   Psychiatric:         Mood and Affect: Mood normal.         Thought Content: Thought content normal.         Judgment: Judgment normal.         Procedures    No visits with results within 30 Day(s) from " this visit.   Latest known visit with results is:   Lab on 05/08/2025   Component Date Value Ref Range Status    PSA 05/08/2025 0.558  0.000 - 4.000 ng/mL Final       ASSESSMENT/ PLAN:    Diagnoses and all orders for this visit:    1. Attention deficit hyperactivity disorder (ADHD), predominantly inattentive type (Primary)  Assessment & Plan:  He wishes to transition from his Vyvanse to a nonstimulant.  Will start with some Strattera and see how well he does.      Other orders  -     atomoxetine (Strattera) 80 MG capsule; Take 1 capsule by mouth Daily.  Dispense: 30 capsule; Refill: 1               Orders Placed Today:     New Medications Ordered This Visit   Medications    atomoxetine (Strattera) 80 MG capsule     Sig: Take 1 capsule by mouth Daily.     Dispense:  30 capsule     Refill:  1        Management Plan:     An After Visit Summary was printed and given to the patient at discharge.    Follow-up: Return in about 4 weeks (around 7/22/2025) for Recheck.    Ed Myers DO 6/24/2025 08:50 EDT  This note was electronically signed.

## 2025-06-24 NOTE — ASSESSMENT & PLAN NOTE
He wishes to transition from his Vyvanse to a nonstimulant.  Will start with some Strattera and see how well he does.

## 2025-07-03 RX ORDER — METOPROLOL SUCCINATE 100 MG/1
100 TABLET, EXTENDED RELEASE ORAL DAILY
Qty: 90 TABLET | Refills: 1 | Status: SHIPPED | OUTPATIENT
Start: 2025-07-03

## 2025-07-20 PROBLEM — R35.0 URINARY FREQUENCY: Status: ACTIVE | Noted: 2025-07-20

## 2025-07-20 PROBLEM — N41.1 CHRONIC PROSTATITIS: Status: ACTIVE | Noted: 2025-07-20

## 2025-07-20 PROBLEM — R39.12 WEAK URINARY STREAM: Status: ACTIVE | Noted: 2025-07-20

## 2025-07-21 ENCOUNTER — TELEPHONE (OUTPATIENT)
Dept: UROLOGY | Age: 45
End: 2025-07-21
Payer: COMMERCIAL

## 2025-07-21 NOTE — TELEPHONE ENCOUNTER
SPOKE TO PT AND MOVED HIS APPT WITH DR CARTER, PT IS AWARE THAT WE DO NOT HAVE ANYTHING SOONER AND THAT HE IS ON THE WAIT LIST.

## 2025-07-21 NOTE — TELEPHONE ENCOUNTER
----- Message from Saravanan CAROLINA sent at 7/21/2025  9:55 AM EDT -----  I don't have anything sooner unfortunately. Jaydon is out and had something come up tomorrow. I will watch for cancellations for him  ----- Message -----  From: Maycol Richey [employer] Rep  Sent: 7/21/2025   9:49 AM EDT  To: Saravanan Johnson RN    SPOKE TO PT AND HE WAS REQUESTING A SOONER APPT AS HE HAS BEEN WAITING A LONG TIME FOR THIS, PLEASE ADVISE?  ----- Message -----  From: Saravanan Johnson RN  Sent: 7/21/2025   9:30 AM EDT  To: Demetria Ross    Need to move pt out. Jaydon needs last pt at 2:30 tomorrow. Can he move to 8/12 at 12 please

## 2025-07-22 ENCOUNTER — OFFICE VISIT (OUTPATIENT)
Dept: FAMILY MEDICINE CLINIC | Facility: CLINIC | Age: 45
End: 2025-07-22
Payer: COMMERCIAL

## 2025-07-22 VITALS
TEMPERATURE: 97.2 F | WEIGHT: 243 LBS | BODY MASS INDEX: 28.69 KG/M2 | OXYGEN SATURATION: 92 % | HEIGHT: 77 IN | SYSTOLIC BLOOD PRESSURE: 112 MMHG | HEART RATE: 82 BPM | DIASTOLIC BLOOD PRESSURE: 84 MMHG

## 2025-07-22 DIAGNOSIS — F33.42 MAJOR DEPRESSIVE DISORDER, RECURRENT EPISODE, IN FULL REMISSION: ICD-10-CM

## 2025-07-22 DIAGNOSIS — F90.0 ATTENTION DEFICIT HYPERACTIVITY DISORDER (ADHD), PREDOMINANTLY INATTENTIVE TYPE: Primary | ICD-10-CM

## 2025-07-22 PROCEDURE — 99213 OFFICE O/P EST LOW 20 MIN: CPT | Performed by: FAMILY MEDICINE

## 2025-07-22 RX ORDER — ATOMOXETINE 80 MG/1
80 CAPSULE ORAL DAILY
Qty: 90 CAPSULE | Refills: 1 | Status: SHIPPED | OUTPATIENT
Start: 2025-07-22

## 2025-07-22 RX ORDER — FLUTICASONE PROPIONATE 50 MCG
2 SPRAY, SUSPENSION (ML) NASAL DAILY
Qty: 48 G | Refills: 3 | Status: SHIPPED | OUTPATIENT
Start: 2025-07-22

## 2025-07-22 RX ORDER — SERTRALINE HYDROCHLORIDE 100 MG/1
50 TABLET, FILM COATED ORAL DAILY
Qty: 90 TABLET | Refills: 1 | Status: SHIPPED | OUTPATIENT
Start: 2025-07-22

## 2025-07-22 NOTE — PROGRESS NOTES
Chief Complaint   Patient presents with    Follow-up     4 week ADHD        Subjective     Shiva Varela  has a past medical history of Autism spectrum disorder, Callus, Essential hypertension (04/30/2019), and Ulnar neuropathy at elbow of left upper extremity (09/10/2020).    ADD-he feels that he is doing well with the Strattera.  He feels that his attention focus and concentration is good.  He denies any feelings of shaky nervous anxious or jittery.        PHQ-2 Depression Screening  Little interest or pleasure in doing things?     Feeling down, depressed, or hopeless?     PHQ-2 Total Score     PHQ-9 Depression Screening  Little interest or pleasure in doing things?     Feeling down, depressed, or hopeless?     Trouble falling or staying asleep, or sleeping too much?     Feeling tired or having little energy?     Poor appetite or overeating?     Feeling bad about yourself - or that you are a failure or have let yourself or your family down?     Trouble concentrating on things, such as reading the newspaper or watching television?     Moving or speaking so slowly that other people could have noticed? Or the opposite - being so fidgety or restless that you have been moving around a lot more than usual?     Thoughts that you would be better off dead, or of hurting yourself in some way?     PHQ-9 Total Score     If you checked off any problems, how difficult have these problems made it for you to do your work, take care of things at home, or get along with other people?       Allergies   Allergen Reactions    Bee Venom Unknown - Low Severity    Codeine Unknown - Low Severity    Latex Unknown - Low Severity     Persistent Contact        Prior to Admission medications    Medication Sig Start Date End Date Taking? Authorizing Provider   atomoxetine (Strattera) 80 MG capsule Take 1 capsule by mouth Daily. 6/24/25  Yes Ed Myers, DO   fluticasone (FLONASE) 50 MCG/ACT nasal spray Administer 2 sprays into the  nostril(s) as directed by provider Daily. 3/31/25  Yes Ed Myers DO   Lactobacillus (Florajen Acidophilus) capsule Take 1 capsule by mouth Daily. 3/28/25  Yes Mery Ramirez APRN   metoprolol succinate XL (TOPROL-XL) 100 MG 24 hr tablet Take 1 tablet by mouth daily. 7/3/25  Yes Ed Myers DO   primidone (MYSOLINE) 50 MG tablet Take 2 tablets daily 6/17/25  Yes Jeremy Warner MD   sertraline (ZOLOFT) 100 MG tablet Take 1 tablet by mouth Daily. 1/21/25  Yes Ed Myers DO        Patient Active Problem List   Diagnosis    Attention deficit hyperactivity disorder (ADHD)    Essential hypertension    Major depressive disorder, recurrent episode, in full remission    Difficulty urinating    Benign prostatic hyperplasia with weak urinary stream    Spondylolisthesis at L5-S1 level    Abdominal hernia    Eczema    Mixed hyperlipidemia    Ulnar neuropathy at elbow of left upper extremity    Vitamin D deficiency    Insect bite of right ankle    Annual physical exam    Essential tremor    Gastroesophageal reflux disease without esophagitis    Skin pustule    Bilateral hip pain    Sensory disorder    Skin lesion of chest wall    Dizziness    Numbness and tingling in both hands    Dysfunction of right eustachian tube    Slow transit constipation    Chronic prostatitis    Weak urinary stream    Urinary frequency        Past Surgical History:   Procedure Laterality Date    APPENDECTOMY      HAND SURGERY      HERNIA REPAIR      KNEE ACL RECONSTRUCTION Left 2000    KNEE MENISCECTOMY  2015    lateral    WISDOM TOOTH EXTRACTION         Social History     Socioeconomic History    Marital status: Single   Tobacco Use    Smoking status: Never     Passive exposure: Never    Smokeless tobacco: Never   Vaping Use    Vaping status: Never Used   Substance and Sexual Activity    Alcohol use: Never    Drug use: Never    Sexual activity: Defer       Family History   Problem Relation Age  "of Onset    Heart disease Paternal Grandmother     Heart disease Paternal Grandfather     Cancer Paternal Grandfather     Diabetes Other        Family history, surgical history, past medical history, Allergies and meds reviewed with patient today and updated in [employer] EMR.     ROS:  Review of Systems   Constitutional:  Negative for fatigue.   Cardiovascular:  Negative for palpitations.   Neurological:  Negative for tremors and headache.   Psychiatric/Behavioral:  Negative for decreased concentration.        OBJECTIVE:  Vitals:    07/22/25 0852   BP: 112/84   BP Location: Left arm   Patient Position: Sitting   Pulse: 82   Temp: 97.2 °F (36.2 °C)   SpO2: 92%   Weight: 110 kg (243 lb)   Height: 195.6 cm (77.01\")     No results found.   Body mass index is 28.81 kg/m².  No LMP for male patient.    The 10-year ASCVD risk score (Gopi HILLMAN, et al., 2019) is: 1.2%    Values used to calculate the score:      Age: 44 years      Sex: Male      Is Non- : No      Diabetic: No      Tobacco smoker: No      Systolic Blood Pressure: 112 mmHg      Is BP treated: Yes      HDL Cholesterol: 39 mg/dL      Total Cholesterol: 159 mg/dL     Physical Exam  Vitals and nursing note reviewed.   Constitutional:       General: He is not in acute distress.     Appearance: Normal appearance. He is normal weight.   Cardiovascular:      Rate and Rhythm: Normal rate and regular rhythm.      Heart sounds: Normal heart sounds. No murmur heard.  Pulmonary:      Effort: Pulmonary effort is normal.      Breath sounds: Normal breath sounds. No wheezing or rhonchi.   Neurological:      Mental Status: He is alert and oriented to person, place, and time.   Psychiatric:         Mood and Affect: Mood normal.         Thought Content: Thought content normal.         Judgment: Judgment normal.         Procedures    No visits with results within 30 Day(s) from this visit.   Latest known visit with results is:   Lab on 05/08/2025   Component Date " Value Ref Range Status    PSA 05/08/2025 0.558  0.000 - 4.000 ng/mL Final       ASSESSMENT/ PLAN:    Diagnoses and all orders for this visit:    1. Attention deficit hyperactivity disorder (ADHD), predominantly inattentive type (Primary)  Assessment & Plan:  He feels that he is doing well with the Strattera at this time.  Will continue that at his current dosage without change      2. Major depressive disorder, recurrent episode, in full remission  Assessment & Plan:  He is doing well at this time.  Will try reducing back his sertraline to a half a tab daily.      Other orders  -     sertraline (ZOLOFT) 100 MG tablet; Take 0.5 tablets by mouth Daily.  Dispense: 90 tablet; Refill: 1  -     atomoxetine (Strattera) 80 MG capsule; Take 1 capsule by mouth Daily.  Dispense: 90 capsule; Refill: 1  -     fluticasone (FLONASE) 50 MCG/ACT nasal spray; Administer 2 sprays into the nostril(s) as directed by provider Daily.  Dispense: 48 g; Refill: 3               Orders Placed Today:     New Medications Ordered This Visit   Medications    sertraline (ZOLOFT) 100 MG tablet     Sig: Take 0.5 tablets by mouth Daily.     Dispense:  90 tablet     Refill:  1    atomoxetine (Strattera) 80 MG capsule     Sig: Take 1 capsule by mouth Daily.     Dispense:  90 capsule     Refill:  1    fluticasone (FLONASE) 50 MCG/ACT nasal spray     Sig: Administer 2 sprays into the nostril(s) as directed by provider Daily.     Dispense:  48 g     Refill:  3        Management Plan:     An After Visit Summary was printed and given to the patient at discharge.    Follow-up: Return in about 3 months (around 10/22/2025) for Recheck.    Ed Myers,  7/22/2025 09:15 EDT  This note was electronically signed.

## 2025-07-22 NOTE — ASSESSMENT & PLAN NOTE
He feels that he is doing well with the Strattera at this time.  Will continue that at his current dosage without change

## 2025-08-08 DIAGNOSIS — I10 ESSENTIAL HYPERTENSION: Primary | ICD-10-CM

## 2025-08-08 DIAGNOSIS — E78.2 MIXED HYPERLIPIDEMIA: ICD-10-CM

## 2025-08-12 ENCOUNTER — PROCEDURE VISIT (OUTPATIENT)
Dept: UROLOGY | Age: 45
End: 2025-08-12
Payer: COMMERCIAL

## 2025-08-12 DIAGNOSIS — R39.12 WEAK URINARY STREAM: ICD-10-CM

## 2025-08-12 DIAGNOSIS — R39.12 BENIGN PROSTATIC HYPERPLASIA WITH WEAK URINARY STREAM: Primary | ICD-10-CM

## 2025-08-12 DIAGNOSIS — N41.9 PROSTATITIS, UNSPECIFIED PROSTATITIS TYPE: ICD-10-CM

## 2025-08-12 DIAGNOSIS — N41.1 CHRONIC PROSTATITIS: ICD-10-CM

## 2025-08-12 DIAGNOSIS — N40.1 BENIGN PROSTATIC HYPERPLASIA WITH WEAK URINARY STREAM: Primary | ICD-10-CM

## 2025-08-12 RX ORDER — CIPROFLOXACIN 500 MG/1
500 TABLET, FILM COATED ORAL 2 TIMES DAILY
Qty: 56 TABLET | Refills: 0 | Status: SHIPPED | OUTPATIENT
Start: 2025-08-12 | End: 2025-09-09

## 2025-08-12 RX ORDER — LACTOBACILLUS ACIDOPHILUS 20B CELL
1 CAPSULE ORAL DAILY
Qty: 28 CAPSULE | Refills: 0 | Status: SHIPPED | OUTPATIENT
Start: 2025-08-12

## 2025-08-21 RX ORDER — SERTRALINE HYDROCHLORIDE 100 MG/1
100 TABLET, FILM COATED ORAL DAILY
Qty: 90 TABLET | Refills: 0 | Status: SHIPPED | OUTPATIENT
Start: 2025-08-21

## 2025-08-26 ENCOUNTER — LAB (OUTPATIENT)
Dept: LAB | Facility: HOSPITAL | Age: 45
End: 2025-08-26
Payer: COMMERCIAL

## 2025-08-26 LAB
ALBUMIN SERPL-MCNC: 4.3 G/DL (ref 3.5–5.2)
ALBUMIN/GLOB SERPL: 1.4 G/DL
ALP SERPL-CCNC: 54 U/L (ref 39–117)
ALT SERPL W P-5'-P-CCNC: 12 U/L (ref 1–41)
ANION GAP SERPL CALCULATED.3IONS-SCNC: 9.4 MMOL/L (ref 5–15)
AST SERPL-CCNC: 17 U/L (ref 1–40)
BILIRUB SERPL-MCNC: 0.5 MG/DL (ref 0–1.2)
BUN SERPL-MCNC: 15 MG/DL (ref 6–20)
BUN/CREAT SERPL: 14.2 (ref 7–25)
CALCIUM SPEC-SCNC: 9.6 MG/DL (ref 8.6–10.5)
CHLORIDE SERPL-SCNC: 104 MMOL/L (ref 98–107)
CHOLEST SERPL-MCNC: 185 MG/DL (ref 0–200)
CO2 SERPL-SCNC: 27.6 MMOL/L (ref 22–29)
CREAT SERPL-MCNC: 1.06 MG/DL (ref 0.76–1.27)
DEPRECATED RDW RBC AUTO: 39.7 FL (ref 37–54)
EGFRCR SERPLBLD CKD-EPI 2021: 89.9 ML/MIN/1.73
ERYTHROCYTE [DISTWIDTH] IN BLOOD BY AUTOMATED COUNT: 12 % (ref 12.3–15.4)
GLOBULIN UR ELPH-MCNC: 3.1 GM/DL
GLUCOSE SERPL-MCNC: 90 MG/DL (ref 65–99)
HCT VFR BLD AUTO: 46.3 % (ref 37.5–51)
HDLC SERPL-MCNC: 45 MG/DL (ref 40–60)
HGB BLD-MCNC: 15.1 G/DL (ref 13–17.7)
LDLC SERPL CALC-MCNC: 122 MG/DL (ref 0–100)
LDLC/HDLC SERPL: 2.66 {RATIO}
MCH RBC QN AUTO: 29.9 PG (ref 26.6–33)
MCHC RBC AUTO-ENTMCNC: 32.6 G/DL (ref 31.5–35.7)
MCV RBC AUTO: 91.7 FL (ref 79–97)
PLATELET # BLD AUTO: 234 10*3/MM3 (ref 140–450)
PMV BLD AUTO: 9.8 FL (ref 6–12)
POTASSIUM SERPL-SCNC: 4.2 MMOL/L (ref 3.5–5.2)
PROT SERPL-MCNC: 7.4 G/DL (ref 6–8.5)
RBC # BLD AUTO: 5.05 10*6/MM3 (ref 4.14–5.8)
SODIUM SERPL-SCNC: 141 MMOL/L (ref 136–145)
TRIGL SERPL-MCNC: 101 MG/DL (ref 0–150)
TSH SERPL DL<=0.05 MIU/L-ACNC: 0.94 UIU/ML (ref 0.27–4.2)
VLDLC SERPL-MCNC: 18 MG/DL (ref 5–40)
WBC NRBC COR # BLD AUTO: 6.78 10*3/MM3 (ref 3.4–10.8)

## 2025-08-26 PROCEDURE — 80050 GENERAL HEALTH PANEL: CPT | Performed by: FAMILY MEDICINE

## 2025-08-26 PROCEDURE — 80061 LIPID PANEL: CPT | Performed by: FAMILY MEDICINE
